# Patient Record
Sex: FEMALE | Race: WHITE | NOT HISPANIC OR LATINO | Employment: OTHER | URBAN - NONMETROPOLITAN AREA
[De-identification: names, ages, dates, MRNs, and addresses within clinical notes are randomized per-mention and may not be internally consistent; named-entity substitution may affect disease eponyms.]

---

## 2017-08-09 ENCOUNTER — OFFICE VISIT (OUTPATIENT)
Dept: FAMILY MEDICINE CLINIC | Facility: CLINIC | Age: 42
End: 2017-08-09

## 2017-08-09 VITALS — SYSTOLIC BLOOD PRESSURE: 120 MMHG | DIASTOLIC BLOOD PRESSURE: 80 MMHG

## 2017-08-09 DIAGNOSIS — F33.1 MODERATE EPISODE OF RECURRENT MAJOR DEPRESSIVE DISORDER (HCC): ICD-10-CM

## 2017-08-09 DIAGNOSIS — Q78.0 OSTEOGENESIS IMPERFECTA TYPE III: Primary | ICD-10-CM

## 2017-08-09 DIAGNOSIS — S72.91XA CLOSED FRACTURE OF RIGHT FEMUR, UNSPECIFIED FRACTURE MORPHOLOGY, UNSPECIFIED PORTION OF FEMUR, INITIAL ENCOUNTER (HCC): ICD-10-CM

## 2017-08-09 PROCEDURE — 99203 OFFICE O/P NEW LOW 30 MIN: CPT | Performed by: STUDENT IN AN ORGANIZED HEALTH CARE EDUCATION/TRAINING PROGRAM

## 2017-08-09 NOTE — PROGRESS NOTES
"Subjective:     Maria Guadalupe Gibson is a 42 y.o. female who presents to [facility].     Osteogenesis Imperfecta   She has a history of osteogenesis imperfecta type III. She fell in June and had an x-ray done with showed a non-union of upper right femur and was recommended emergency surgery. Dr. Pham looked at x-ray and said it was too far for his capability. She needs another orthopedic surgeon. The pain is 5/10. Movement makes it worse. At baseline, she does not put any weight on it.     In the past, she has broken her right femur in which she had a josé miguel placed. She outgrew the josé miguel in childhood and had the josé miguel replaced. The replaced josé miguel she broke in half, and replaced it with a 10mm josé miguel, but it never healed. There has been no bone growth.     Depression  Over the past year, she has had episodes of depression. She had times where she didn't leave her house. She lives with her parents, and have had an overwhelming year with her health. She is sleeping alright. She doesn't eat as much as normal. She is agitated more than normal. Her concentration is decreased. She has had thoughts of hurting herself. She doesn't have a plan. The last time she had one of those thoughts two days ago. She has tried Prozac 15 years ago.     Preventative:  Over the past 2 weeks, have you felt down, depressed, or hopeless?Yes   Over the past 2 weeks, have you felt little interest or pleasure in doing things?Yes  Clinical depression screening refused by patient.No     On osteoporosis therapy?No     PHQ 9: 17 and states \"I feel lost, scared and alone\".     Past Medical Hx:  Past Medical History:   Diagnosis Date   • Depression    • Osteogenesis imperfecta type III        Past Surgical Hx:  Past Surgical History:   Procedure Laterality Date   • ELBOW PROCEDURE Right    • FEMUR SURGERY Bilateral    • HERNIA REPAIR     • SPINE SURGERY Bilateral        Health Maintenance:  Health Maintenance   Topic Date Due   • TDAP/TD VACCINES (1 - Tdap) " 01/09/1995   • MEDICARE ANNUAL WELLNESS  08/09/2017   • PAP SMEAR  08/09/2017   • INFLUENZA VACCINE  09/01/2017       Current Meds:  No current outpatient prescriptions on file.    Allergies:  Codeine and Latex    Family Hx:  Family History   Problem Relation Age of Onset   • Lymphoma Brother         Social History:  Social History     Social History   • Marital status: Single     Spouse name: N/A   • Number of children: N/A   • Years of education: N/A     Occupational History   • Not on file.     Social History Main Topics   • Smoking status: Current Every Day Smoker     Packs/day: 0.25   • Smokeless tobacco: Never Used   • Alcohol use No      Comment: sober for 2.5 years    • Drug use: No   • Sexual activity: Defer     Other Topics Concern   • Not on file     Social History Narrative   • No narrative on file       Review of Systems  Review of Systems   Constitutional: Positive for diaphoresis. Negative for chills and fever.   HENT: Negative for sneezing and sore throat.    Eyes: Negative for pain and discharge.   Respiratory: Negative for cough and shortness of breath.    Gastrointestinal: Negative for constipation, diarrhea, nausea and vomiting.   Endocrine: Negative for cold intolerance and heat intolerance.   Genitourinary: Negative for difficulty urinating, dysuria, frequency and urgency.   Musculoskeletal: Positive for arthralgias. Negative for myalgias.   Skin: Negative for rash and wound.   Allergic/Immunologic: Negative for environmental allergies and food allergies.   Neurological: Negative for dizziness, syncope and weakness.   Psychiatric/Behavioral: Positive for agitation, decreased concentration and sleep disturbance. Negative for confusion.       Objective:     /80   Physical Exam   Constitutional: She is oriented to person, place, and time. She appears well-developed and well-nourished. No distress.   HENT:   Head: Normocephalic and atraumatic.   Nose: Nose normal.   Eyes: Conjunctivae and EOM  are normal. Pupils are equal, round, and reactive to light.   Neck: Normal range of motion. Neck supple.   Cardiovascular: Normal rate, regular rhythm and normal heart sounds.    No murmur heard.  Pulmonary/Chest: Effort normal and breath sounds normal. No respiratory distress. She has no wheezes. She has no rales.   Abdominal: Soft. Bowel sounds are normal. She exhibits no distension. There is no tenderness. There is no guarding.   Musculoskeletal:   Patient has osteogenesis imperfecta type 3, so ROM is limited secondary to disease. Patient does not walk at baseline.    Neurological: She is alert and oriented to person, place, and time.   Skin: Skin is warm and dry.   Psychiatric: She has a normal mood and affect. Her behavior is normal.   Vitals reviewed.    Assessment/Plan:     1. Osteogenesis imperfecta type III    2. Closed fracture of right femur, unspecified fracture morphology, unspecified portion of femur, initial encounter    3. Moderate episode of recurrent major depressive disorder         Problems Addressed this Visit        Musculoskeletal and Integument    Osteogenesis imperfecta type III - Primary    Relevant Orders    Ambulatory Referral to Orthopedic Surgery       Other    Moderate episode of recurrent major depressive disorder    Relevant Orders    -Ambulatory Referral to Psychology.   -Patient states that she wants to look up antidepressant medications on her own before she makes a decision. She is going to go home, and make an appointment in a week to start a new medication after she does her research.       Other Visit Diagnoses     Closed fracture of right femur, unspecified fracture morphology, unspecified portion of femur, initial encounter        Relevant Orders    Ambulatory Referral to Orthopedic Surgery        Follow-up:     Return in about 1 week (around 8/16/2017) for Recheck .    GOALS:  To discuss with psychologist about depression and to make a decision regarding medication.      Preventative:  Preventative Medicine: Female Preventative: Last PAP was 8/1/2015.  Vaccines:   Tetanus vaccine: up to date  Annual influenza vaccine: up to date   Pneumococcal vaccine: up to date  Hep B vaccine: up to date   Zoster vaccine:up to date    Smoking: Smoking cessation counseling was provided.  Alcohol: does not drink  Weight: eat more fruits and vegetables    RISK SCORE: 4            This document has been electronically signed by Brinda Browne MD on August 9, 2017 4:42 PM

## 2017-08-09 NOTE — PROGRESS NOTES
Subjective   Maria Guadalupe Gibson is a 42 y.o. female.     History of Present Illness The patient comes in to establish with Dr. Browne.    The following portions of the patient's history were reviewed and updated as appropriate: allergies, current medications, past family history, past medical history, past social history, past surgical history and problem list.    Review of Systems   Constitutional: Negative for fatigue and fever.   Respiratory: Negative for cough, chest tightness and stridor.    Cardiovascular: Negative for chest pain, palpitations and leg swelling.   Musculoskeletal: Positive for arthralgias and myalgias.       Objective   Physical Exam   Constitutional: She appears well-developed and well-nourished.   HENT:   Head: Normocephalic and atraumatic.   Right Ear: External ear normal.   Left Ear: External ear normal.   Nose: Nose normal.   Mouth/Throat: Oropharynx is clear and moist.   Eyes: EOM are normal. Pupils are equal, round, and reactive to light.   Neck: Normal range of motion.   Cardiovascular: Normal rate, regular rhythm and normal heart sounds.  Exam reveals no gallop and no friction rub.    No murmur heard.  Pulmonary/Chest: Effort normal and breath sounds normal. No respiratory distress. She has no wheezes. She has no rales.   Abdominal: Soft. Bowel sounds are normal. She exhibits no distension. There is no tenderness.   Musculoskeletal:   Minimally tender right hip.   Vitals reviewed.      Assessment/Plan   Maria Guadalupe was seen today for establish care.    Diagnoses and all orders for this visit:    Osteogenesis imperfecta type III    Closed fracture of right femur, unspecified fracture morphology, unspecified portion of femur, initial encounter    Other orders  -     Ambulatory Referral to Orthopedic Surgery        I have reviewed the notes, assessments, and/or procedures performed by the resident  , I concur with her/his documentation of Maria Guadalupe Gibson.

## 2017-08-22 ENCOUNTER — OFFICE VISIT (OUTPATIENT)
Dept: FAMILY MEDICINE CLINIC | Facility: CLINIC | Age: 42
End: 2017-08-22

## 2017-08-22 VITALS — SYSTOLIC BLOOD PRESSURE: 118 MMHG | HEART RATE: 89 BPM | DIASTOLIC BLOOD PRESSURE: 78 MMHG | OXYGEN SATURATION: 99 %

## 2017-08-22 DIAGNOSIS — F33.1 MODERATE EPISODE OF RECURRENT MAJOR DEPRESSIVE DISORDER (HCC): Primary | ICD-10-CM

## 2017-08-22 PROCEDURE — 99213 OFFICE O/P EST LOW 20 MIN: CPT | Performed by: FAMILY MEDICINE

## 2017-08-22 RX ORDER — ESCITALOPRAM OXALATE 10 MG/1
10 TABLET ORAL DAILY
Qty: 30 TABLET | Refills: 0 | Status: SHIPPED | OUTPATIENT
Start: 2017-08-22 | End: 2017-09-26 | Stop reason: SDUPTHER

## 2017-08-22 NOTE — PROGRESS NOTES
Subjective:     Maria Guadalupe Gibson is a 42 y.o. female patient of Dr. Browne, who presents for follow up of depression.     Depression HPI:  She has had depression off and on several times over the past year.  Episodes have been associated with agoraphobia.  She has decreased appetite, agitation, insomnia, decreased concentration.  Denies suicidal ideation at this time but does report feeling that way in the past.  She lives with her parents, and have had an overwhelming year with her health.  Previous treatments tried: Prozac 15 years ago.   She recently saw her PCP to discuss starting depression medications but wanted to go home to research side effects.  Today she is asking if starting Lexapro could be an option for her.    Preventative:  Over the past 2 weeks, have you felt down, depressed, or hopeless?Yes   Over the past 2 weeks, have you felt little interest or pleasure in doing things?Yes  Clinical depression screening refused by patient.No     On osteoporosis therapy?No     Past Medical Hx:  Past Medical History:   Diagnosis Date   • Agoraphobia with panic attacks    • Benign paroxysmal positional vertigo    • Chronic pain    • Depression    • Depressive disorder    • External hemorrhoids without complication     excised   • Internal hemorrhoids     with prolapse      • Osteogenesis imperfecta type III     with dwarfism, non union R femur,Uses a wheelchair          Past Surgical Hx:  Past Surgical History:   Procedure Laterality Date   • COLONOSCOPY  11/15/2010    Colitis found in the entire colon. Biopsy taken. Normal terminal ileum. Biopsy taken   • ELBOW PROCEDURE Right    • FEMUR SURGERY Bilateral    • HEMORROIDECTOMY  09/17/2013    With excision of a single internal and external hemorrhoid.   • HERNIA REPAIR  09/21/2011    Large ventral hernia. Open ventral hernioplasty with mesh   • LUMBAR DISC SURGERY     • SPINE SURGERY Bilateral    • UPPER GASTROINTESTINAL ENDOSCOPY  11/15/2010    Normal  esophagus.Gastritis found in the stomach. Biopsy taken. Normal duodenum. Biopsy taken       Health Maintenance:  Health Maintenance   Topic Date Due   • PNEUMOCOCCAL VACCINE (19-64 MEDIUM RISK) (1 of 1 - PPSV23) 01/09/1995   • TDAP/TD VACCINES (1 - Tdap) 01/09/1995   • MEDICARE ANNUAL WELLNESS  08/09/2017   • PAP SMEAR  08/09/2017   • INFLUENZA VACCINE  09/01/2017       Current Meds:  No current outpatient prescriptions on file.    Allergies:  Latex and Codeine    Family Hx:  Family History   Problem Relation Age of Onset   • Lymphoma Brother         Social History:  Social History     Social History   • Marital status: Single     Spouse name: N/A   • Number of children: N/A   • Years of education: N/A     Occupational History   • Not on file.     Social History Main Topics   • Smoking status: Current Every Day Smoker     Packs/day: 0.25   • Smokeless tobacco: Never Used   • Alcohol use No      Comment: sober for 2.5 years    • Drug use: No   • Sexual activity: Defer     Other Topics Concern   • Not on file     Social History Narrative       Review of Systems  Review of Systems   Constitutional: Negative for chills and fever.   HENT: Negative for congestion, sneezing and sore throat.    Eyes: Negative for photophobia and visual disturbance.   Respiratory: Negative for cough and shortness of breath.    Cardiovascular: Negative for chest pain and palpitations.   Gastrointestinal: Negative for constipation, diarrhea, nausea and vomiting.   Endocrine: Negative for cold intolerance and heat intolerance.   Genitourinary: Negative for difficulty urinating, dysuria, frequency and urgency.   Musculoskeletal: Positive for arthralgias. Negative for myalgias.   Skin: Negative for rash and wound.   Allergic/Immunologic: Negative for environmental allergies and food allergies.   Neurological: Negative for dizziness, syncope and weakness.   Psychiatric/Behavioral: Positive for agitation, decreased concentration, dysphoric mood and  sleep disturbance. Negative for confusion and suicidal ideas.       Objective:     /78  Pulse 89  SpO2 99%   Physical Exam   Constitutional: She is oriented to person, place, and time. She appears well-developed and well-nourished. No distress.   HENT:   Head: Normocephalic and atraumatic.   Eyes: EOM are normal. Pupils are equal, round, and reactive to light.   Neck: Normal range of motion.   Cardiovascular: Normal rate, regular rhythm and normal heart sounds.    No murmur heard.  Pulmonary/Chest: Effort normal and breath sounds normal. No respiratory distress. She has no wheezes. She has no rales.   Abdominal: Soft. Bowel sounds are normal. She exhibits no distension. There is no tenderness. There is no guarding.   Musculoskeletal:   Patient has osteogenesis imperfecta type 3, so ROM is limited secondary to disease. Patient does not walk at baseline.    Neurological: She is alert and oriented to person, place, and time.   Skin: Skin is warm and dry.   Multiple tattoos   Psychiatric: She has a normal mood and affect. Her behavior is normal.   Vitals reviewed.    Assessment/Plan:     Maria Guadalupe was seen today for depression and follow-up.    Diagnoses and all orders for this visit:    Moderate episode of recurrent major depressive disorder  -     escitalopram (LEXAPRO) 10 MG tablet; Take 1 tablet by mouth Daily.      Follow-up:     Return in about 1 month (around 9/22/2017) for with DR LYNCH.    GOALS:  To discuss with psychologist about depression and to make a decision regarding medication.     Preventative:  Preventative Medicine: Female Preventative: Last PAP was 8/1/2015.  Vaccines:   Tetanus vaccine: up to date  Annual influenza vaccine: up to date   Pneumococcal vaccine: up to date  Hep B vaccine: up to date   Zoster vaccine:up to date    Smoking: Smoking cessation counseling was provided.  Alcohol: does not drink  Weight: eat more fruits and vegetables    RISK SCORE: 4      Signature  Kimmie  MD Maggie PGY3  [place] [address]  Office: [phone]    This document has been electronically signed by Kimmie Serrano MD on August 25, 2017 2:15 PM

## 2017-09-25 ENCOUNTER — OFFICE VISIT (OUTPATIENT)
Dept: BEHAVIORAL HEALTH | Facility: CLINIC | Age: 42
End: 2017-09-25

## 2017-09-25 ENCOUNTER — TELEPHONE (OUTPATIENT)
Dept: FAMILY MEDICINE CLINIC | Facility: CLINIC | Age: 42
End: 2017-09-25

## 2017-09-25 DIAGNOSIS — F33.1 MODERATE EPISODE OF RECURRENT MAJOR DEPRESSIVE DISORDER (HCC): Primary | ICD-10-CM

## 2017-09-25 DIAGNOSIS — F33.1 MODERATE EPISODE OF RECURRENT MAJOR DEPRESSIVE DISORDER (HCC): ICD-10-CM

## 2017-09-25 PROCEDURE — 90791 PSYCH DIAGNOSTIC EVALUATION: CPT | Performed by: PSYCHOLOGIST
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
## 2017-09-26 RX ORDER — ESCITALOPRAM OXALATE 10 MG/1
10 TABLET ORAL DAILY
Qty: 30 TABLET | Refills: 2 | Status: SHIPPED | OUTPATIENT
Start: 2017-09-26 | End: 2018-02-06

## 2017-09-26 NOTE — TELEPHONE ENCOUNTER
Refilled. Patient needs to be seen in November so we can assess her progress.        This document has been electronically signed by Brinda Browne MD on September 26, 2017 10:33 AM

## 2018-02-06 ENCOUNTER — OFFICE VISIT (OUTPATIENT)
Dept: FAMILY MEDICINE CLINIC | Facility: CLINIC | Age: 43
End: 2018-02-06

## 2018-02-06 VITALS
DIASTOLIC BLOOD PRESSURE: 70 MMHG | HEIGHT: 55 IN | HEART RATE: 103 BPM | OXYGEN SATURATION: 95 % | WEIGHT: 98 LBS | BODY MASS INDEX: 22.68 KG/M2 | SYSTOLIC BLOOD PRESSURE: 120 MMHG

## 2018-02-06 DIAGNOSIS — R63.5 WEIGHT GAIN: Primary | ICD-10-CM

## 2018-02-06 DIAGNOSIS — R79.89 OTHER SPECIFIED ABNORMAL FINDINGS OF BLOOD CHEMISTRY: ICD-10-CM

## 2018-02-06 DIAGNOSIS — Z13.1 SCREENING FOR DIABETES MELLITUS: ICD-10-CM

## 2018-02-06 DIAGNOSIS — E66.1 DRUG-INDUCED OBESITY, UNSPECIFIED CLASSIFICATION, UNSPECIFIED WHETHER SERIOUS COMORBIDITY PRESENT: ICD-10-CM

## 2018-02-06 PROCEDURE — 99406 BEHAV CHNG SMOKING 3-10 MIN: CPT | Performed by: STUDENT IN AN ORGANIZED HEALTH CARE EDUCATION/TRAINING PROGRAM

## 2018-02-06 PROCEDURE — 99213 OFFICE O/P EST LOW 20 MIN: CPT | Performed by: STUDENT IN AN ORGANIZED HEALTH CARE EDUCATION/TRAINING PROGRAM

## 2018-02-06 RX ORDER — BUPROPION HYDROCHLORIDE 150 MG/1
150 TABLET ORAL DAILY
Qty: 60 TABLET | Refills: 1 | Status: SHIPPED | OUTPATIENT
Start: 2018-02-06 | End: 2018-03-16 | Stop reason: SDUPTHER

## 2018-02-06 NOTE — PROGRESS NOTES
Subjective   Maria Guadalupe Gibson is a 43 y.o. female.     History of Present Illness   The patient comes in for evaluation by Dr. Browne.She has had weight gain associated with her antidepressant. She has stopped it.  The following portions of the patient's history were reviewed and updated as appropriate: allergies, current medications, past family history, past medical history, past social history, past surgical history and problem list.    Review of Systems   Constitutional: Negative for fatigue and fever.   Respiratory: Negative for cough, chest tightness and stridor.    Cardiovascular: Negative for chest pain, palpitations and leg swelling.       Objective   Physical Exam   Constitutional: She appears well-developed.   HENT:   Head: Normocephalic.   Right Ear: External ear normal.   Left Ear: External ear normal.   Nose: Nose normal.   Mouth/Throat: Oropharynx is clear and moist.   Eyes: Pupils are equal, round, and reactive to light.   Neck: Normal range of motion.   Cardiovascular: Normal rate, regular rhythm and normal heart sounds.  Exam reveals no friction rub.    No murmur heard.  Pulmonary/Chest: Effort normal and breath sounds normal. No respiratory distress. She has no wheezes. She has no rales.   Abdominal: Soft. Bowel sounds are normal. She exhibits no distension. There is no tenderness.   Skin:   Multiple tattoos on the arms and trunk.       Assessment/Plan   Maria Guadalupe was seen today for depression.    Diagnoses and all orders for this visit:    Weight gain  -     TSH; Future  -     T4, free; Future  -     CBC No Differential; Future  -     Comprehensive metabolic panel; Future    Screening for diabetes mellitus  -     Hemoglobin A1c; Future  -     Lipid panel; Future    Other specified abnormal findings of blood chemistry   -     Hemoglobin A1c; Future    Drug-induced obesity, unspecified classification, unspecified whether serious comorbidity present   -     Lipid panel; Future    Other orders  -      buPROPion XL (WELLBUTRIN XL) 150 MG 24 hr tablet; Take 1 tablet by mouth Daily. Take one tablet by mouth for 4 days then increase to 2 pills a day.      Agree with plans as per Dr. Browne.

## 2018-02-06 NOTE — PROGRESS NOTES
Subjective:     Maria Guadalupe Gibson is a 43 y.o. female who presents for follow up for depression.     Depression  She established care in August of 2017 and noted severe depression. She did not want to start an anti-depressant at that time because she wanted to look up the medications herself. She saw Dr. Serrano a few weeks later and started Lexapro 10mg. She has been taking it since 3 days ago and she abruptly stopped. She has gained 30 pounds. She says she stopped taking the medication due to weight gain. She states that it has worked for her otherwise. She has a history of depression with suicidal and homocidal ideation at times, but that has significantly improved. Most of her mental illness is associated with the fact that she lives with her parents in an unhealthy situation.   She saw Dr. Willis in September 2017.  She did not show up to her appointment in October with him. She wants to be seen at [facility].     Allergies  She complains of allergies and a rash on her arm that is from the napkins at work. She works at [employer].     Health Maintenance   Last Pap Smear was 7 years ago. She has never had any abnormal pap smears. She refuses to schedule a pap smear.   She wants to quit smoking.   She quit using marijuana.       Past Medical Hx:  Past Medical History:   Diagnosis Date   • Agoraphobia with panic attacks    • Benign paroxysmal positional vertigo    • Chronic pain    • Depression    • Depressive disorder    • External hemorrhoids without complication     excised   • Internal hemorrhoids     with prolapse      • Osteogenesis imperfecta type III     with dwarfism, non union R femur,Uses a wheelchair          Past Surgical Hx:  Past Surgical History:   Procedure Laterality Date   • COLONOSCOPY  11/15/2010    Colitis found in the entire colon. Biopsy taken. Normal terminal ileum. Biopsy taken   • ELBOW PROCEDURE Right    • FEMUR SURGERY Bilateral    • HEMORROIDECTOMY  09/17/2013    With  excision of a single internal and external hemorrhoid.   • HERNIA REPAIR  09/21/2011    Large ventral hernia. Open ventral hernioplasty with mesh   • LUMBAR DISC SURGERY     • SPINE SURGERY Bilateral    • UPPER GASTROINTESTINAL ENDOSCOPY  11/15/2010    Normal esophagus.Gastritis found in the stomach. Biopsy taken. Normal duodenum. Biopsy taken       Health Maintenance:  Health Maintenance   Topic Date Due   • PNEUMOCOCCAL VACCINE (19-64 MEDIUM RISK) (1 of 1 - PPSV23) 01/09/1995   • TDAP/TD VACCINES (1 - Tdap) 01/09/1995   • MEDICARE ANNUAL WELLNESS  08/09/2017   • PAP SMEAR  02/06/2021   • INFLUENZA VACCINE  Addressed       Current Meds:    Current Outpatient Prescriptions:   •  buPROPion XL (WELLBUTRIN XL) 150 MG 24 hr tablet, Take 1 tablet by mouth Daily. Take one tablet by mouth for 4 days then increase to 2 pills a day., Disp: 60 tablet, Rfl: 1  •  Cetirizine HCl (ZYRTEC ALLERGY) 10 MG capsule, Take 10 mg by mouth Daily., Disp: 30 capsule, Rfl: 5    Allergies:  Latex and Codeine    Family Hx:  Family History   Problem Relation Age of Onset   • Lymphoma Brother         Social History:  Social History     Social History   • Marital status: Single     Spouse name: N/A   • Number of children: N/A   • Years of education: N/A     Occupational History   • Not on file.     Social History Main Topics   • Smoking status: Current Every Day Smoker     Packs/day: 0.25   • Smokeless tobacco: Never Used   • Alcohol use No      Comment: sober for 2.5 years    • Drug use: No   • Sexual activity: Defer     Other Topics Concern   • Not on file     Social History Narrative       Review of Systems  Review of Systems   Constitutional: Positive for fatigue and unexpected weight change. Negative for chills, diaphoresis and fever.   HENT: Negative for sneezing and sore throat.    Eyes: Negative for pain and discharge.   Respiratory: Negative for cough and shortness of breath.    Gastrointestinal: Negative for constipation, diarrhea,  "nausea and vomiting.   Endocrine: Negative for cold intolerance and heat intolerance.   Genitourinary: Negative for difficulty urinating, dysuria, frequency and urgency.   Musculoskeletal: Negative for arthralgias and myalgias.   Skin: Negative for color change and pallor.   Allergic/Immunologic: Negative for environmental allergies and food allergies.   Neurological: Negative for dizziness, syncope and weakness.   Psychiatric/Behavioral: Positive for sleep disturbance and suicidal ideas. Negative for confusion. The patient is nervous/anxious.        Objective:     /70 (BP Location: Left arm, Patient Position: Sitting, Cuff Size: Adult)  Pulse 103  Ht 119.4 cm (47\")  Wt 44.5 kg (98 lb)  SpO2 95%  BMI 31.19 kg/m2   Physical Exam   Constitutional: She is oriented to person, place, and time. She appears well-developed and well-nourished. No distress.   Short stature, triangular face - features consistent with osteogenesis imperfecta type 3    HENT:   Head: Normocephalic and atraumatic.   Nose: Nose normal.   Eyes: Conjunctivae and EOM are normal. Pupils are equal, round, and reactive to light.   Neck: Normal range of motion. Neck supple.   Cardiovascular: Normal rate, regular rhythm and normal heart sounds.    No murmur heard.  Pulmonary/Chest: Effort normal and breath sounds normal. No respiratory distress. She has no wheezes. She has no rales.   Abdominal: Soft. Bowel sounds are normal. She exhibits no distension. There is no tenderness. There is no guarding.   Musculoskeletal: Normal range of motion.   Neurological: She is alert and oriented to person, place, and time.   Skin: Skin is warm and dry. Rash ( rash on left arm) noted.   Psychiatric: She has a normal mood and affect. Her behavior is normal.   Vitals reviewed.    Assessment/Plan:     1. Weight gain    2. Screening for diabetes mellitus    3. Other specified abnormal findings of blood chemistry     4. Drug-induced obesity, unspecified " classification, unspecified whether serious comorbidity present        1. Weight gain: she reports that she has gained 20-30 pounds that she believes is due to lexapro.  -patient self discontinued lexapro 3 days ago  -TSH to screen for thyroid disease  -CBC, CMP, Lipid panel, hemoglobin A1C   -discussed the importance of healthier eating and exercising when she can.     2. Allergies  -will start zyrtec daily    3. Depression  -will start Wellbutrin one tablet for 4 days and then two tablets daily.  -PHQ-9 is 20.   -follow up in 4 weeks  -call [facility] for an appointment     4. Smoking Cessation  -discussed importance of quitting smoking  -Wellbutrin to help with cravings.     5. Health Maintenance  -refuses pap smear  -will discuss Hep B vaccine at next visit. She has a history of IV drug abuse.           Follow-up:     Return in about 4 weeks (around 3/6/2018) for Recheck.        Preventative:    Depression Screening:  Over the past 2 weeks, have you felt down, depressed, or hopeless?Yes   Over the past 2 weeks, have you felt little interest or pleasure in doing things? yes  Clinical depression screening refused by patient.No   On osteoporosis therapy?No     PHQ-9: 20     Health Maintanence: Female Preventative: PAP is due - patient considering     Vaccines:   Tetanus vaccine: up to date  Annual influenza vaccine: not up to date - refused   Pneumococcal vaccine: unknown  Hep B vaccine: not up to date - patient willing to get those vaccines    Zoster vaccine:unknown        Smoking: currently smokes 4 cigarettes a day. Provided 3 minutes of cessation. She took a smoking class here in town.   Alcohol: does not drink. She quit 3 years ago this month. She is a recovering alcoholic.   Weight: goals include eat more fruits and vegetables, decrease soda or juice intake and have 3 meals a day    RISK SCORE: 3            This document has been electronically signed by Brinda Browne MD on February 6, 2018  12:54 PM

## 2018-03-15 ENCOUNTER — TELEPHONE (OUTPATIENT)
Dept: FAMILY MEDICINE CLINIC | Facility: CLINIC | Age: 43
End: 2018-03-15

## 2018-03-16 RX ORDER — BUPROPION HYDROCHLORIDE 150 MG/1
150 TABLET ORAL DAILY
Qty: 60 TABLET | Refills: 1 | Status: SHIPPED | OUTPATIENT
Start: 2018-03-16 | End: 2019-01-09

## 2018-03-16 NOTE — TELEPHONE ENCOUNTER
Done.        This document has been electronically signed by Brinda Browne MD on March 16, 2018 1:19 PM

## 2018-03-20 ENCOUNTER — TELEPHONE (OUTPATIENT)
Dept: FAMILY MEDICINE CLINIC | Facility: CLINIC | Age: 43
End: 2018-03-20

## 2018-03-20 NOTE — TELEPHONE ENCOUNTER
Josefina with [facility] called, said that pt brought a script in to them. It did not have the pt  or any other info on it.    They are wanting to know if could send over script with pt name,  and include Insurance information, face sheet/demographics and office note to support that the pt needs wheelchair      Please fax this over to [phone]    Can call Josefina at [phone]

## 2018-06-26 ENCOUNTER — TELEPHONE (OUTPATIENT)
Dept: FAMILY MEDICINE CLINIC | Facility: CLINIC | Age: 43
End: 2018-06-26

## 2018-06-26 DIAGNOSIS — Q78.0 OSTEOGENESIS IMPERFECTA TYPE III: Primary | ICD-10-CM

## 2018-06-26 NOTE — TELEPHONE ENCOUNTER
PT CALLED AND THOUGHT SHE WAS SUPPOSED TO GET A REFERRAL TO PHYSICAL THERAPY.     I DIDN'T SEE ANYTHING IN [facility] AND THE PATIENT SWEARS SHE SAW YOU AND TALKED TO YOU ABOUT IT    THANK YOU

## 2018-06-27 ENCOUNTER — TELEPHONE (OUTPATIENT)
Dept: FAMILY MEDICINE CLINIC | Facility: CLINIC | Age: 43
End: 2018-06-27

## 2018-06-27 NOTE — TELEPHONE ENCOUNTER
PATIENT CALLED AGAIN TODAY CONCERNING THE REFERRAL FOR A WHEELCHAIR. SHE SAYS SHE NEEDS TO HAVE THE REFERRAL IN ORDER TO GET THE CHAIR THROUGH [facility]. THEY WILL CLOSE HER CASE AT THE END OF THE MONTH.    THANK YOU

## 2018-07-09 ENCOUNTER — HOSPITAL ENCOUNTER (OUTPATIENT)
Dept: PHYSICAL THERAPY | Facility: HOSPITAL | Age: 43
Setting detail: THERAPIES SERIES
Discharge: HOME | End: 2018-07-09
Attending: STUDENT IN AN ORGANIZED HEALTH CARE EDUCATION/TRAINING PROGRAM

## 2018-07-09 DIAGNOSIS — Q78.0 OSTEOGENESIS IMPERFECTA TYPE III: Primary | ICD-10-CM

## 2018-07-09 PROCEDURE — G8979 MOBILITY GOAL STATUS: HCPCS

## 2018-07-09 PROCEDURE — G8980 MOBILITY D/C STATUS: HCPCS

## 2018-07-09 PROCEDURE — 97161 PT EVAL LOW COMPLEX 20 MIN: CPT

## 2018-07-09 PROCEDURE — 97163 PT EVAL HIGH COMPLEX 45 MIN: CPT | Performed by: PHYSICAL THERAPIST

## 2018-07-09 PROCEDURE — G8978 MOBILITY CURRENT STATUS: HCPCS

## 2018-07-09 NOTE — THERAPY EVALUATION
Outpatient Physical Therapy Ortho Initial Evaluation  [facility]. Wheel Chair Evaluation.      Patient Name: Maria uGadalupe Gibson  : 1975  MRN: [id number]  Today's Date: 2018      Visit Date: 2018    Patient Active Problem List   Diagnosis   • Osteogenesis imperfecta type III   • Moderate episode of recurrent major depressive disorder (CMS/HCC)        Past Medical History:   Diagnosis Date   • Agoraphobia with panic attacks    • Benign paroxysmal positional vertigo    • Chronic pain    • Depression    • Depressive disorder    • External hemorrhoids without complication     excised   • Internal hemorrhoids     with prolapse      • Osteogenesis imperfecta type III     with dwarfism, non union R femur,Uses a wheelchair           Past Surgical History:   Procedure Laterality Date   • COLONOSCOPY  11/15/2010    Colitis found in the entire colon. Biopsy taken. Normal terminal ileum. Biopsy taken   • ELBOW PROCEDURE Right    • FEMUR SURGERY Bilateral    • HEMORROIDECTOMY  2013    With excision of a single internal and external hemorrhoid.   • HERNIA REPAIR  2011    Large ventral hernia. Open ventral hernioplasty with mesh   • LUMBAR DISC SURGERY     • SPINE SURGERY Bilateral    • UPPER GASTROINTESTINAL ENDOSCOPY  11/15/2010    Normal esophagus.Gastritis found in the stomach. Biopsy taken. Normal duodenum. Biopsy taken       Visit Dx:     ICD-10-CM ICD-9-CM   1. Osteogenesis imperfecta type III Q78.0 756.51         [facility]   Wheelchair/Mobility Evaluation    Patient Information:   Patient name:  Patient : 1975  Equipment supplier:  Date of eval: 18  Diagnosis: Osteogenesis imperfecta type 3  Occupation: Not employed at this time    Subjective:   Chief complaint/History of Present condition:Present today with Osteogeogenis imperfecta type 3 with history of 4 chairs in past. Reports current wheel chair is about 10 years old. Reports current cushion is wore  out that led to a recent pressure ulcer on the bottom. Reports being unable to walk at this time due to a fracture of the right femur that has been broke for 11 years. Reports current wheelchair back support is poor and led to back pain. Reports doing transfers independent. Reports getting help PRN for dressing and ADLs but tries to do things independent as able. Notes right arm weakness and muscle atrophy that started 2-3 years ago and since that time is unable to self maneuver up/down hills. Reports current wheel chair had to be adjusted to fit more comfortably. Reports having sores on back of thighs that led to having to self adjust seating system.       Date of onset: Congenital   Past Medical History:   Past Medical History:   Diagnosis Date   • Agoraphobia with panic attacks    • Benign paroxysmal positional vertigo    • Chronic pain    • Depression    • Depressive disorder    • External hemorrhoids without complication     excised   • Internal hemorrhoids     with prolapse      • Osteogenesis imperfecta type III     with dwarfism, non union R femur,Uses a wheelchair        Past Surgical History:   Past Surgical History:   Procedure Laterality Date   • COLONOSCOPY  11/15/2010    Colitis found in the entire colon. Biopsy taken. Normal terminal ileum. Biopsy taken   • ELBOW PROCEDURE Right    • FEMUR SURGERY Bilateral    • HEMORROIDECTOMY  09/17/2013    With excision of a single internal and external hemorrhoid.   • HERNIA REPAIR  09/21/2011    Large ventral hernia. Open ventral hernioplasty with mesh   • LUMBAR DISC SURGERY     • SPINE SURGERY Bilateral    • UPPER GASTROINTESTINAL ENDOSCOPY  11/15/2010    Normal esophagus.Gastritis found in the stomach. Biopsy taken. Normal duodenum. Biopsy taken     Allergies:   Allergies   Allergen Reactions   • Latex Anaphylaxis and Angioedema   • Codeine Hives     Special tests (Xray, MRI etc): No recent of this calender year    Social History/Home environment:    ///single: Single   Children: 2 children 7 years old and 20 years old   Lives with: Her parents and her youngest child  Type of home:House that is wheel chair assessable       [place] in home: [place]  Bathroom accesible(per patient report): All accesible  Bedroom accessible(per patient report): All accesible  Home equipment: (lift chair, wheelchair, BSC, etc): Denies  Driveway (gravel, blacktop): Gravel   Current mode of transportation: Car- Patient uses UE strength to manage W/C across body to passenger seat   Does patient drive: Yes   What type of vehicle will be used: Car  Will patient load/unload independently: Yes     Communication:  Verbal communication: WNL        Hearing: WNL       Current ambulation/mobility:  Does the patient Ambulate?: No   Is the patient able to push a manual wheelchair: Yes  Fall status/recent falls/frequency: No falls from chair.     Objective:   General apperance: Present in manual wheelchair with independent propel. In sports wheelchair that's seat is too large for patient.   Posture in sitting: Frequent trunk shifting in wheel chair  In standing:N/A  Transfers: Independent with UE, RLE is unable to move, LLE is able to help with transfers.     Physical exam:  ROM(UE/LE):   Right elbow: -  Left elbow:0-130 with pain (wears compression sleeve)  Right shoulder :   flexion:132 with pain   Abduction: 88 with pain   Left shoulder:    Flexion: 145   Abduction: 120  Bilateral Wrist AROM is grossly WFL   Bilateral knee and ankle AROM is WNL with pain in right with AROM. Hip not examined.   Strength(UE/LE): UE is WFL and LE is WFL on left. Right not tested due to non heeled right femur break.   Tone(UE/LE): No tone noted.   Balance(sitting, standing): Standing: N/A. Sitting WNL   Coordination(UE/LE): No coordination deficits noted   Neurovascular status: (sensation in UE, LE, bottom area): Sensation is all in tact.   Other (vitals, visual observation of skin): No  "abnormalities noted  Pain rating/location/frequency:    Pain: 6/10 right side of body  Transfers(bed to chair, etc if applicable)- Independent it slide transfers with family at home PRN   Special tests:(6 minute walk test, TUG, if applicable): N/A for patient    Current wheel chair and seating system:   Wheel chair : PillGuard sports manual chair  Seat width: 18.2\"  Seat depth: 14\" (not appropriate fit for patient)  Back height: 14\" (not appropriate fit for patient)  Overall width: 28.5  Overall length: 34\" plus foot plate  Overall height in w/c: 28\"  Seat height from floor: Rear: 16.5       Front: 18.5  Age of current w/c and or seating system: About 10 years  Are there problems with current system: Current cushion is frayed, foam is wore out and no longer assisting in weight distribution, current wheels are slick   Hours current system is used: All awakened hours    Has the patient tried any other types of wheelchairs or scooters, if so what types: All manual wheelchairs    Current cushion: Foam   How old: at least 10 year  Does patient have sores if so where: Currently healed. Had sore on bottom of left buttock  History of sores: Yes       Clients Current Measurements:  Across Hips: 17.5  Hip to knee Left 12\" Right 12\"  Knee to foot left 17\" Right 16\"  Bottom of buttocks to inferior angle of scapula 14\"  Bottom of buttocks to top of shoulder 18  Bottom of buttocks to top of head 26  Bottom of buttocks to forearm with elbow bent 8\"  Shoe size: Kids 1  Chest width 10\"  Chest depth 15.5  Shoulder width: 13  Does patient have spasms: Yes Severity: Mild- Doesn't loose control of body  Need for lateral supports: No  Positioning seat belt needed? No  Orthopedic defortmities: (scoliosis, trunk rotation R/L, kyphosis, pelvic rotation ant/post, amuputee): Scoliosis with two jose luis rods and wiring, Right femur broken that is nonhealing     Treatment: Initial evaluation with seating and mobility assessment including " measurements taken and discussion of POC for patient to follow up with medical supply to obtain new system.     Assessment:   Patient is able to communicate and interact appropriately during evaluation.   It is recommended the patient receive a manual light weight wheel chair with sports tires for ability to maneuver in gravel driveway at home and other dynamic surfaces. It is recommended patient receive a cushion that aides in pressure relief due to history of sores in past but patient is independent in ability to perform pressure relief. Patients seating system should not be standard and patients body frame is not standard and is of very small stature. Patients current seating system appears to be standard and is absolutely not appropriate for the patient and led to issues for the patient. Patient is very independent and hopes to maintain that independence so a light weight chair the patient is able to lift is recommended. Patient is progressing with her congential disease and is loosing muscle tone that is limiting long distance ability to self propel and patient is unable to maneuver hills without stress on UE which poses increased risk of fractures to the UE so it is highly recommended patient receive a removable battery pack that aides in propulsion of the wheelchair in these more strenuous situations. Removable arm rests are recommended to aide in self transfers. No LE or trunk supports needed.  Patient appears to have a scoliotic trunk posture with a right rib hump and is recommended to have appropriate seating to aide in support and decrease risk of pressure ulcers and improve alignment as patient is in a standard back support with daily pain in back with cushion sitting too high on scapula. Patient will follow up with [facility] to obtain seating system.     Functional Limitation/Rehabilitation Problem List:    1. Ability to obtaining a new wheelchair   2. Decreased mobility    3. Difficulty with ADLs  and IADLs   Rehab Potential: Good  G-Codes:   CK,  CK,  CK    Goals:  Patient Goals:   1. Obtain new seating system   2. Have independent mobility in community and home   ST. Patient to follow up with medical supply for obtaining new seating system    Plan:   Wheelchair assessment, measurements, and mobility assessment. Patient to follow up PRN for additional documentation and measurements as needed but will otherwise be discharged at this time and to follow up with MD and or medical supplier of patients choice for chair.   Frequency: PRN  Reassessment: PRN     Thank you for this referral,   Sadie Davis PT, DPT                                                                          Time Calculation:     Therapy Suggested Charges     Code   Minutes Charges    None             Start Time: 1449  Stop Time: 1603  Time Calculation (min): 74 min  Total Timed Code Minutes- PT: 0 minute(s)     Therapy Charges for Today     Code Description Service Date Service Provider Modifiers Qty    55416026523 HC PT AQUA EVAL HIGH  COMPLEXITY 4 2018 Sadie Davis, PT GP 1    [id number] HC PT THER SUPP EA 15 MIN 2018 Sadie Davis PT GP 1                    Sadie Davis, PT  2018

## 2018-08-30 ENCOUNTER — DOCUMENTATION (OUTPATIENT)
Dept: PHYSICAL THERAPY | Facility: HOSPITAL | Age: 43
End: 2018-08-30

## 2019-02-13 ENCOUNTER — HOSPITAL ENCOUNTER (EMERGENCY)
Facility: HOSPITAL | Age: 44
Discharge: HOME | End: 2019-02-13
Attending: FAMILY MEDICINE | Admitting: FAMILY MEDICINE

## 2019-02-13 VITALS
WEIGHT: 120 LBS | HEART RATE: 74 BPM | DIASTOLIC BLOOD PRESSURE: 114 MMHG | SYSTOLIC BLOOD PRESSURE: 163 MMHG | TEMPERATURE: 97.4 F | OXYGEN SATURATION: 100 % | RESPIRATION RATE: 20 BRPM | BODY MASS INDEX: 38.19 KG/M2

## 2019-02-13 DIAGNOSIS — S61.012A LACERATION OF LEFT THUMB WITHOUT FOREIGN BODY WITHOUT DAMAGE TO NAIL, INITIAL ENCOUNTER: Primary | ICD-10-CM

## 2019-02-13 PROCEDURE — 99283 EMERGENCY DEPT VISIT LOW MDM: CPT

## 2019-02-13 RX ORDER — KETOROLAC TROMETHAMINE 10 MG/1
20 TABLET, FILM COATED ORAL ONCE
Status: COMPLETED | OUTPATIENT
Start: 2019-02-13 | End: 2019-02-13

## 2019-02-13 RX ADMIN — KETOROLAC TROMETHAMINE 20 MG: 10 TABLET, FILM COATED ORAL at 18:45

## 2019-02-14 NOTE — ED PROVIDER NOTES
Subjective   Patient was trying to open super glue and sliced her finger on the packaging.  Bleeding lasted about 20 minutes.          Laceration   Location:  Hand  Hand laceration location:  L hand  Length:  2 cm   Depth:  Cutaneous  Quality: straight    Time since incident:  4 hours  Injury mechanism: plastic.  Pain details:     Quality:  Burning and throbbing    Severity:  Severe    Timing:  Constant    Progression:  Unchanged  Foreign body present:  No foreign bodies  Relieved by:  None tried  Worsened by:  Nothing  Ineffective treatments:  None tried  Tetanus status:  Up to date  Associated symptoms: no fever, no focal weakness, no numbness, no rash, no redness, no swelling and no streaking        Review of Systems   Constitutional: Negative for activity change, appetite change, fatigue and fever.   HENT: Negative for ear pain and sore throat.    Eyes: Negative for pain and visual disturbance.   Respiratory: Negative for cough and shortness of breath.    Cardiovascular: Negative for chest pain and palpitations.   Gastrointestinal: Negative for abdominal pain and nausea.   Endocrine: Negative for cold intolerance and heat intolerance.   Genitourinary: Negative for difficulty urinating and dysuria.   Musculoskeletal: Positive for gait problem. Negative for arthralgias, joint swelling and myalgias.   Skin: Negative for color change and rash.   Neurological: Negative for dizziness, focal weakness, weakness and headaches.   Hematological: Negative for adenopathy. Does not bruise/bleed easily.   Psychiatric/Behavioral: Negative for agitation, confusion and sleep disturbance.       Past Medical History:   Diagnosis Date   • Agoraphobia with panic attacks    • Benign paroxysmal positional vertigo    • Chronic pain    • Depression    • Depressive disorder    • External hemorrhoids without complication     excised   • Internal hemorrhoids     with prolapse      • Osteogenesis imperfecta type III     with dwarfism, non  union R femur,Uses a wheelchair          Allergies   Allergen Reactions   • Latex Anaphylaxis and Angioedema   • Codeine Hives       Past Surgical History:   Procedure Laterality Date   • COLONOSCOPY  11/15/2010    Colitis found in the entire colon. Biopsy taken. Normal terminal ileum. Biopsy taken   • ELBOW PROCEDURE Right    • FEMUR SURGERY Bilateral    • HEMORROIDECTOMY  09/17/2013    With excision of a single internal and external hemorrhoid.   • HERNIA REPAIR  09/21/2011    Large ventral hernia. Open ventral hernioplasty with mesh   • LUMBAR DISC SURGERY     • SPINE SURGERY Bilateral    • UPPER GASTROINTESTINAL ENDOSCOPY  11/15/2010    Normal esophagus.Gastritis found in the stomach. Biopsy taken. Normal duodenum. Biopsy taken       Family History   Problem Relation Age of Onset   • Lymphoma Brother        Social History     Socioeconomic History   • Marital status: Single     Spouse name: Not on file   • Number of children: Not on file   • Years of education: Not on file   • Highest education level: Not on file   Tobacco Use   • Smoking status: Current Every Day Smoker     Packs/day: 0.25   • Smokeless tobacco: Never Used   Substance and Sexual Activity   • Alcohol use: No     Comment: sober for 2.5 years    • Drug use: No   • Sexual activity: Defer           Objective   Physical Exam   Constitutional: She is oriented to person, place, and time. She appears well-developed and well-nourished. No distress.   HENT:   Head: Normocephalic and atraumatic.   Nose: Nose normal.   Eyes: Conjunctivae are normal. Right eye exhibits no discharge. Left eye exhibits no discharge.   Neck: Neck supple.   Cardiovascular: Normal rate, regular rhythm and normal heart sounds. Exam reveals no gallop and no friction rub.   No murmur heard.  Pulmonary/Chest: Effort normal and breath sounds normal. No accessory muscle usage. No respiratory distress. She has no wheezes. She has no rales. She exhibits no tenderness.   Abdominal: Soft.  Bowel sounds are normal. She exhibits no distension. There is no tenderness.   Musculoskeletal: She exhibits no edema or deformity.   Neurological: She is alert and oriented to person, place, and time.   Bilateral hand sensation and mobility intact    Skin: Skin is warm and dry. Capillary refill takes less than 2 seconds. No rash noted. She is not diaphoretic. No erythema. No pallor.   Psychiatric: She has a normal mood and affect. Her behavior is normal.       Laceration Repair  Date/Time: 2/13/2019 7:01 PM  Performed by: Nedra Pruett MD  Authorized by: Nedra Pruett MD     Consent:     Consent obtained:  Verbal    Consent given by:  Patient    Risks discussed:  Infection, pain and poor cosmetic result    Alternatives discussed:  No treatment and delayed treatment  Anesthesia (see MAR for exact dosages):     Anesthesia method:  None  Laceration details:     Location:  Finger    Finger location:  L thumb    Length (cm):  2  Exploration:     Wound extent: vascular damage      Wound extent: no areolar tissue violation noted, no fascia violation noted, no foreign bodies/material noted, no muscle damage noted, no nerve damage noted, no tendon damage noted and no underlying fracture noted      Contaminated: no    Treatment:     Area cleansed with:  Hibiclens    Amount of cleaning:  Standard    Irrigation solution:  Sterile saline    Irrigation method:  Pressure wash    Visualized foreign bodies/material removed: no    Skin repair:     Repair method:  Tissue adhesive and Steri-Strips    Number of Steri-Strips:  5  Approximation:     Approximation:  Close    Vermilion border: well-aligned    Post-procedure details:     Dressing:  Open (no dressing)    Patient tolerance of procedure:  Tolerated well, no immediate complications               ED Course                  MDM      Final diagnoses:   Laceration of left thumb without foreign body without damage to nail, initial encounter           This  document has been electronically signed by Nedra Pruett MD on February 13, 2019 7:07 PM           Nedra Pruett MD  Resident  02/13/19 2919

## 2019-03-14 ENCOUNTER — HOSPITAL ENCOUNTER (OUTPATIENT)
Facility: HOSPITAL | Age: 44
Setting detail: OBSERVATION
Discharge: HOME | End: 2019-03-15
Attending: FAMILY MEDICINE | Admitting: FAMILY MEDICINE

## 2019-03-14 DIAGNOSIS — L03.116 CELLULITIS OF LEFT LOWER EXTREMITY: Primary | ICD-10-CM

## 2019-03-14 PROCEDURE — 85379 FIBRIN DEGRADATION QUANT: CPT | Performed by: STUDENT IN AN ORGANIZED HEALTH CARE EDUCATION/TRAINING PROGRAM

## 2019-03-14 PROCEDURE — 99284 EMERGENCY DEPT VISIT MOD MDM: CPT

## 2019-03-14 PROCEDURE — 83605 ASSAY OF LACTIC ACID: CPT | Performed by: FAMILY MEDICINE

## 2019-03-14 PROCEDURE — 87040 BLOOD CULTURE FOR BACTERIA: CPT | Performed by: FAMILY MEDICINE

## 2019-03-14 PROCEDURE — 80053 COMPREHEN METABOLIC PANEL: CPT | Performed by: FAMILY MEDICINE

## 2019-03-14 PROCEDURE — 85025 COMPLETE CBC W/AUTO DIFF WBC: CPT | Performed by: FAMILY MEDICINE

## 2019-03-14 RX ORDER — SODIUM CHLORIDE 0.9 % (FLUSH) 0.9 %
10 SYRINGE (ML) INJECTION AS NEEDED
Status: DISCONTINUED | OUTPATIENT
Start: 2019-03-14 | End: 2019-03-15 | Stop reason: HOSPADM

## 2019-03-15 ENCOUNTER — APPOINTMENT (OUTPATIENT)
Dept: GENERAL RADIOLOGY | Facility: HOSPITAL | Age: 44
End: 2019-03-15

## 2019-03-15 VITALS
DIASTOLIC BLOOD PRESSURE: 94 MMHG | OXYGEN SATURATION: 100 % | BODY MASS INDEX: 28.93 KG/M2 | RESPIRATION RATE: 18 BRPM | TEMPERATURE: 97.9 F | HEART RATE: 90 BPM | HEIGHT: 55 IN | WEIGHT: 125 LBS | SYSTOLIC BLOOD PRESSURE: 146 MMHG

## 2019-03-15 PROBLEM — L03.116 CELLULITIS OF LEFT LOWER EXTREMITY: Status: ACTIVE | Noted: 2019-03-15

## 2019-03-15 LAB
ALBUMIN SERPL-MCNC: 4.1 G/DL (ref 3.4–4.8)
ALBUMIN/GLOB SERPL: 1.3 G/DL (ref 1.1–1.8)
ALP SERPL-CCNC: 72 U/L (ref 38–126)
ALT SERPL W P-5'-P-CCNC: 13 U/L (ref 9–52)
ANION GAP SERPL CALCULATED.3IONS-SCNC: 11 MMOL/L (ref 5–15)
ANION GAP SERPL CALCULATED.3IONS-SCNC: 9 MMOL/L (ref 5–15)
AST SERPL-CCNC: 17 U/L (ref 14–36)
BASOPHILS # BLD AUTO: 0.05 10*3/MM3 (ref 0–0.2)
BASOPHILS # BLD AUTO: 0.05 10*3/MM3 (ref 0–0.2)
BASOPHILS NFR BLD AUTO: 0.5 % (ref 0–1.5)
BASOPHILS NFR BLD AUTO: 0.7 % (ref 0–1.5)
BILIRUB SERPL-MCNC: 0.4 MG/DL (ref 0.2–1.3)
BUN BLD-MCNC: 10 MG/DL (ref 7–21)
BUN BLD-MCNC: 12 MG/DL (ref 7–21)
BUN/CREAT SERPL: 28.6 (ref 7–25)
BUN/CREAT SERPL: 36.4 (ref 7–25)
CALCIUM SPEC-SCNC: 8.1 MG/DL (ref 8.4–10.2)
CALCIUM SPEC-SCNC: 8.4 MG/DL (ref 8.4–10.2)
CHLORIDE SERPL-SCNC: 102 MMOL/L (ref 95–110)
CHLORIDE SERPL-SCNC: 107 MMOL/L (ref 95–110)
CO2 SERPL-SCNC: 19 MMOL/L (ref 22–31)
CO2 SERPL-SCNC: 19 MMOL/L (ref 22–31)
CREAT BLD-MCNC: 0.33 MG/DL (ref 0.5–1)
CREAT BLD-MCNC: 0.35 MG/DL (ref 0.5–1)
D-DIMER, QUANTITATIVE (MAD,POW, STR): 440 NG/ML (FEU) (ref 0–470)
D-LACTATE SERPL-SCNC: 0.8 MMOL/L (ref 0.5–2)
D-LACTATE SERPL-SCNC: 2.1 MMOL/L (ref 0.5–2)
DEPRECATED RDW RBC AUTO: 47 FL (ref 37–54)
DEPRECATED RDW RBC AUTO: 47.8 FL (ref 37–54)
EOSINOPHIL # BLD AUTO: 0.17 10*3/MM3 (ref 0–0.4)
EOSINOPHIL # BLD AUTO: 0.19 10*3/MM3 (ref 0–0.4)
EOSINOPHIL NFR BLD AUTO: 1.7 % (ref 0.3–6.2)
EOSINOPHIL NFR BLD AUTO: 2.8 % (ref 0.3–6.2)
ERYTHROCYTE [DISTWIDTH] IN BLOOD BY AUTOMATED COUNT: 14.4 % (ref 12.3–15.4)
ERYTHROCYTE [DISTWIDTH] IN BLOOD BY AUTOMATED COUNT: 14.5 % (ref 12.3–15.4)
GFR SERPL CREATININE-BSD FRML MDRD: 203 ML/MIN/1.73 (ref 58–135)
GFR SERPL CREATININE-BSD FRML MDRD: 218 ML/MIN/1.73 (ref 58–135)
GLOBULIN UR ELPH-MCNC: 3.2 GM/DL (ref 2.3–3.5)
GLUCOSE BLD-MCNC: 105 MG/DL (ref 60–100)
GLUCOSE BLD-MCNC: 91 MG/DL (ref 60–100)
HCT VFR BLD AUTO: 33.7 % (ref 34–46.6)
HCT VFR BLD AUTO: 35.6 % (ref 34–46.6)
HGB BLD-MCNC: 11.1 G/DL (ref 12–15.9)
HGB BLD-MCNC: 11.8 G/DL (ref 12–15.9)
HOLD SPECIMEN: NORMAL
IMM GRANULOCYTES # BLD AUTO: 0.03 10*3/MM3 (ref 0–0.05)
IMM GRANULOCYTES # BLD AUTO: 0.04 10*3/MM3 (ref 0–0.05)
IMM GRANULOCYTES NFR BLD AUTO: 0.4 % (ref 0–0.5)
IMM GRANULOCYTES NFR BLD AUTO: 0.4 % (ref 0–0.5)
LYMPHOCYTES # BLD AUTO: 1.42 10*3/MM3 (ref 0.7–3.1)
LYMPHOCYTES # BLD AUTO: 1.65 10*3/MM3 (ref 0.7–3.1)
LYMPHOCYTES NFR BLD AUTO: 16.5 % (ref 19.6–45.3)
LYMPHOCYTES NFR BLD AUTO: 20.6 % (ref 19.6–45.3)
MCH RBC QN AUTO: 29.7 PG (ref 26.6–33)
MCH RBC QN AUTO: 29.8 PG (ref 26.6–33)
MCHC RBC AUTO-ENTMCNC: 32.9 G/DL (ref 31.5–35.7)
MCHC RBC AUTO-ENTMCNC: 33.1 G/DL (ref 31.5–35.7)
MCV RBC AUTO: 89.7 FL (ref 79–97)
MCV RBC AUTO: 90.3 FL (ref 79–97)
MONOCYTES # BLD AUTO: 0.7 10*3/MM3 (ref 0.1–0.9)
MONOCYTES # BLD AUTO: 0.81 10*3/MM3 (ref 0.1–0.9)
MONOCYTES NFR BLD AUTO: 10.2 % (ref 5–12)
MONOCYTES NFR BLD AUTO: 8.1 % (ref 5–12)
NEUTROPHILS # BLD AUTO: 4.5 10*3/MM3 (ref 1.4–7)
NEUTROPHILS # BLD AUTO: 7.25 10*3/MM3 (ref 1.4–7)
NEUTROPHILS NFR BLD AUTO: 65.3 % (ref 42.7–76)
NEUTROPHILS NFR BLD AUTO: 72.8 % (ref 42.7–76)
NRBC BLD AUTO-RTO: 0 /100 WBC (ref 0–0)
NRBC BLD AUTO-RTO: 0 /100 WBC (ref 0–0)
PLATELET # BLD AUTO: 372 10*3/MM3 (ref 140–450)
PLATELET # BLD AUTO: 435 10*3/MM3 (ref 140–450)
PMV BLD AUTO: 9.5 FL (ref 6–12)
PMV BLD AUTO: 9.5 FL (ref 6–12)
POTASSIUM BLD-SCNC: 3.6 MMOL/L (ref 3.5–5.1)
POTASSIUM BLD-SCNC: 3.7 MMOL/L (ref 3.5–5.1)
PROT SERPL-MCNC: 7.3 G/DL (ref 6.3–8.6)
RBC # BLD AUTO: 3.73 10*6/MM3 (ref 3.77–5.28)
RBC # BLD AUTO: 3.97 10*6/MM3 (ref 3.77–5.28)
SODIUM BLD-SCNC: 132 MMOL/L (ref 137–145)
SODIUM BLD-SCNC: 135 MMOL/L (ref 137–145)
WBC NRBC COR # BLD: 6.89 10*3/MM3 (ref 3.4–10.8)
WBC NRBC COR # BLD: 9.97 10*3/MM3 (ref 3.4–10.8)
WHOLE BLOOD HOLD SPECIMEN: NORMAL
WHOLE BLOOD HOLD SPECIMEN: NORMAL

## 2019-03-15 PROCEDURE — 96375 TX/PRO/DX INJ NEW DRUG ADDON: CPT

## 2019-03-15 PROCEDURE — 25010000002 MORPHINE PER 10 MG: Performed by: FAMILY MEDICINE

## 2019-03-15 PROCEDURE — 83605 ASSAY OF LACTIC ACID: CPT | Performed by: FAMILY MEDICINE

## 2019-03-15 PROCEDURE — 97162 PT EVAL MOD COMPLEX 30 MIN: CPT | Performed by: PHYSICAL THERAPIST

## 2019-03-15 PROCEDURE — 99217 PR OBSERVATION CARE DISCHARGE MANAGEMENT: CPT | Performed by: STUDENT IN AN ORGANIZED HEALTH CARE EDUCATION/TRAINING PROGRAM

## 2019-03-15 PROCEDURE — 96372 THER/PROPH/DIAG INJ SC/IM: CPT

## 2019-03-15 PROCEDURE — G0378 HOSPITAL OBSERVATION PER HR: HCPCS

## 2019-03-15 PROCEDURE — 87040 BLOOD CULTURE FOR BACTERIA: CPT | Performed by: FAMILY MEDICINE

## 2019-03-15 PROCEDURE — 73610 X-RAY EXAM OF ANKLE: CPT

## 2019-03-15 PROCEDURE — 25010000002 ENOXAPARIN PER 10 MG: Performed by: STUDENT IN AN ORGANIZED HEALTH CARE EDUCATION/TRAINING PROGRAM

## 2019-03-15 PROCEDURE — 25010000002 VANCOMYCIN 5 G RECONSTITUTED SOLUTION: Performed by: STUDENT IN AN ORGANIZED HEALTH CARE EDUCATION/TRAINING PROGRAM

## 2019-03-15 PROCEDURE — 85025 COMPLETE CBC W/AUTO DIFF WBC: CPT | Performed by: FAMILY MEDICINE

## 2019-03-15 PROCEDURE — 80048 BASIC METABOLIC PNL TOTAL CA: CPT | Performed by: FAMILY MEDICINE

## 2019-03-15 PROCEDURE — 96367 TX/PROPH/DG ADDL SEQ IV INF: CPT

## 2019-03-15 PROCEDURE — 96365 THER/PROPH/DIAG IV INF INIT: CPT

## 2019-03-15 PROCEDURE — 25010000002 KETOROLAC TROMETHAMINE PER 15 MG: Performed by: STUDENT IN AN ORGANIZED HEALTH CARE EDUCATION/TRAINING PROGRAM

## 2019-03-15 PROCEDURE — 25010000002 VANCOMYCIN 5 G RECONSTITUTED SOLUTION: Performed by: FAMILY MEDICINE

## 2019-03-15 PROCEDURE — 25010000002 PIPERACILLIN SOD-TAZOBACTAM PER 1 G: Performed by: FAMILY MEDICINE

## 2019-03-15 PROCEDURE — 73590 X-RAY EXAM OF LOWER LEG: CPT

## 2019-03-15 RX ORDER — ONDANSETRON 4 MG/1
4 TABLET, FILM COATED ORAL EVERY 6 HOURS PRN
Status: DISCONTINUED | OUTPATIENT
Start: 2019-03-15 | End: 2019-03-15 | Stop reason: HOSPADM

## 2019-03-15 RX ORDER — ACETAMINOPHEN 325 MG/1
650 TABLET ORAL EVERY 4 HOURS PRN
Status: DISCONTINUED | OUTPATIENT
Start: 2019-03-15 | End: 2019-03-15 | Stop reason: HOSPADM

## 2019-03-15 RX ORDER — SENNA AND DOCUSATE SODIUM 50; 8.6 MG/1; MG/1
2 TABLET, FILM COATED ORAL NIGHTLY
Status: DISCONTINUED | OUTPATIENT
Start: 2019-03-15 | End: 2019-03-15 | Stop reason: HOSPADM

## 2019-03-15 RX ORDER — KETOROLAC TROMETHAMINE 30 MG/ML
30 INJECTION, SOLUTION INTRAMUSCULAR; INTRAVENOUS ONCE
Status: COMPLETED | OUTPATIENT
Start: 2019-03-15 | End: 2019-03-15

## 2019-03-15 RX ORDER — KETOROLAC TROMETHAMINE 30 MG/ML
30 INJECTION, SOLUTION INTRAMUSCULAR; INTRAVENOUS ONCE
Status: DISCONTINUED | OUTPATIENT
Start: 2019-03-15 | End: 2019-03-15

## 2019-03-15 RX ORDER — CLINDAMYCIN HYDROCHLORIDE 150 MG/1
450 CAPSULE ORAL 4 TIMES DAILY
Qty: 84 CAPSULE | Refills: 0 | Status: SHIPPED | OUTPATIENT
Start: 2019-03-15 | End: 2019-03-22

## 2019-03-15 RX ORDER — L. ACIDOPHILUS/L.BULGARICUS 1MM CELL
1 TABLET ORAL 2 TIMES DAILY
Status: DISCONTINUED | OUTPATIENT
Start: 2019-03-15 | End: 2019-03-15 | Stop reason: HOSPADM

## 2019-03-15 RX ORDER — FAMOTIDINE 40 MG/1
40 TABLET, FILM COATED ORAL DAILY
Status: DISCONTINUED | OUTPATIENT
Start: 2019-03-15 | End: 2019-03-15 | Stop reason: HOSPADM

## 2019-03-15 RX ORDER — SODIUM CHLORIDE 0.9 % (FLUSH) 0.9 %
3-10 SYRINGE (ML) INJECTION AS NEEDED
Status: DISCONTINUED | OUTPATIENT
Start: 2019-03-15 | End: 2019-03-15 | Stop reason: HOSPADM

## 2019-03-15 RX ORDER — NICOTINE 21 MG/24HR
1 PATCH, TRANSDERMAL 24 HOURS TRANSDERMAL EVERY 24 HOURS
Status: DISCONTINUED | OUTPATIENT
Start: 2019-03-15 | End: 2019-03-15 | Stop reason: HOSPADM

## 2019-03-15 RX ORDER — SODIUM CHLORIDE 0.9 % (FLUSH) 0.9 %
3 SYRINGE (ML) INJECTION EVERY 12 HOURS SCHEDULED
Status: DISCONTINUED | OUTPATIENT
Start: 2019-03-15 | End: 2019-03-15 | Stop reason: HOSPADM

## 2019-03-15 RX ORDER — SODIUM CHLORIDE 9 MG/ML
75 INJECTION, SOLUTION INTRAVENOUS CONTINUOUS
Status: DISCONTINUED | OUTPATIENT
Start: 2019-03-15 | End: 2019-03-15 | Stop reason: HOSPADM

## 2019-03-15 RX ADMIN — SODIUM CHLORIDE, PRESERVATIVE FREE 3 ML: 5 INJECTION INTRAVENOUS at 11:14

## 2019-03-15 RX ADMIN — KETOROLAC TROMETHAMINE 30 MG: 30 INJECTION, SOLUTION INTRAMUSCULAR; INTRAVENOUS at 06:04

## 2019-03-15 RX ADMIN — SODIUM CHLORIDE 1000 ML: 900 INJECTION, SOLUTION INTRAVENOUS at 00:27

## 2019-03-15 RX ADMIN — FAMOTIDINE 40 MG: 40 TABLET ORAL at 10:03

## 2019-03-15 RX ADMIN — PIPERACILLIN SODIUM,TAZOBACTAM SODIUM 3.38 G: 3; .375 INJECTION, POWDER, FOR SOLUTION INTRAVENOUS at 00:56

## 2019-03-15 RX ADMIN — ENOXAPARIN SODIUM 40 MG: 40 INJECTION SUBCUTANEOUS at 04:23

## 2019-03-15 RX ADMIN — MORPHINE SULFATE 4 MG: 4 INJECTION INTRAVENOUS at 00:54

## 2019-03-15 RX ADMIN — SODIUM CHLORIDE 75 ML/HR: 9 INJECTION, SOLUTION INTRAVENOUS at 04:23

## 2019-03-15 RX ADMIN — VANCOMYCIN HYDROCHLORIDE 1250 MG: 5 INJECTION, POWDER, LYOPHILIZED, FOR SOLUTION INTRAVENOUS at 02:33

## 2019-03-15 RX ADMIN — ACETAMINOPHEN 650 MG: 325 TABLET, FILM COATED ORAL at 04:23

## 2019-03-15 RX ADMIN — LACTOBACILLUS TAB 1 TABLET: TAB at 10:03

## 2019-03-15 RX ADMIN — VANCOMYCIN HYDROCHLORIDE 750 MG: 5 INJECTION, POWDER, LYOPHILIZED, FOR SOLUTION INTRAVENOUS at 11:13

## 2019-03-15 NOTE — PLAN OF CARE
Problem: Patient Care Overview  Goal: Plan of Care Review  Outcome: Outcome(s) achieved Date Met: 03/15/19   03/15/19 1018   Coping/Psychosocial   Plan of Care Reviewed With patient   Plan of Care Review   Progress no change   OTHER   Outcome Summary PT evaluation only. Pt discharged from skilled PT as pt demonstrated independence with all functional transfers, bed mobility tasks and Nisha w/ w/c propulsion x 400 ft w/ B UE only. Discharged to care of self. Discharge home with parents to assist as needed. Would benefit from home health PT consult.

## 2019-03-15 NOTE — H&P
HISTORY AND PHYSICAL  NAME: Maria Guadalupe Gibson  : 1975  MRN: [id number]    DATE OF ADMISSION: 03/15/19    DATE & TIME SEEN: 03/15/19 2:17 AM    PCP: Provider, No Known    CODE STATUS: Full code    CHIEF COMPLAINT swelling of left lower leg    HPI:  Maria Guadalupe Gibson is a 44 y.o. female with a CMH of osteogenesis imperfecta type III who presents complaining of 1 day history of left lower extremity swelling.  She denies any trauma or injury to the lower leg and states that her left ankle began to hurt which she thought was just her shoes being too tight.  She then began to notice that her left lower extremity was becoming erythematous and more edematous as the day went on.  She denies any history of blood clot in herself or in her family.  She has not recently been sedentary more than her usual however she is in a wheelchair for most of her mobility.  She denies any recent surgeries.  She denies shortness of breath, chest pain, nausea, vomiting, constipation, diarrhea, fevers, chills.  She reports a remote history of MRSA infection.    During my exam in the ED, she had a heart rate that stayed in the 90s during exam but otherwise did not appear to be in distress.  She received a dose of both vancomycin and Zosyn as well as a 1 L bolus of normal saline.  She is being admitted for further workup of left lower extremity edema as well as IV antibiotics for likely cellulitis.    CONCURRENT MEDICAL HISTORY:  Past Medical History:   Diagnosis Date   • Agoraphobia with panic attacks    • Benign paroxysmal positional vertigo    • Chronic pain    • Depression    • Depressive disorder    • External hemorrhoids without complication     excised   • Internal hemorrhoids     with prolapse      • Osteogenesis imperfecta type III     with dwarfism, non union R femur,Uses a wheelchair          PAST SURGICAL HISTORY:  Past Surgical History:   Procedure Laterality Date   • COLONOSCOPY  11/15/2010    Colitis found in the  entire colon. Biopsy taken. Normal terminal ileum. Biopsy taken   • ELBOW PROCEDURE Right    • FEMUR SURGERY Bilateral    • HEMORROIDECTOMY  09/17/2013    With excision of a single internal and external hemorrhoid.   • HERNIA REPAIR  09/21/2011    Large ventral hernia. Open ventral hernioplasty with mesh   • LUMBAR DISC SURGERY     • SPINE SURGERY Bilateral    • UPPER GASTROINTESTINAL ENDOSCOPY  11/15/2010    Normal esophagus.Gastritis found in the stomach. Biopsy taken. Normal duodenum. Biopsy taken       FAMILY HISTORY:  Family History   Problem Relation Age of Onset   • Lymphoma Brother         SOCIAL HISTORY:  Social History     Socioeconomic History   • Marital status: Single     Spouse name: Not on file   • Number of children: Not on file   • Years of education: Not on file   • Highest education level: Not on file   Social Needs   • Financial resource strain: Not on file   • Food insecurity - worry: Not on file   • Food insecurity - inability: Not on file   • Transportation needs - medical: Not on file   • Transportation needs - non-medical: Not on file   Occupational History   • Not on file   Tobacco Use   • Smoking status: Current Every Day Smoker     Packs/day: 0.25   • Smokeless tobacco: Never Used   Substance and Sexual Activity   • Alcohol use: No     Comment: sober for 2.5 years    • Drug use: Yes     Frequency: 2.0 times per week     Types: Marijuana   • Sexual activity: Defer   Other Topics Concern   • Not on file   Social History Narrative   • Not on file       HOME MEDICATIONS:  Prior to Admission medications    Medication Sig Start Date End Date Taking? Authorizing Provider   cephalexin (KEFLEX) 500 MG capsule Take one capsule by mouth 3 times a day until gone. 2/28/19   Barbara Molina APRN   promethazine (PHENERGAN) 25 MG tablet Take 1 tablet by mouth Every 6 (Six) Hours As Needed for Nausea or Vomiting. 2/28/19   Barbara Molina APRN   saccharomyces boulardii (FLORASTOR) 250 MG capsule  Take 1 capsule by mouth 2 (Two) Times a Day. 2/28/19   Barbara Molina, BRYON       ALLERGIES:  Latex and Codeine    REVIEW OF SYSTEMS  Review of Systems   Constitutional: Negative for activity change, appetite change, chills, fatigue and fever.   HENT: Negative for hearing loss, sneezing, sore throat and trouble swallowing.    Respiratory: Negative for cough, chest tightness and shortness of breath.    Cardiovascular: Positive for leg swelling. Negative for chest pain and palpitations.   Gastrointestinal: Negative for abdominal pain, blood in stool, constipation, diarrhea, nausea and vomiting.   Musculoskeletal: Positive for myalgias. Negative for back pain.   Skin: Positive for color change. Negative for rash.   Allergic/Immunologic: Negative for environmental allergies and food allergies.   Neurological: Negative for dizziness, light-headedness, numbness and headaches.   Psychiatric/Behavioral: Negative for agitation, confusion and hallucinations.       PHYSICAL EXAM:  Temp:  [96.4 °F (35.8 °C)-97.4 °F (36.3 °C)] 96.4 °F (35.8 °C)  Heart Rate:  [] 91  Resp:  [18-24] 18  BP: (129-153)/() 129/91  Body mass index is 39.78 kg/m².  Physical Exam   Constitutional: She is oriented to person, place, and time. She appears well-developed and well-nourished. No distress.   HENT:   Head: Normocephalic and atraumatic.   Right Ear: External ear normal.   Left Ear: External ear normal.   Neck: Normal range of motion. Neck supple.   Cardiovascular: Normal rate, regular rhythm, normal heart sounds and intact distal pulses.   Pulmonary/Chest: Effort normal and breath sounds normal. No respiratory distress.   Abdominal: Soft. Bowel sounds are normal. She exhibits no distension. There is no tenderness.   Musculoskeletal: Normal range of motion. She exhibits edema and tenderness.   Left leg with nonpitting edema from midfoot up to the knee, larger appearing than right calf; tenderness to palpation all along left calf  including the popliteal area.  Patient is short of stature.   Neurological: She is alert and oriented to person, place, and time. She has normal reflexes.   Skin: Skin is warm and dry. She is not diaphoretic. There is erythema.   Erythema noted from left midfoot to left knee.  No erythema noted of right lower extremity.  No streaking of redness or obvious break of the skin of the left lower extremity.   Psychiatric: She has a normal mood and affect. Her behavior is normal.       DIAGNOSTIC DATA:   Lab Results (last 24 hours)     Procedure Component Value Units Date/Time    Lactic Acid, Reflex [[id number]]  (Normal) Collected:  03/15/19 0352    Specimen:  Blood Updated:  03/15/19 0427     Lactate 0.8 mmol/L     Lactic Acid, Reflex Timer (This will reflex a repeat order 3-3:15 hours after ordered.) [[id number]] Collected:  03/14/19 2351    Specimen:  Blood Updated:  03/15/19 0401     Extra Tube Hold for add-ons.     Comment: Auto resulted.       D-dimer, Quantitative [[id number]]  (Normal) Collected:  03/14/19 2351    Specimen:  Blood Updated:  03/15/19 0348     D-Dimer, Quantitative 440 ng/mL (FEU)     Narrative:       Dimer values <500 ng/ml FEU are FDA approved as aid in diagnosis of deep venous thrombosis and pulmonary embolism.  This test should not be used in an exclusion strategy with pretest probability alone.    A recent guideline regarding diagnosis for pulmonary thromboembolism recommends an adjusted exclusion criterion of age x 10 ng/ml FEU for patients >50 years of age ([employer] Intern Med 2015; 163: 701-711).    [place] Draw [[id number]] Collected:  03/14/19 2351    Specimen:  Blood Updated:  03/15/19 0104    Narrative:       The following orders were created for panel order [place] Draw.  Procedure                               Abnormality         Status                     ---------                               -----------         ------                     Light Blue Top[[id number]]                                    Final result               Green Top (Gel)[[id number]]                                  Final result               Lavender Top[[id number]]                                     Final result               Gold Top - SST[[id number]]                                   Final result                 Please view results for these tests on the individual orders.    Light Blue Top [[id number]] Collected:  03/14/19 2351    Specimen:  Blood Updated:  03/15/19 0104     Extra Tube hold for add-on     Comment: Auto resulted       Green Top (Gel) [[id number]] Collected:  03/14/19 2351    Specimen:  Blood Updated:  03/15/19 0104     Extra Tube Hold for add-ons.     Comment: Auto resulted.       Lavender Top [[id number]] Collected:  03/14/19 2351    Specimen:  Blood Updated:  03/15/19 0104     Extra Tube hold for add-on     Comment: Auto resulted       Gold Top - SST [[id number]] Collected:  03/14/19 2351    Specimen:  Blood Updated:  03/15/19 0104     Extra Tube Hold for add-ons.     Comment: Auto resulted.       Blood Culture - Blood, Arm, Left [[id number]] Collected:  03/15/19 0055    Specimen:  Blood from Arm, Left Updated:  03/15/19 0058    Lactic Acid, Plasma [[id number]]  (Abnormal) Collected:  03/14/19 2351    Specimen:  Blood Updated:  03/15/19 0055     Lactate 2.1 mmol/L     Comprehensive Metabolic Panel [[id number]]  (Abnormal) Collected:  03/14/19 2351    Specimen:  Blood Updated:  03/15/19 0026     Glucose 91 mg/dL      BUN 12 mg/dL      Creatinine 0.33 mg/dL      Sodium 132 mmol/L      Potassium 3.6 mmol/L      Chloride 102 mmol/L      CO2 19.0 mmol/L      Calcium 8.4 mg/dL      Total Protein 7.3 g/dL      Albumin 4.10 g/dL      ALT (SGPT) 13 U/L      AST (SGOT) 17 U/L      Alkaline Phosphatase 72 U/L      Total Bilirubin 0.4 mg/dL      eGFR Non African Amer 218 mL/min/1.73      Globulin 3.2 gm/dL      A/G Ratio 1.3 g/dL      BUN/Creatinine Ratio 36.4     Anion Gap 11.0 mmol/L     CBC & Differential [[id number]] Collected:   03/14/19 2351    Specimen:  Blood Updated:  03/15/19 0005    Narrative:       The following orders were created for panel order CBC & Differential.  Procedure                               Abnormality         Status                     ---------                               -----------         ------                     CBC Auto Differential[[id number]]        Abnormal            Final result                 Please view results for these tests on the individual orders.    CBC Auto Differential [[id number]]  (Abnormal) Collected:  03/14/19 2351    Specimen:  Blood Updated:  03/15/19 0005     WBC 9.97 10*3/mm3      RBC 3.97 10*6/mm3      Hemoglobin 11.8 g/dL      Hematocrit 35.6 %      MCV 89.7 fL      MCH 29.7 pg      MCHC 33.1 g/dL      RDW 14.4 %      RDW-SD 47.0 fl      MPV 9.5 fL      Platelets 435 10*3/mm3      Neutrophil % 72.8 %      Lymphocyte % 16.5 %      Monocyte % 8.1 %      Eosinophil % 1.7 %      Basophil % 0.5 %      Immature Grans % 0.4 %      Neutrophils, Absolute 7.25 10*3/mm3      Lymphocytes, Absolute 1.65 10*3/mm3      Monocytes, Absolute 0.81 10*3/mm3      Eosinophils, Absolute 0.17 10*3/mm3      Basophils, Absolute 0.05 10*3/mm3      Immature Grans, Absolute 0.04 10*3/mm3      nRBC 0.0 /100 WBC     Blood Culture - Blood, Arm, Right [[id number]] Collected:  03/14/19 2351    Specimen:  Blood from Arm, Right Updated:  03/15/19 0000           Imaging Results (last 24 hours)     ** No results found for the last 24 hours. **            I reviewed the patient's new clinical results.    ASSESSMENT AND PLAN: This is a 44 y.o. female with:    Active Hospital Problems    Diagnosis Date Noted   • **Cellulitis of left lower extremity [L03.116] 03/15/2019     -history of MRSA infection in the past  -lactate 2.1, WBC normal  -d-dimer negative  -continue IVFs  -s/p vanc and zosyn in ER  -will continue vanc with pharm to dose     • Osteogenesis imperfecta type III [Q78.0] 08/09/2017     -stable         DVT  prophylaxis: Lovenox     JAVIER #[id number], reviewed and consistent with patient reported medications.    Expected Length of Stay: Anticipate discharge to home in 1-2 days.    I discussed the patients findings and my recommendations with patient.     Dr. Willson is the attending on record at time of admission, He is aware of the patient's status and agrees with the above history and physical.        Juany Berry MD PGY2   [facility]  This document has been electronically signed by Juany Berry MD on March 15, 2019 5:06 AM

## 2019-03-15 NOTE — THERAPY DISCHARGE NOTE
Acute Care - Physical Therapy Initial Eval/Discharge  [facility]     Patient Name: Maria Guadalupe Gibson  : 1975  MRN: [id number]  Today's Date: 3/15/2019   Onset of Illness/Injury or Date of Surgery: 03/15/19  Date of Referral to PT: 03/15/19  Referring Physician: Dr. Nedra Pruett      Admit Date: 3/14/2019    Visit Dx:    ICD-10-CM ICD-9-CM   1. Cellulitis of left lower extremity L03.116 682.6     Patient Active Problem List   Diagnosis   • Osteogenesis imperfecta type III   • Moderate episode of recurrent major depressive disorder (CMS/HCC)   • Cellulitis of left lower extremity     Past Medical History:   Diagnosis Date   • Agoraphobia with panic attacks    • Benign paroxysmal positional vertigo    • Chronic pain    • Depression    • Depressive disorder    • External hemorrhoids without complication     excised   • Internal hemorrhoids     with prolapse      • Osteogenesis imperfecta type III     with dwarfism, non union R femur,Uses a wheelchair        Past Surgical History:   Procedure Laterality Date   • COLONOSCOPY  11/15/2010    Colitis found in the entire colon. Biopsy taken. Normal terminal ileum. Biopsy taken   • ELBOW PROCEDURE Right    • FEMUR SURGERY Bilateral    • HEMORROIDECTOMY  2013    With excision of a single internal and external hemorrhoid.   • HERNIA REPAIR  2011    Large ventral hernia. Open ventral hernioplasty with mesh   • LUMBAR DISC SURGERY     • SPINE SURGERY Bilateral    • UPPER GASTROINTESTINAL ENDOSCOPY  11/15/2010    Normal esophagus.Gastritis found in the stomach. Biopsy taken. Normal duodenum. Biopsy taken          PT ASSESSMENT (last 12 hours)      Physical Therapy Evaluation     Row Name 03/15/19 1018          PT Evaluation Time/Intention    Subjective Information  complains of;pain L ankle  -BS     Document Type  evaluation;discharge evaluation/summary  -BS     Mode of Treatment  individual therapy;physical therapy  -BS     Patient Effort  excellent   -BS     Comment  excellent transfer ability: stand pivot method bed <> w/c  -BS     Row Name 03/15/19 1018          General Information    Patient Profile Reviewed?  yes  -BS     Onset of Illness/Injury or Date of Surgery  03/15/19  -BS     Referring Physician  Dr. Nedra Pruett  -BS     Patient Observations  alert;cooperative;agree to therapy  -BS     Patient/Family Observations  pt laying in bed  -BS     Prior Level of Function  independent:;community mobility;w/c or scooter;ADL's;driving drives with leg extenders put on foot pedals  -BS     Equipment Currently Used at Home  wheelchair  -BS     Pertinent History of Current Functional Problem  45 yo female admitted on 3/14/19 with 1 day h/o L LE edema, diagnosed with cellulitis.  -BS     Existing Precautions/Restrictions  other (see comments) osteogenesis imperfecta, bones fracture easily  -BS     Row Name 03/15/19 1018          Relationship/Environment    Lives With  parent(s);child(demetrice), dependent  -BS     Row Name 03/15/19 1018          Resource/Environmental Concerns    Current Living Arrangements  home/apartment/condo single level dwelling  -BS     Row Name 03/15/19 1018          Cognitive Assessment/Intervention- PT/OT    Orientation Status (Cognition)  oriented x 4  -BS     Row Name 03/15/19 1018          Bed Mobility Assessment/Treatment    Bed Mobility Assessment/Treatment  bed mobility (all) activities  -BS     [place] Level (Bed Mobility)  independent  -BS     Comment (Bed Mobility)  good core stability noted with supine <> sit   -BS     Row Name 03/15/19 1018          Transfer Assessment/Treatment    Transfer Assessment/Treatment  stand pivot/stand step transfer  -BS     Row Name 03/15/19 1018          Stand Pivot/Stand Step Transfer    Stand Pivot/Stand Step [place]  conditional independence  -BS     Assistive Device (Stand Pivot Stand Step Transfer)  wheelchair  -BS     Row Name 03/15/19 1018          Gait/Stairs Assessment/Training     Comment (Gait/Stairs)  w/c propulsion x 400+ ft Ryan w/ use of B UE's only on level surfaces  -BS     Row Name 03/15/19 1018          General ROM    GENERAL ROM COMMENTS  B LE's and B UE's grossly WFL, R hip flex ~90° AROM  -BS     Row Name 03/15/19 1018          MMT (Manual Muscle Testing)    General MMT Comments  B LE's grossly at least 3 to 3+/5 (not tested formally with resistance due to h/o osteogenesis imperfecta)  -BS     Row Name 03/15/19 1018          Vision Assessment/Intervention    Visual Impairment/Limitations  WFL  -BS     Row Name 03/15/19 1018          Pain Assessment    Additional Documentation  Pain Scale: Numbers Pre/Post-Treatment (Group)  -     Row Name 03/15/19 1018          Pain Scale: Numbers Pre/Post-Treatment    Pain Scale: Numbers, Pretreatment  4/10  -BS     Pain Scale: Numbers, Post-Treatment  4/10  -BS     Pain Location - Side  Left  -BS     Pain Location  ankle  -BS     Row Name 03/15/19 1018          Plan of Care Review    Plan of Care Reviewed With  patient  -BS     Row Name 03/15/19 1018          Physical Therapy Clinical Impression    Date of Referral to PT  03/15/19  -BS     PT Diagnosis (PT Clinical Impression)  cellulitis left lower extremity  -BS     Criteria for Skilled Interventions Met (PT Clinical Impression)  no  -BS     Row Name 03/15/19 1018          Vital Signs    Pre Systolic BP Rehab  134  -BS     Pre Treatment Diastolic BP  99  -BS     Post Systolic BP Rehab  149  -BS     Post Treatment Diastolic BP  99  -BS     Pretreatment Heart Rate (beats/min)  81  -BS     Posttreatment Heart Rate (beats/min)  86  -BS     Pre SpO2 (%)  100  -BS     O2 Delivery Pre Treatment  room air  -BS     Pre Patient Position  Supine  -BS     Intra Patient Position  Sitting  -BS     Post Patient Position  Sitting  -BS     Row Name 03/15/19 1018          Positioning and Restraints    Pre-Treatment Position  in bed  -BS     Post Treatment Position  wheelchair  -BS     In Wheelchair  call  light within reach;sitting;encouraged to call for assist  -BS     Row Name 03/15/19 1018          Living Environment    Home Accessibility  wheelchair accessible  -BS       User Key  (r) = Recorded By, (t) = Taken By, (c) = Cosigned By    Initials Name Provider Type    Salvatore Chester, PT Physical Therapist              PT Recommendation and Plan  Anticipated Discharge Disposition (PT): home, home with home health  Therapy Frequency (PT Clinical Impression): evaluation only  Outcome Summary/Treatment Plan (PT)  Anticipated Discharge Disposition (PT): home, home with home health  Plan of Care Reviewed With: patient  Progress: no change  Outcome Summary: PT evaluation only. Pt discharged from skilled PT as pt demonstrated independence with all functional transfers, bed mobility tasks and Nisha w/ w/c propulsion x  400 ft w/ B UE only. Discharged to care of self. Discharge home with parents to assist as needed. Would benefit from home health PT consult.          Time Calculation:   PT Charges     Row Name 03/15/19 1247             Time Calculation    Start Time  1018  -BS      Stop Time  1041  -BS      Time Calculation (min)  23 min  -BS      PT Received On  03/15/19  -BS      PT Goal Re-Cert Due Date  03/15/19  -BS        User Key  (r) = Recorded By, (t) = Taken By, (c) = Cosigned By    Initials Name Provider Type    Salvatore Chester PT Physical Therapist        Therapy Suggested Charges     Code   Minutes Charges    None           Therapy Charges for Today     Code Description Service Date Service Provider Modifiers Qty    79662019976 HC PT EVAL MOD COMPLEXITY 2 3/15/2019 Salvatore Bain PT GP 1               PT Discharge Summary  Anticipated Discharge Disposition (PT): home, home with home health  Reason for Discharge: Independent  Outcomes Achieved: Other(PT evaluation only, no goals set.)  Discharge Destination: Home    Salvatore Bain PT  3/15/2019

## 2019-03-15 NOTE — PROGRESS NOTES
"Bridge Note    Maria Guadalupe Gibson, a 44 y.o. female presented 5 hour(s) ago for a chief complaint of Cellulitis of left lower extremity [L03.116]. I have reviewed the patient's H&P and discussed the care with the admitting resident. Nursing notes have also been reviewed. There are no new developments or changes to the patient's acuity of condition, no acute events.     We will get a x-ray of her left leg and ankle this morning. The cellulitis has improved since admisssion. Tenderness has improved. If continued improvement, will consider transitioning to oral antibiotics this afternoon.     Vitals:    03/14/19 2307 03/15/19 0100 03/15/19 0246 03/15/19 0337   BP: 153/99 138/100  129/91   BP Location: Left arm Right arm  Right arm   Patient Position: Sitting Lying  Lying   Pulse: 120 95 90 91   Resp: 24 22  18   Temp: 97.4 °F (36.3 °C)   96.4 °F (35.8 °C)   TempSrc: Oral   Axillary   SpO2: 97% 98% 100% 96%   Weight: 56.7 kg (125 lb)      Height: 119.4 cm (47\")              This document has been electronically signed by Brinda Browne MD on March 15, 2019 8:24 AM        "

## 2019-03-15 NOTE — ED PROVIDER NOTES
Subjective   Left lower extremity edema and erythema x 2 days. SS in past.         Leg Pain   Location:  Leg  Time since incident:  2 days  Injury: no    Leg location:  L leg  Pain details:     Quality:  Aching    Radiates to:  Does not radiate    Severity:  Moderate    Onset quality:  Gradual    Duration:  2 days    Timing:  Constant  Chronicity:  Recurrent  Dislocation: no    Foreign body present:  No foreign bodies  Relieved by:  Nothing  Worsened by:  Nothing  Ineffective treatments:  None tried  Associated symptoms: swelling    Associated symptoms: no decreased ROM, no fatigue, no fever and no neck pain    Risk factors: no obesity        Review of Systems   Constitutional: Negative for appetite change, chills, diaphoresis, fatigue and fever.   HENT: Negative for congestion, ear discharge, ear pain, nosebleeds, rhinorrhea, sinus pressure, sore throat and trouble swallowing.    Eyes: Negative for discharge and redness.   Respiratory: Negative for apnea, cough, chest tightness, shortness of breath and wheezing.    Cardiovascular: Negative for chest pain.   Gastrointestinal: Negative for abdominal pain, diarrhea, nausea and vomiting.   Endocrine: Negative for polyuria.   Genitourinary: Negative for dysuria, frequency and urgency.   Musculoskeletal: Negative for myalgias and neck pain.   Skin: Negative for color change and rash.   Allergic/Immunologic: Negative for immunocompromised state.   Neurological: Negative for dizziness, seizures, syncope, weakness, light-headedness and headaches.   Hematological: Negative for adenopathy. Does not bruise/bleed easily.   Psychiatric/Behavioral: Negative for behavioral problems and confusion.   All other systems reviewed and are negative.      Past Medical History:   Diagnosis Date   • Agoraphobia with panic attacks    • Benign paroxysmal positional vertigo    • Chronic pain    • Depression    • Depressive disorder    • External hemorrhoids without complication     excised   •  Internal hemorrhoids     with prolapse      • Osteogenesis imperfecta type III     with dwarfism, non union R femur,Uses a wheelchair          Allergies   Allergen Reactions   • Latex Anaphylaxis and Angioedema   • Codeine Hives       Past Surgical History:   Procedure Laterality Date   • COLONOSCOPY  11/15/2010    Colitis found in the entire colon. Biopsy taken. Normal terminal ileum. Biopsy taken   • ELBOW PROCEDURE Right    • FEMUR SURGERY Bilateral    • HEMORROIDECTOMY  09/17/2013    With excision of a single internal and external hemorrhoid.   • HERNIA REPAIR  09/21/2011    Large ventral hernia. Open ventral hernioplasty with mesh   • LUMBAR DISC SURGERY     • SPINE SURGERY Bilateral    • UPPER GASTROINTESTINAL ENDOSCOPY  11/15/2010    Normal esophagus.Gastritis found in the stomach. Biopsy taken. Normal duodenum. Biopsy taken       Family History   Problem Relation Age of Onset   • Lymphoma Brother        Social History     Socioeconomic History   • Marital status: Single     Spouse name: Not on file   • Number of children: Not on file   • Years of education: Not on file   • Highest education level: Not on file   Tobacco Use   • Smoking status: Current Every Day Smoker     Packs/day: 0.25   • Smokeless tobacco: Never Used   Substance and Sexual Activity   • Alcohol use: No     Comment: sober for 2.5 years    • Drug use: No   • Sexual activity: Defer           Objective   Physical Exam   Constitutional: She is oriented to person, place, and time. She appears well-developed and well-nourished.   HENT:   Head: Normocephalic and atraumatic.   Nose: Nose normal.   Mouth/Throat: Oropharynx is clear and moist.   Eyes: Conjunctivae and EOM are normal. Pupils are equal, round, and reactive to light. Right eye exhibits no discharge. Left eye exhibits no discharge. No scleral icterus.   Neck: Normal range of motion. Neck supple. No tracheal deviation present.   Cardiovascular: Normal rate, regular rhythm and normal heart  sounds.   No murmur heard.  Pulmonary/Chest: Effort normal and breath sounds normal. No stridor. No respiratory distress. She has no wheezes. She has no rales.   Abdominal: Soft. Bowel sounds are normal. She exhibits no distension and no mass. There is no tenderness. There is no rebound and no guarding.   Musculoskeletal:        Left lower leg: She exhibits tenderness and swelling. She exhibits no laceration.        Legs:  Erythema, warmth, and tenderness left lower leg and foot.    Neurological: She is alert and oriented to person, place, and time. Coordination normal.   Skin: Skin is warm and dry. No rash noted. No erythema.   Psychiatric: She has a normal mood and affect. Her behavior is normal. Thought content normal.   Nursing note and vitals reviewed.      Procedures           ED Course          Labs Reviewed   COMPREHENSIVE METABOLIC PANEL - Abnormal; Notable for the following components:       Result Value    Creatinine 0.33 (*)     Sodium 132 (*)     CO2 19.0 (*)     eGFR Non  Amer 218 (*)     BUN/Creatinine Ratio 36.4 (*)     All other components within normal limits   LACTIC ACID, PLASMA - Abnormal; Notable for the following components:    Lactate 2.1 (*)     All other components within normal limits   CBC WITH AUTO DIFFERENTIAL - Abnormal; Notable for the following components:    Hemoglobin 11.8 (*)     Lymphocyte % 16.5 (*)     Neutrophils, Absolute 7.25 (*)     All other components within normal limits   BLOOD CULTURE   BLOOD CULTURE   RAINBOW DRAW    Narrative:     The following orders were created for panel order [place] Draw.  Procedure                               Abnormality         Status                     ---------                               -----------         ------                     Light Blue Top[[id number]]                                   Final result               Green Top (Gel)[[id number]]                                  Final result               Lavender Top[[id number]]                                      Final result               Gold Top - SST[[id number]]                                   Final result                 Please view results for these tests on the individual orders.   LACTIC ACID REFLEX TIMER   CBC AND DIFFERENTIAL    Narrative:     The following orders were created for panel order CBC & Differential.  Procedure                               Abnormality         Status                     ---------                               -----------         ------                     CBC Auto Differential[[id number]]        Abnormal            Final result                 Please view results for these tests on the individual orders.   LIGHT BLUE TOP   GREEN TOP   LAVENDER TOP   GOLD TOP - SST       No orders to display                 MDM      Final diagnoses:   Cellulitis of left lower extremity            Lawrence Perez MD  03/15/19 0159

## 2019-03-15 NOTE — PLAN OF CARE
Problem: Patient Care Overview  Goal: Plan of Care Review  Outcome: Ongoing (interventions implemented as appropriate)   03/15/19 0353   Coping/Psychosocial   Plan of Care Reviewed With patient   Plan of Care Review   Progress no change   OTHER   Outcome Summary New admission, VSS, patient reports pain, wheelchair in room       Problem: Skin and Soft Tissue Infection (Adult)  Goal: Signs and Symptoms of Listed Potential Problems Will be Absent, Minimized or Managed (Skin and Soft Tissue Infection)  Outcome: Ongoing (interventions implemented as appropriate)   03/15/19 0353   Goal/Outcome Evaluation   Problems Assessed (Skin and Soft Tissue Infection) all   Problems Present (Skin/Soft Tissue Inf) pain       Problem: Fall Risk (Adult)  Goal: Identify Related Risk Factors and Signs and Symptoms  Outcome: Outcome(s) achieved Date Met: 03/15/19   03/15/19 0353   Fall Risk (Adult)   Related Risk Factors (Fall Risk) culprit medication(s);environment unfamiliar;gait/mobility problems   Signs and Symptoms (Fall Risk) presence of risk factors     Goal: Absence of Fall  Outcome: Ongoing (interventions implemented as appropriate)   03/15/19 0353   Fall Risk (Adult)   Absence of Fall making progress toward outcome

## 2019-03-15 NOTE — DISCHARGE SUMMARY
"    DISCHARGE SUMMARY    NAME: Maria Guadalupe Ellison   PHYSICIAN: Brinda Browne MD  : 1975  MRN: [id number]    ADMITTED: 3/14/2019   DISCHARGED: 03/15/19    ADMISSION DIAGNOSES:  Active Hospital Problems    Diagnosis  POA   • **Cellulitis of left lower extremity [L03.116]  Yes   • Osteogenesis imperfecta type III [Q78.0]  Not Applicable      Resolved Hospital Problems   No resolved problems to display.     DISCHARGE DIAGNOSES:   Active Hospital Problems    Diagnosis  POA   • **Cellulitis of left lower extremity [L03.116]  Yes   • Osteogenesis imperfecta type III [Q78.0]  Not Applicable      Resolved Hospital Problems   No resolved problems to display.       SERVICE: Family Medicine. Attending Dr. Hennessy, Resident Brinda Browne MD    CONSULTS:   Consult Orders (all) (From admission, onward)    Start     Ordered    03/15/19 015  Family Practice - Resident (on-call MD unless specified)  Once     Specialty:  Family Medicine  Provider:  Juany Berry MD    03/15/19 0158          PROCEDURES:   Xr Ankle 3+ View Left    Result Date: 3/15/2019  [facility] [place] Patient Name: MARIA GUADALUPE ELLISON ATTENDING: BRINDA BROWNE REFERRING: BRINDA BROWNE ORDERING: BRINDA BROWNE ----------------------- PROCEDURE: Left ankle Date of exam: 3/15/2019 HISTORY: Cellulitis with pain, possible trauma Three views of the ankle were obtained. No fractures or acute abnormalities are seen. The distal tibia and fibula appear intact. The ankle mortise and other joint spaces are satisfactorily preserved. Generalized soft tissue swelling is seen around the ankle.     Generalized soft tissue swelling. No fractures or acute bony abnormalities are seen. Electronically signed by:  Christ Marshall MD  3/15/2019 9:40 AM CDT Workstation: [id number]          HISTORY OF PRESENT ILLNESS:   Per Dr. Mckeon's H&P  \"Maria Guadalupe Ellison is a 44 y.o. female with a CMH of osteogenesis imperfecta type III who presents complaining " "of 1 day history of left lower extremity swelling.  She denies any trauma or injury to the lower leg and states that her left ankle began to hurt which she thought was just her shoes being too tight.  She then began to notice that her left lower extremity was becoming erythematous and more edematous as the day went on.  She denies any history of blood clot in herself or in her family.  She has not recently been sedentary more than her usual however she is in a wheelchair for most of her mobility.  She denies any recent surgeries.  She denies shortness of breath, chest pain, nausea, vomiting, constipation, diarrhea, fevers, chills.  She reports a remote history of MRSA infection.     During my exam in the ED, she had a heart rate that stayed in the 90s during exam but otherwise did not appear to be in distress.  She received a dose of both vancomycin and Zosyn as well as a 1 L bolus of normal saline.  She is being admitted for further workup of left lower extremity edema as well as IV antibiotics for likely cellulitis.\"          DIAGNOSTIC DATA:   Lab Results (last 24 hours)     Procedure Component Value Units Date/Time    CBC & Differential [[id number]] Collected:  03/14/19 2351    Specimen:  Blood Updated:  03/15/19 0005    Narrative:       The following orders were created for panel order CBC & Differential.  Procedure                               Abnormality         Status                     ---------                               -----------         ------                     CBC Auto Differential[[id number]]        Abnormal            Final result                 Please view results for these tests on the individual orders.    Comprehensive Metabolic Panel [[id number]]  (Abnormal) Collected:  03/14/19 2351    Specimen:  Blood Updated:  03/15/19 0026     Glucose 91 mg/dL      BUN 12 mg/dL      Creatinine 0.33 mg/dL      Sodium 132 mmol/L      Potassium 3.6 mmol/L      Chloride 102 mmol/L      CO2 19.0 mmol/L      " Calcium 8.4 mg/dL      Total Protein 7.3 g/dL      Albumin 4.10 g/dL      ALT (SGPT) 13 U/L      AST (SGOT) 17 U/L      Alkaline Phosphatase 72 U/L      Total Bilirubin 0.4 mg/dL      eGFR Non African Amer 218 mL/min/1.73      Globulin 3.2 gm/dL      A/G Ratio 1.3 g/dL      BUN/Creatinine Ratio 36.4     Anion Gap 11.0 mmol/L     Lactic Acid, Plasma [[id number]]  (Abnormal) Collected:  03/14/19 2351    Specimen:  Blood Updated:  03/15/19 0055     Lactate 2.1 mmol/L     Blood Culture - Blood, Arm, Right [[id number]] Collected:  03/14/19 2351    Specimen:  Blood from Arm, Right Updated:  03/15/19 0000    CBC Auto Differential [[id number]]  (Abnormal) Collected:  03/14/19 2351    Specimen:  Blood Updated:  03/15/19 0005     WBC 9.97 10*3/mm3      RBC 3.97 10*6/mm3      Hemoglobin 11.8 g/dL      Hematocrit 35.6 %      MCV 89.7 fL      MCH 29.7 pg      MCHC 33.1 g/dL      RDW 14.4 %      RDW-SD 47.0 fl      MPV 9.5 fL      Platelets 435 10*3/mm3      Neutrophil % 72.8 %      Lymphocyte % 16.5 %      Monocyte % 8.1 %      Eosinophil % 1.7 %      Basophil % 0.5 %      Immature Grans % 0.4 %      Neutrophils, Absolute 7.25 10*3/mm3      Lymphocytes, Absolute 1.65 10*3/mm3      Monocytes, Absolute 0.81 10*3/mm3      Eosinophils, Absolute 0.17 10*3/mm3      Basophils, Absolute 0.05 10*3/mm3      Immature Grans, Absolute 0.04 10*3/mm3      nRBC 0.0 /100 WBC     Lactic Acid, Reflex Timer (This will reflex a repeat order 3-3:15 hours after ordered.) [[id number]] Collected:  03/14/19 2351    Specimen:  Blood Updated:  03/15/19 0401     Extra Tube Hold for add-ons.     Comment: Auto resulted.       D-dimer, Quantitative [[id number]]  (Normal) Collected:  03/14/19 2351    Specimen:  Blood Updated:  03/15/19 0348     D-Dimer, Quantitative 440 ng/mL (FEU)     Narrative:       Dimer values <500 ng/ml FEU are FDA approved as aid in diagnosis of deep venous thrombosis and pulmonary embolism.  This test should not be used in an exclusion  strategy with pretest probability alone.    A recent guideline regarding diagnosis for pulmonary thromboembolism recommends an adjusted exclusion criterion of age x 10 ng/ml FEU for patients >50 years of age ([employer] Intern Med 2015; 163: 701-711).    Blood Culture - Blood, Arm, Left [[id number]] Collected:  03/15/19 0055    Specimen:  Blood from Arm, Left Updated:  03/15/19 0058    Lactic Acid, Reflex [[id number]]  (Normal) Collected:  03/15/19 0352    Specimen:  Blood Updated:  03/15/19 0427     Lactate 0.8 mmol/L     Basic Metabolic Panel [[id number]]  (Abnormal) Collected:  03/15/19 1100    Specimen:  Blood Updated:  03/15/19 1124     Glucose 105 mg/dL      BUN 10 mg/dL      Creatinine 0.35 mg/dL      Sodium 135 mmol/L      Potassium 3.7 mmol/L      Chloride 107 mmol/L      CO2 19.0 mmol/L      Calcium 8.1 mg/dL      eGFR Non African Amer 203 mL/min/1.73      BUN/Creatinine Ratio 28.6     Anion Gap 9.0 mmol/L     CBC & Differential [[id number]] Collected:  03/15/19 1100    Specimen:  Blood Updated:  03/15/19 1111    Narrative:       The following orders were created for panel order CBC & Differential.  Procedure                               Abnormality         Status                     ---------                               -----------         ------                     CBC Auto Differential[[id number]]        Abnormal            Final result                 Please view results for these tests on the individual orders.    CBC Auto Differential [[id number]]  (Abnormal) Collected:  03/15/19 1100    Specimen:  Blood Updated:  03/15/19 1111     WBC 6.89 10*3/mm3      RBC 3.73 10*6/mm3      Hemoglobin 11.1 g/dL      Hematocrit 33.7 %      MCV 90.3 fL      MCH 29.8 pg      MCHC 32.9 g/dL      RDW 14.5 %      RDW-SD 47.8 fl      MPV 9.5 fL      Platelets 372 10*3/mm3      Neutrophil % 65.3 %      Lymphocyte % 20.6 %      Monocyte % 10.2 %      Eosinophil % 2.8 %      Basophil % 0.7 %      Immature Grans % 0.4 %       Neutrophils, Absolute 4.50 10*3/mm3      Lymphocytes, Absolute 1.42 10*3/mm3      Monocytes, Absolute 0.70 10*3/mm3      Eosinophils, Absolute 0.19 10*3/mm3      Basophils, Absolute 0.05 10*3/mm3      Immature Grans, Absolute 0.03 10*3/mm3      nRBC 0.0 /100 WBC         HOSPITAL COURSE:  She was admitted and started on IV vancomycin given her history of MRSA infection.  She was examined and the following morning, her tenderness had greatly improved and the erythema of her left lower extremity had improved.  The cellulitis was marked on admission, and there was improvement noted.  Given her history of osteogenesis imperfecta, an x-ray of her left ankle and left leg were obtained.  Her vitals have been stable.  She can go home on oral antibiotics.  We will do a 7-day course of clindamycin 450 mg 3 times a day.  She can follow-up with her PCP next week.  She will need to call for worsening symptoms.  She is stable for discharge on 3/15/2019.     DISCHARGE CONDITION:   Stable    DISPOSITION:  Home or Self Care    DISCHARGE MEDICATIONS     Discharge Medications      New Medications      Instructions Start Date   clindamycin 150 MG capsule  Commonly known as:  CLEOCIN   450 mg, Oral, 4 Times Daily             INSTRUCTIONS:  Activity:   Activity Instructions     Activity as Tolerated          Diet:   Diet Instructions     Advance Diet As Tolerated          Special instructions: Patient instructed to call MD or return to ED with worsening shortness of breath, chest pain, fever greater than 100.4 degrees F or any other medical concerns..    FOLLOW UP:   Additional Instructions for the Follow-ups that You Need to Schedule     Discharge Follow-up with PCP   As directed       Currently Documented PCP:    Brinda Browne MD    PCP Phone Number:    [phone]     Follow Up Details:  next week           Follow-up Information     Brinda Browne MD .    Specialty:  Family Medicine  Why:  next week  Contact  information:  [address] [place] [address]  [phone]                   PENDING TEST RESULTS AT DISCHARGE   Order Current Status    Blood Culture - Blood, Arm, Left In process    Blood Culture - Blood, Arm, Right In process        The x-ray tibia-fibula left has not yesterday resulted. We will follow-up with results outpatient.    Time: 32 min    Dr. Hennessy is the attending at time of discharge, she is aware of the patient's status and agrees with the above discharge summary.          This document has been electronically signed by Brinda Browne MD on March 15, 2019 11:28 AM

## 2019-03-15 NOTE — PROGRESS NOTES
"Pharmacokinetics by Pharmacy - Vancomycin Initial Consult    Maria Guadalupedomi Gibson is a 44 y.o. female being initiated on vancomycin for skin and soft tissue infection.       Objective:     [Ht: 119.4 cm (47\"); Wt: 56.7 kg (125 lb)]     Lab Results   Component Value Date    WBC 9.97 03/14/2019      Lab Results   Component Value Date    LACTATE 0.8 03/15/2019    LACTATE 2.1 (C) 03/14/2019      Temp Readings from Last 1 Encounters:   03/15/19 96.4 °F (35.8 °C) (Axillary)     Estimated Creatinine Clearance: 143.3 mL/min (A) (by C-G formula based on SCr of 0.33 mg/dL (L)).   Lab Results   Component Value Date    CREATININE 0.33 (L) 03/14/2019       Baseline culture results:  Microbiology Results (last 10 days)       ** No results found for the last 240 hours. **               Assessment  CBC and BMP have not been collected today  Afebrile    3/14 blood - in process  3/15 blood - in process    Patient reports history of MRSA, this is not documented in our records to review sensitivities    Received 1250 mg x1 3/15 at 0230    Per MD notes, cellulitis is already improving since admission yesterday - considering transition to oral antibiotics this afternoon    Utilizing traditional dosing for SSTI  Goal vancomycin trough: 10-15    Plan  1. Vancomycin 750 mg Q8h - start 3/15 at 1030  2. Trough 3/16 at 0200  3. Pharmacy will monitor renal function and adjust dose accordingly.    Maria Guadalupe Cota, [facility]  03/15/19 9:38 AM     "

## 2019-03-20 LAB
BACTERIA SPEC AEROBE CULT: NORMAL
BACTERIA SPEC AEROBE CULT: NORMAL

## 2019-03-22 ENCOUNTER — HOSPITAL ENCOUNTER (OUTPATIENT)
Dept: ULTRASOUND IMAGING | Facility: HOSPITAL | Age: 44
Discharge: HOME | End: 2019-03-22
Admitting: FAMILY MEDICINE

## 2019-03-22 ENCOUNTER — OFFICE VISIT (OUTPATIENT)
Dept: FAMILY MEDICINE CLINIC | Facility: CLINIC | Age: 44
End: 2019-03-22

## 2019-03-22 VITALS
BODY MASS INDEX: 28.93 KG/M2 | HEIGHT: 55 IN | WEIGHT: 125 LBS | HEART RATE: 67 BPM | DIASTOLIC BLOOD PRESSURE: 88 MMHG | OXYGEN SATURATION: 98 % | SYSTOLIC BLOOD PRESSURE: 142 MMHG

## 2019-03-22 DIAGNOSIS — M79.89 OTHER SPECIFIED SOFT TISSUE DISORDERS: ICD-10-CM

## 2019-03-22 DIAGNOSIS — R60.0 LOCALIZED EDEMA: Primary | ICD-10-CM

## 2019-03-22 DIAGNOSIS — O22.30 DVT (DEEP VEIN THROMBOSIS) IN PREGNANCY: ICD-10-CM

## 2019-03-22 PROCEDURE — 99213 OFFICE O/P EST LOW 20 MIN: CPT | Performed by: FAMILY MEDICINE

## 2019-03-22 PROCEDURE — 93971 EXTREMITY STUDY: CPT

## 2019-03-22 NOTE — PROGRESS NOTES
ID: Maria Guadalupe Gibson    CC:   Chief Complaint   Patient presents with   • Cellulitis     hospital follow up       Subjective:     HPI     Maria Guadalupe Gibson is a 44 y.o. female who presents for hospital follow up of cellulitis.  Patient was admitted to [facility] from 3/14/19 to 3/15/19.  She has finished a course of clindamycin.  Since discharge from the hospital the erythema of the left lower extremity has improved markedly but patient continues to complain of swelling of the left lower extremity with 2+ pitting edema up to knee.  She denies any other complaints.       Preventative:  Over the past 2 weeks, have you felt down, depressed, or hopeless?No   Over the past 2 weeks, have you felt little interest or pleasure in doing things?No  Clinical depression screening refused by patient.Not Indicated     On osteoporosis therapy?No     Past Medical Hx:  Past Medical History:   Diagnosis Date   • Agoraphobia with panic attacks    • Benign paroxysmal positional vertigo    • Chronic pain    • Depression    • Depressive disorder    • External hemorrhoids without complication     excised   • Internal hemorrhoids     with prolapse      • Osteogenesis imperfecta type III     with dwarfism, non union R femur,Uses a wheelchair          Past Surgical Hx:  Past Surgical History:   Procedure Laterality Date   • COLONOSCOPY  11/15/2010    Colitis found in the entire colon. Biopsy taken. Normal terminal ileum. Biopsy taken   • ELBOW PROCEDURE Right    • FEMUR SURGERY Bilateral    • HEMORROIDECTOMY  09/17/2013    With excision of a single internal and external hemorrhoid.   • HERNIA REPAIR  09/21/2011    Large ventral hernia. Open ventral hernioplasty with mesh   • LUMBAR DISC SURGERY     • SPINE SURGERY Bilateral    • UPPER GASTROINTESTINAL ENDOSCOPY  11/15/2010    Normal esophagus.Gastritis found in the stomach. Biopsy taken. Normal duodenum. Biopsy taken       Health Maintenance:  Health Maintenance   Topic  "Date Due   • PNEUMOCOCCAL VACCINE (19-64 MEDIUM RISK) (1 of 1 - PPSV23) 01/09/1995   • MEDICARE ANNUAL WELLNESS  08/09/2017   • INFLUENZA VACCINE  08/01/2019   • PAP SMEAR  02/06/2021   • TDAP/TD VACCINES (2 - Td) 06/06/2026       Current Meds:  No current outpatient medications on file.    Allergies:  Latex and Codeine    Family Hx:  Family History   Problem Relation Age of Onset   • Lymphoma Brother         Social History:  Social History     Socioeconomic History   • Marital status: Single     Spouse name: Not on file   • Number of children: Not on file   • Years of education: Not on file   • Highest education level: Not on file   Tobacco Use   • Smoking status: Current Every Day Smoker     Packs/day: 0.25   • Smokeless tobacco: Never Used   Substance and Sexual Activity   • Alcohol use: No     Comment: sober for 2.5 years    • Drug use: Yes     Frequency: 2.0 times per week     Types: Marijuana   • Sexual activity: Defer       Review of Systems   Constitutional: Negative for activity change, appetite change, fatigue and fever.   HENT: Negative for ear pain and sore throat.    Eyes: Negative for pain and visual disturbance.   Respiratory: Negative for cough and shortness of breath.    Cardiovascular: Positive for leg swelling. Negative for chest pain and palpitations.   Gastrointestinal: Negative for abdominal pain and nausea.   Endocrine: Negative for cold intolerance and heat intolerance.   Genitourinary: Negative for difficulty urinating and dysuria.   Musculoskeletal: Negative for arthralgias and gait problem.   Skin: Negative for color change and rash.   Neurological: Negative for dizziness, weakness and headaches.   Hematological: Negative for adenopathy. Does not bruise/bleed easily.   Psychiatric/Behavioral: Negative for agitation, confusion and sleep disturbance.           Objective:     /88   Pulse 67   Ht 119.4 cm (47\")   Wt 56.7 kg (125 lb) Comment: wheelchair, reported  LMP 02/20/2019 " (Within Days)   SpO2 98%   BMI 39.78 kg/m²     Physical Exam   Constitutional: She is oriented to person, place, and time. She appears well-developed and well-nourished. No distress.   HENT:   Head: Normocephalic and atraumatic.   Nose: Nose normal.   Eyes: Conjunctivae are normal. Right eye exhibits no discharge. Left eye exhibits no discharge.   Neck: Neck supple.   Cardiovascular: Normal rate, regular rhythm and normal heart sounds. Exam reveals no gallop and no friction rub.   No murmur heard.  Pulmonary/Chest: Effort normal and breath sounds normal. No accessory muscle usage. No respiratory distress. She has no wheezes. She has no rales. She exhibits no tenderness.   Abdominal: Soft. Bowel sounds are normal. She exhibits no distension. There is no tenderness.   Musculoskeletal: She exhibits no edema or deformity.   Neurological: She is alert and oriented to person, place, and time.   Skin: Skin is warm and dry. No rash noted. She is not diaphoretic. No erythema. No pallor.        Psychiatric: She has a normal mood and affect. Her behavior is normal.       Assessment/Plan:     Maria Guadalupe was seen today for cellulitis.    Diagnoses and all orders for this visit:    Acute deep vein thrombosis (DVT) of lower extremity, unspecified laterality, unspecified vein (CMS/HCC)  -     US venous doppler lower extremity left (duplex); Future  Will call patient with results.      Follow-up:     Return in about 4 weeks (around 4/19/2019) for Annual.      Goals        Patient Stated    • stay healthy (pt-stated)      Barriers to goals: none           Patient's Body mass index is 39.78 kg/m². BMI is above normal parameters. Recommendations include: exercise counseling and nutrition counseling.      Health Maintenance   Topic Date Due   • PNEUMOCOCCAL VACCINE (19-64 MEDIUM RISK) (1 of 1 - PPSV23) 01/09/1995   • MEDICARE ANNUAL WELLNESS  08/09/2017   • INFLUENZA VACCINE  08/01/2019   • PAP SMEAR  02/06/2021   • TDAP/TD VACCINES (2 -  Td) 06/06/2026       eat more fruits and vegetables, decrease soda or juice intake, increase physical activity, reduce portion size and cut out extra servings    RISK SCORE: 4          This document has been electronically signed by Nedra Pruett MD on April 30, 2019 3:06 PM

## 2019-03-22 NOTE — PROGRESS NOTES
I have seen the patient.  I have reviewed the notes, assessments, and/or procedures performed by Dr. Pruett, I concur with her/his documentation and assessment and plan for Maria Guadalupe Gibson.           This document has been electronically signed by Alesia Jane MD on March 22, 2019 2:00 PM

## 2019-05-12 PROCEDURE — 99283 EMERGENCY DEPT VISIT LOW MDM: CPT

## 2019-05-13 ENCOUNTER — HOSPITAL ENCOUNTER (EMERGENCY)
Facility: HOSPITAL | Age: 44
Discharge: HOME | End: 2019-05-13
Attending: EMERGENCY MEDICINE | Admitting: EMERGENCY MEDICINE

## 2019-05-13 VITALS
HEIGHT: 55 IN | RESPIRATION RATE: 18 BRPM | HEART RATE: 93 BPM | BODY MASS INDEX: 28.93 KG/M2 | WEIGHT: 125 LBS | DIASTOLIC BLOOD PRESSURE: 80 MMHG | TEMPERATURE: 97.9 F | SYSTOLIC BLOOD PRESSURE: 137 MMHG | OXYGEN SATURATION: 98 %

## 2019-05-13 DIAGNOSIS — B86 SCABIES: Primary | ICD-10-CM

## 2019-05-13 RX ORDER — PREDNISONE 20 MG/1
20 TABLET ORAL 2 TIMES DAILY
Qty: 10 TABLET | Refills: 0 | Status: SHIPPED | OUTPATIENT
Start: 2019-05-13 | End: 2019-05-18

## 2019-05-13 RX ORDER — PERMETHRIN 50 MG/G
CREAM TOPICAL ONCE
Qty: 60 G | Refills: 0 | Status: SHIPPED | OUTPATIENT
Start: 2019-05-13 | End: 2019-05-13

## 2019-05-13 NOTE — ED NOTES
Patient presents to ED with complaint of rash. It is present on hands feet legs bilateral, and back and gluteal, she states it is present in her groin area. She states she went to urgent care and received creams with no relief.      Mary Gore RN  05/13/19 0042

## 2019-05-13 NOTE — ED PROVIDER NOTES
Subjective   44-year-old white female presents to the emergency department chief complaint of rash.  She complains of an itchy rash that started on both her hands a week ago and now has spread.  She denies having a fever sore throat trouble swallowing or trouble breathing.  She relates she feels well otherwise.            Review of Systems   Constitutional: Negative for chills, diaphoresis and fever.   HENT: Negative for sore throat and trouble swallowing.    Respiratory: Negative for shortness of breath.    Cardiovascular: Negative for chest pain.   Gastrointestinal: Negative for abdominal pain, nausea and vomiting.   Skin: Positive for rash.   Neurological: Negative for syncope, weakness and headaches.   All other systems reviewed and are negative.      Past Medical History:   Diagnosis Date   • Agoraphobia with panic attacks    • Benign paroxysmal positional vertigo    • Chronic pain    • Depression    • Depressive disorder    • External hemorrhoids without complication     excised   • Internal hemorrhoids     with prolapse      • Osteogenesis imperfecta type III     with dwarfism, non union R femur,Uses a wheelchair          Allergies   Allergen Reactions   • Latex Anaphylaxis and Angioedema   • Codeine Hives       Past Surgical History:   Procedure Laterality Date   • COLONOSCOPY  11/15/2010    Colitis found in the entire colon. Biopsy taken. Normal terminal ileum. Biopsy taken   • ELBOW PROCEDURE Right    • FEMUR SURGERY Bilateral    • HEMORROIDECTOMY  09/17/2013    With excision of a single internal and external hemorrhoid.   • HERNIA REPAIR  09/21/2011    Large ventral hernia. Open ventral hernioplasty with mesh   • LUMBAR DISC SURGERY     • SPINE SURGERY Bilateral    • UPPER GASTROINTESTINAL ENDOSCOPY  11/15/2010    Normal esophagus.Gastritis found in the stomach. Biopsy taken. Normal duodenum. Biopsy taken       Family History   Problem Relation Age of Onset   • Lymphoma Brother        Social History      Socioeconomic History   • Marital status: Single     Spouse name: Not on file   • Number of children: Not on file   • Years of education: Not on file   • Highest education level: Not on file   Tobacco Use   • Smoking status: Current Every Day Smoker     Packs/day: 0.25   • Smokeless tobacco: Never Used   Substance and Sexual Activity   • Alcohol use: No     Comment: sober for 2.5 years    • Drug use: Yes     Frequency: 2.0 times per week     Types: Marijuana   • Sexual activity: Defer           Objective   Physical Exam   Constitutional: She is oriented to person, place, and time. No distress.   HENT:   Head: Normocephalic and atraumatic.   Right Ear: External ear normal.   Left Ear: External ear normal.   Nose: Nose normal.   Mouth/Throat: Oropharynx is clear and moist.   Eyes: Conjunctivae and EOM are normal. Pupils are equal, round, and reactive to light.   Neck: Normal range of motion. Neck supple.   Cardiovascular: Normal rate, regular rhythm, normal heart sounds and intact distal pulses.   Pulmonary/Chest: Effort normal and breath sounds normal.   Abdominal: Soft. She exhibits no distension. There is no tenderness.   Musculoskeletal: Normal range of motion. She exhibits no edema or tenderness.   Neurological: She is alert and oriented to person, place, and time. No cranial nerve deficit or sensory deficit. She exhibits normal muscle tone.   Skin: Skin is warm and dry. She is not diaphoretic.   Generalized rash consistent with scabies.   Psychiatric: She has a normal mood and affect. Her behavior is normal.   Nursing note and vitals reviewed.      Procedures           ED Course                  MDM      Final diagnoses:   Scabies            Alex Mendez MD  05/13/19 0049

## 2020-02-12 ENCOUNTER — OFFICE VISIT (OUTPATIENT)
Dept: FAMILY MEDICINE CLINIC | Facility: CLINIC | Age: 45
End: 2020-02-12

## 2020-02-12 VITALS
SYSTOLIC BLOOD PRESSURE: 130 MMHG | WEIGHT: 100.7 LBS | BODY MASS INDEX: 23.31 KG/M2 | OXYGEN SATURATION: 98 % | HEART RATE: 87 BPM | HEIGHT: 55 IN | TEMPERATURE: 97.1 F | DIASTOLIC BLOOD PRESSURE: 74 MMHG

## 2020-02-12 DIAGNOSIS — M25.531 BILATERAL WRIST PAIN: Primary | ICD-10-CM

## 2020-02-12 DIAGNOSIS — Z13.0 SCREENING, ANEMIA, DEFICIENCY, IRON: ICD-10-CM

## 2020-02-12 DIAGNOSIS — R53.83 FATIGUE, UNSPECIFIED TYPE: ICD-10-CM

## 2020-02-12 DIAGNOSIS — Q78.0 OSTEOGENESIS IMPERFECTA TYPE III: ICD-10-CM

## 2020-02-12 DIAGNOSIS — M25.551 RIGHT HIP PAIN: ICD-10-CM

## 2020-02-12 DIAGNOSIS — M25.532 BILATERAL WRIST PAIN: Primary | ICD-10-CM

## 2020-02-12 DIAGNOSIS — Z13.220 SCREENING FOR HYPERLIPIDEMIA: ICD-10-CM

## 2020-02-12 DIAGNOSIS — Z13.1 SCREENING FOR DIABETES MELLITUS: ICD-10-CM

## 2020-02-12 PROCEDURE — 99213 OFFICE O/P EST LOW 20 MIN: CPT | Performed by: STUDENT IN AN ORGANIZED HEALTH CARE EDUCATION/TRAINING PROGRAM

## 2020-02-12 NOTE — PROGRESS NOTES
I have reviewed the notes, assessments, and/or procedures performed by Brinda Browne MD during office visit. I concur with her/his documentation and assessment and plan for Maria Guadalupe Gibson.      This document has been electronically signed by Ender Quinones MD on February 12, 2020 4:45 PM

## 2020-02-12 NOTE — PROGRESS NOTES
Family Medicine Residency   Brinda Browne MD    Maria Guadalupe Gibson is a 45 y.o. female who presents to family medicine residency clinic for the following:    Subjective:     She is here today to re-establish care    She has osteogenesis type III. She is wheelchair bound. She has associated hip pain and wrist pain. The hip pain has been getting worse over the past 2 months. 7/10 in severity. Last January it popped out of place and she went to an ER in [place]. She feels that the wrist pain is due to the wheelchair use. She has associated numbness and tingling in her pinky finger bilaterally.     Her depression and anxiety have improved since last visit. Wellbutrin made it worse, and she is no longer on medication. She has an emotional support animal that helps. She is requesting a letter today so that it can live with her. It helps support her emotions and also does other tasks for her. No SI/HI.       Past Medical Hx:   Past Medical History:   Diagnosis Date   • Agoraphobia with panic attacks    • Benign paroxysmal positional vertigo    • Chronic pain    • Depression    • Depressive disorder    • External hemorrhoids without complication     excised   • Internal hemorrhoids     with prolapse      • Osteogenesis imperfecta type III     with dwarfism, non union R femur,Uses a wheelchair          Past Surgical Hx:  Past Surgical History:   Procedure Laterality Date   • COLONOSCOPY  11/15/2010    Colitis found in the entire colon. Biopsy taken. Normal terminal ileum. Biopsy taken   • ELBOW PROCEDURE Right    • FEMUR SURGERY Bilateral    • HEMORROIDECTOMY  09/17/2013    With excision of a single internal and external hemorrhoid.   • HERNIA REPAIR  09/21/2011    Large ventral hernia. Open ventral hernioplasty with mesh   • LUMBAR DISC SURGERY     • SPINE SURGERY Bilateral    • UPPER GASTROINTESTINAL ENDOSCOPY  11/15/2010    Normal esophagus.Gastritis found in the stomach. Biopsy taken. Normal duodenum.  Biopsy taken       Current Meds:    Current Outpatient Medications:   •  diclofenac (VOLTAREN) 1 % gel gel, Apply 4 g topically to the appropriate area as directed 4 (Four) Times a Day As Needed (pain)., Disp: 100 g, Rfl: 2  •  Elastic Bandages & Supports (WRIST SPLINT/LEFT SMALL) misc, 1 each Daily., Disp: 1 each, Rfl: 0  •  Elastic Bandages & Supports (WRIST SPLINT/RIGHT SMALL) misc, 1 each Daily., Disp: 1 each, Rfl: 0    Allergies:  Latex and Codeine    Family Hx:  Family History   Problem Relation Age of Onset   • Lymphoma Brother         Social History:  Social History     Socioeconomic History   • Marital status: Single     Spouse name: Not on file   • Number of children: Not on file   • Years of education: Not on file   • Highest education level: Not on file   Tobacco Use   • Smoking status: Current Every Day Smoker     Packs/day: 0.25   • Smokeless tobacco: Never Used   Substance and Sexual Activity   • Alcohol use: No     Comment: sober for 2.5 years    • Drug use: Yes     Frequency: 2.0 times per week     Types: Marijuana   • Sexual activity: Defer       Review of Systems  Review of Systems   Constitutional: Negative for chills, diaphoresis and fever.   HENT: Negative for sneezing and sore throat.    Eyes: Negative for pain and discharge.   Respiratory: Negative for cough and shortness of breath.    Gastrointestinal: Negative for constipation, diarrhea, nausea and vomiting.   Endocrine: Negative for cold intolerance and heat intolerance.   Genitourinary: Negative for difficulty urinating, dysuria, frequency and urgency.   Musculoskeletal: Positive for arthralgias and myalgias.   Skin: Negative for color change and pallor.   Allergic/Immunologic: Negative for environmental allergies and food allergies.   Neurological: Negative for dizziness, syncope and weakness.   Psychiatric/Behavioral: Negative for confusion and sleep disturbance.       Physical Exam:  Vitals:    02/12/20 1547   BP: 130/74   Pulse: 87    Temp: 97.1 °F (36.2 °C)   SpO2: 98%       Body mass index is 32.05 kg/m².   Physical Exam   Constitutional: She is oriented to person, place, and time. She appears well-developed and well-nourished. No distress.   HENT:   Head: Normocephalic and atraumatic.   Nose: Nose normal.   Eyes: Conjunctivae and EOM are normal.   Neck: Normal range of motion. Neck supple.   Cardiovascular: Normal rate, regular rhythm and normal heart sounds.   No murmur heard.  Pulmonary/Chest: Effort normal and breath sounds normal. No respiratory distress. She has no wheezes. She has no rales.   Abdominal: Soft. Bowel sounds are normal. She exhibits no distension. There is no tenderness. There is no guarding.   Musculoskeletal: Normal range of motion. She exhibits tenderness and deformity.   In wheelchair  Deformities associated with osteogenesis imperfecta III   Neurological: She is alert and oriented to person, place, and time.   Skin: Skin is warm and dry.   Psychiatric: She has a normal mood and affect. Her behavior is normal.   Vitals reviewed.      Objective:     Data Reviewed:     LABS:   Lab Results   Component Value Date    GLUCOSE 105 (H) 03/15/2019    BUN 10 03/15/2019    CREATININE 0.35 (L) 03/15/2019    EGFRIFNONA 203 (H) 03/15/2019    BCR 28.6 (H) 03/15/2019    K 3.7 03/15/2019    CO2 19.0 (L) 03/15/2019    CALCIUM 8.1 (L) 03/15/2019    ALBUMIN 4.10 03/14/2019    AST 17 03/14/2019    ALT 13 03/14/2019     Lab Results   Component Value Date    WBC 6.89 03/15/2019    HGB 11.1 (L) 03/15/2019    HCT 33.7 (L) 03/15/2019    MCV 90.3 03/15/2019     03/15/2019     No results found for: CHOL, CHLPL, TRIG, HDL, LDL, LDLDIRECT  No results found for: TSH, Y3XRPVW, S9QOYVE, THYROIDAB  No results found for: HGBA1C  Lab Results   Component Value Date    CREATININE 0.35 (L) 03/15/2019     No results found for: IRON, TIBC, FERRITIN      Health Maintenance:   Weight  -Class: Obese Class I: 30-34.9kg/m2  -Patient's Body mass index is  32.05 kg/m². BMI is above normal parameters. Recommendations include: exercise counseling and nutrition counseling.      Alcohol use:  reports that she does not drink alcohol.    Nicotine status  reports that she has been smoking. She has been smoking about 0.25 packs per day. She has never used smokeless tobacco.   Maria Guadalupe Gibson  reports that she has been smoking. She has been smoking about 0.25 packs per day. She has never used smokeless tobacco.         Health Maintenance  Health Maintenance   Topic Date Due   • MEDICARE ANNUAL WELLNESS  08/09/2017   • PNEUMOCOCCAL VACCINE (19-64 MEDIUM RISK) (1 of 1 - PPSV23) 02/12/2021 (Originally 1/9/1995)   • PAP SMEAR  02/06/2021   • TDAP/TD VACCINES (2 - Td) 06/06/2026   • INFLUENZA VACCINE  Addressed        Goals:   Goals        Patient Stated    • stay healthy (pt-stated)      Barriers to goals: none               Assessment/Plan:       Assessment/Plan   Maria Guadalupe was seen today for establish care.    Diagnoses and all orders for this visit:    Bilateral wrist pain  -     Ambulatory Referral to Physical Therapy Evaluate and treat  -     diclofenac (VOLTAREN) 1 % gel gel; Apply 4 g topically to the appropriate area as directed 4 (Four) Times a Day As Needed (pain).  -     Wheelchair Accessories  -     Elastic Bandages & Supports (WRIST SPLINT/LEFT SMALL) misc; 1 each Daily.  -     Elastic Bandages & Supports (WRIST SPLINT/RIGHT SMALL) misc; 1 each Daily.  -     Ambulatory Referral to Pain Management  -     XR Wrist 3+ View Bilateral (In Office)    Right hip pain  -     Ambulatory Referral to Physical Therapy Evaluate and treat  -     diclofenac (VOLTAREN) 1 % gel gel; Apply 4 g topically to the appropriate area as directed 4 (Four) Times a Day As Needed (pain).  -     Wheelchair Accessories  -     Ambulatory Referral to Pain Management  -     XR Hip With or Without Pelvis 2 - 3 View Right; Future    Osteogenesis imperfecta type III  -     Ambulatory Referral to Physical  Therapy Evaluate and treat  -     Wheelchair Accessories  -     Ambulatory Referral to Pain Management    Fatigue, unspecified type  -     TSH Rfx On Abnormal To Free T4; Future  -     Comprehensive Metabolic Panel; Future  -     CBC & Differential; Future    Screening for diabetes mellitus  -     Comprehensive Metabolic Panel; Future    Screening for hyperlipidemia  -     Lipid Panel; Future    Screening, anemia, deficiency, iron  -     CBC & Differential; Future      Wrist pain: PT, wrist splints, diclofenac, XR    Osteogenesis Imperfecta: new seat cushion and tires    Hip pain: XR today, possible ortho referral if abnormal     Referral to pain management for chronic pain     Will provide letter for emotional support animal.     Obtain labs today.    Follow up in one month.       FOLLOW-UP  Return in about 4 weeks (around 3/11/2020) for Recheck.      RISK SCORE: 4        This document has been electronically signed by Brinda Browne MD on February 12, 2020 4:40 PM

## 2020-06-08 ENCOUNTER — OFFICE VISIT (OUTPATIENT)
Dept: FAMILY MEDICINE CLINIC | Facility: CLINIC | Age: 45
End: 2020-06-08

## 2020-06-08 VITALS
SYSTOLIC BLOOD PRESSURE: 126 MMHG | DIASTOLIC BLOOD PRESSURE: 72 MMHG | BODY MASS INDEX: 23.84 KG/M2 | HEIGHT: 55 IN | OXYGEN SATURATION: 97 % | HEART RATE: 92 BPM | TEMPERATURE: 98.2 F | WEIGHT: 103 LBS

## 2020-06-08 DIAGNOSIS — M25.532 BILATERAL WRIST PAIN: ICD-10-CM

## 2020-06-08 DIAGNOSIS — R29.898 RIGHT HAND WEAKNESS: ICD-10-CM

## 2020-06-08 DIAGNOSIS — M25.551 RIGHT HIP PAIN: ICD-10-CM

## 2020-06-08 DIAGNOSIS — G43.709 CHRONIC MIGRAINE WITHOUT AURA WITHOUT STATUS MIGRAINOSUS, NOT INTRACTABLE: ICD-10-CM

## 2020-06-08 DIAGNOSIS — M25.531 BILATERAL WRIST PAIN: ICD-10-CM

## 2020-06-08 DIAGNOSIS — M25.511 CHRONIC RIGHT SHOULDER PAIN: Primary | ICD-10-CM

## 2020-06-08 DIAGNOSIS — G89.29 CHRONIC RIGHT SHOULDER PAIN: Primary | ICD-10-CM

## 2020-06-08 PROCEDURE — 99213 OFFICE O/P EST LOW 20 MIN: CPT | Performed by: STUDENT IN AN ORGANIZED HEALTH CARE EDUCATION/TRAINING PROGRAM

## 2020-06-08 RX ORDER — METOCLOPRAMIDE 10 MG/1
10 TABLET ORAL
Qty: 30 TABLET | Refills: 2 | OUTPATIENT
Start: 2020-06-08 | End: 2020-12-13

## 2020-06-08 RX ORDER — IBUPROFEN 400 MG/1
400 TABLET ORAL EVERY 6 HOURS PRN
Qty: 90 TABLET | Refills: 2 | OUTPATIENT
Start: 2020-06-08 | End: 2020-12-15

## 2020-06-08 RX ORDER — DIPHENHYDRAMINE HCL 25 MG
25 TABLET ORAL EVERY 6 HOURS PRN
Qty: 60 TABLET | Refills: 2 | OUTPATIENT
Start: 2020-06-08 | End: 2020-10-06

## 2020-06-08 NOTE — PROGRESS NOTES
"  Family Medicine Residency  Naila Su MD    Subjective:     Maria Guadalupe Gibson is a 45 y.o. female who presents for right shoulder pain, right hand weakness, migraine headaches with nausea and no aura, and need for emotional support animal note for living arrangements.    Right shoulder pain: Patient has had right shoulder pain for the past couple of months.  She was sent to physical therapy back in February, however with COVID, she was scared to go.  She has more range of motion per patient.  Her boyfriend had drawn little red \"Xs\" on her shoulder where she is having pain.     Right hand weakness: Patient has said that a couple weeks ago her right hand felt very itchy, and nothing she could do would help her.  Soon after that, she noted that her hand strength was decreasing and her muscles in her hand on the right side looked a lot smaller than the left side.  She endorses numbness to her fifth digit as well as her palmar and dorsal surface to her hand.    Migraine headaches with nausea no aura: Patient states she gets quite a few migraine headaches without an aura.  She says she will wake up with some stiffness and pain in her neck which will progress to a headache/migraine with nausea.  She states sometimes she has has headaches for up to 2 days.  She will take ibuprofen with minimal relief, and generally her migraines are fully resolved after sleeping.    Need for emotional support animal note: Patient has an emotional support animal, and orange cat named Ed to gives her emotional support.  She needs a note in order to remain in her current living establishment.    The following portions of the patient's history were reviewed and updated as appropriate: allergies, current medications, past family history, past medical history, past social history, past surgical history and problem list.    Past Medical Hx:  Past Medical History:   Diagnosis Date   • Agoraphobia with panic attacks    • Benign paroxysmal " positional vertigo    • Chronic pain    • Depression    • Depressive disorder    • External hemorrhoids without complication     excised   • Internal hemorrhoids     with prolapse      • Osteogenesis imperfecta type III     with dwarfism, non union R femur,Uses a wheelchair          Past Surgical Hx:  Past Surgical History:   Procedure Laterality Date   • COLONOSCOPY  11/15/2010    Colitis found in the entire colon. Biopsy taken. Normal terminal ileum. Biopsy taken   • ELBOW PROCEDURE Right    • FEMUR SURGERY Bilateral    • HEMORROIDECTOMY  09/17/2013    With excision of a single internal and external hemorrhoid.   • HERNIA REPAIR  09/21/2011    Large ventral hernia. Open ventral hernioplasty with mesh   • LUMBAR DISC SURGERY     • SPINE SURGERY Bilateral    • UPPER GASTROINTESTINAL ENDOSCOPY  11/15/2010    Normal esophagus.Gastritis found in the stomach. Biopsy taken. Normal duodenum. Biopsy taken       Current Meds:    Current Outpatient Medications:   •  diclofenac (VOLTAREN) 1 % gel gel, Apply 4 g topically to the appropriate area as directed 4 (Four) Times a Day As Needed (pain)., Disp: 100 g, Rfl: 2  •  diphenhydrAMINE (Benadryl Allergy) 25 MG tablet, Take 1 tablet by mouth Every 6 (Six) Hours As Needed for Itching., Disp: 60 tablet, Rfl: 2  •  Elastic Bandages & Supports (WRIST SPLINT/LEFT SMALL) misc, 1 each Daily., Disp: 1 each, Rfl: 0  •  Elastic Bandages & Supports (WRIST SPLINT/RIGHT SMALL) misc, 1 each Daily., Disp: 1 each, Rfl: 0  •  ibuprofen (ADVIL,MOTRIN) 400 MG tablet, Take 1 tablet by mouth Every 6 (Six) Hours As Needed for Mild Pain ., Disp: 90 tablet, Rfl: 2  •  metoclopramide (Reglan) 10 MG tablet, Take 1 tablet by mouth 4 (Four) Times a Day Before Meals & at Bedtime., Disp: 30 tablet, Rfl: 2    Allergies:  Allergies   Allergen Reactions   • Latex Anaphylaxis and Angioedema   • Codeine Hives       Family Hx:  Family History   Problem Relation Age of Onset   • Lymphoma Brother         Social  "History:  Social History     Socioeconomic History   • Marital status: Single     Spouse name: Not on file   • Number of children: Not on file   • Years of education: Not on file   • Highest education level: Not on file   Tobacco Use   • Smoking status: Current Every Day Smoker     Packs/day: 0.25   • Smokeless tobacco: Never Used   Substance and Sexual Activity   • Alcohol use: No     Comment: sober for 2.5 years    • Drug use: Yes     Frequency: 2.0 times per week     Types: Marijuana   • Sexual activity: Defer       Review of Systems  Review of Systems   Constitutional: Negative for activity change and appetite change.   HENT: Negative for congestion and ear pain.    Eyes: Negative for pain and discharge.   Respiratory: Negative for chest tightness and shortness of breath.    Cardiovascular: Negative for chest pain and palpitations.   Gastrointestinal: Negative for abdominal distention and abdominal pain.   Endocrine: Negative for cold intolerance and heat intolerance.   Genitourinary: Negative for difficulty urinating and dysuria.   Musculoskeletal: Positive for arthralgias. Negative for back pain.        Right shoulder pain   Skin: Negative for color change and rash.   Allergic/Immunologic: Negative for environmental allergies and food allergies.   Neurological: Positive for headaches. Negative for dizziness.   Hematological: Negative for adenopathy. Does not bruise/bleed easily.   Psychiatric/Behavioral: Negative for agitation and confusion.       Objective:     /72   Pulse 92   Temp 98.2 °F (36.8 °C) (Tympanic)   Ht 119.4 cm (47\")   Wt 46.7 kg (103 lb)   SpO2 97%   BMI 32.78 kg/m²   Physical Exam   Constitutional: She is oriented to person, place, and time. She appears well-developed and well-nourished.   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  She is wheelchair-bound.   HENT:   Head: Normocephalic and atraumatic.   Eyes: Pupils are equal, round, and reactive to light. EOM " are normal.   Neck: Normal range of motion. No JVD present. No tracheal deviation present. No thyromegaly present.   Cardiovascular: Normal rate, S1 normal, S2 normal, normal heart sounds and intact distal pulses.   Pulses:       Radial pulses are 2+ on the right side, and 2+ on the left side.        Dorsalis pedis pulses are 2+ on the right side, and 2+ on the left side.   Pulmonary/Chest: Effort normal and breath sounds normal.   Abdominal: Bowel sounds are normal.   Musculoskeletal:   Right lateral neck muscular tension  Right thenar eminence wasting  Unable to fully extend right elbow  Congenitally shortened lower extremities   Neurological: She is alert and oriented to person, place, and time. GCS eye subscore is 4. GCS verbal subscore is 5. GCS motor subscore is 6.   Skin: Skin is warm and dry. Capillary refill takes 2 to 3 seconds.   Psychiatric: She has a normal mood and affect. Her speech is normal and behavior is normal. Thought content normal.        Assessment/Plan:     Maria Guadalupe was seen today for shoulder pain.  A letter was provided to her for her emotional support animal.    Diagnoses and all orders for this visit:    Chronic right shoulder pain  -     diclofenac (VOLTAREN) 1 % gel gel; Apply 4 g topically to the appropriate area as directed 4 (Four) Times a Day As Needed (pain).  -     Ambulatory Referral to Physical Therapy Evaluate and treat  -     Ambulatory Referral to Occupational Therapy  -Instructed patient to practice exercises that they give her at PT/OT to help regain function and strength    Bilateral wrist pain  -     diclofenac (VOLTAREN) 1 % gel gel; Apply 4 g topically to the appropriate area as directed 4 (Four) Times a Day As Needed (pain).    Right hip pain  -     diclofenac (VOLTAREN) 1 % gel gel; Apply 4 g topically to the appropriate area as directed 4 (Four) Times a Day As Needed (pain).    Chronic migraine without aura without status migrainosus, not intractable  -      metoclopramide (Reglan) 10 MG tablet; Take 1 tablet by mouth 4 (Four) Times a Day Before Meals & at Bedtime.  -     diphenhydrAMINE (Benadryl Allergy) 25 MG tablet; Take 1 tablet by mouth Every 6 (Six) Hours As Needed for Itching.  -     ibuprofen (ADVIL,MOTRIN) 400 MG tablet; Take 1 tablet by mouth Every 6 (Six) Hours As Needed for Mild Pain .  -Instructed patient to repeat this regimen every 6 hours x 3, and if no relief go straight to the ED or call the clinic    Right hand weakness  -     Nerve Conduction Test 1 - 2; Future        · Rx changes: Add Voltaren gel.  Add ibuprofen, add Reglan, add Benadryl  · Patient Education: Actively engaged with PT/OT.  Describe nerve conduction studies  · Compliance at present is estimated to be good.   · Efforts to improve compliance (if necessary) will be directed at Participating in therapies.     Follow-up:     Return in about 2 weeks (around 6/22/2020).    Preventative:  Health Maintenance   Topic Date Due   • HEPATITIS C SCREENING  08/09/2017   • MEDICARE ANNUAL WELLNESS  08/09/2017   • PNEUMOCOCCAL VACCINE (19-64 MEDIUM RISK) (1 of 1 - PPSV23) 02/12/2021 (Originally 1/9/1995)   • INFLUENZA VACCINE  08/01/2020   • PAP SMEAR  02/06/2021   • TDAP/TD VACCINES (2 - Td) 06/06/2026     Female Preventative: Does monthly breast self examination  Recommended: none  Vaccine Counseling: N/A    Weight  -Class: Overweight: 25.0-29.9kg/m2   -Patient's Body mass index is 32.78 kg/m². BMI is above normal parameters. Recommendations include: nutrition counseling.   eat more fruits and vegetables    Alcohol use:  reports that she does not drink alcohol.  Nicotine status  reports that she has been smoking. She has been smoking about 0.25 packs per day. She has never used smokeless tobacco.    Goals     • stay healthy (pt-stated), resolve right hand weakness      Barriers to goals: none             RISK SCORE: 3    Naila Su MD PGY-1      This document has been electronically signed by  Naila Su MD on June 8, 2020 16:08    Part of this note may be an electronic transcription/translation of spoken language to printed text using the Dragon Dictation System.

## 2020-06-09 NOTE — PROGRESS NOTES
6/8/20    Subjective:     Maria Guadalupe Gibson is a 45 y.o. female who presents for   Chief Complaint   Patient presents with   • Shoulder Pain                  45-year-old female with history of osteogenesis imperfecta type III with dwarfism currently wheelchair-bound migraine headaches previously referred for PT with referral placed on hold due to coronavirus pandemic seen for scheduled follow-up visit for right shoulder pain with weakness in the right hand and migraine headaches without aura please see Dr. Su note for more detailed information regarding today's encounter.  As alluded to above patient is currently wheelchair-bound and propels herself using her arms and she is noticed weakness in the right upper extremity as well as numbness and tingling also complains of posterior cervical and occipital headaches patient is also requesting a note regarding her cat who is her emotional support animal she needs a note in order to keep the cat and not get kicked out of her apartment        Past Medical Hx:  Past Medical History:   Diagnosis Date   • Agoraphobia with panic attacks    • Benign paroxysmal positional vertigo    • Chronic pain    • Depression    • Depressive disorder    • External hemorrhoids without complication     excised   • Internal hemorrhoids     with prolapse      • Osteogenesis imperfecta type III     with dwarfism, non union R femur,Uses a wheelchair          Past Surgical Hx:  Past Surgical History:   Procedure Laterality Date   • COLONOSCOPY  11/15/2010    Colitis found in the entire colon. Biopsy taken. Normal terminal ileum. Biopsy taken   • ELBOW PROCEDURE Right    • FEMUR SURGERY Bilateral    • HEMORROIDECTOMY  09/17/2013    With excision of a single internal and external hemorrhoid.   • HERNIA REPAIR  09/21/2011    Large ventral hernia. Open ventral hernioplasty with mesh   • LUMBAR DISC SURGERY     • SPINE SURGERY Bilateral    • UPPER GASTROINTESTINAL ENDOSCOPY  11/15/2010    Normal  esophagus.Gastritis found in the stomach. Biopsy taken. Normal duodenum. Biopsy taken       Health Maintenance:  Health Maintenance   Topic Date Due   • HEPATITIS C SCREENING  08/09/2017   • MEDICARE ANNUAL WELLNESS  08/09/2017   • PNEUMOCOCCAL VACCINE (19-64 MEDIUM RISK) (1 of 1 - PPSV23) 02/12/2021 (Originally 1/9/1995)   • INFLUENZA VACCINE  08/01/2020   • PAP SMEAR  02/06/2021   • TDAP/TD VACCINES (2 - Td) 06/06/2026       Current Meds:    Current Outpatient Medications:   •  diclofenac (VOLTAREN) 1 % gel gel, Apply 4 g topically to the appropriate area as directed 4 (Four) Times a Day As Needed (pain)., Disp: 100 g, Rfl: 2  •  diphenhydrAMINE (Benadryl Allergy) 25 MG tablet, Take 1 tablet by mouth Every 6 (Six) Hours As Needed for Itching., Disp: 60 tablet, Rfl: 2  •  Elastic Bandages & Supports (WRIST SPLINT/LEFT SMALL) misc, 1 each Daily., Disp: 1 each, Rfl: 0  •  Elastic Bandages & Supports (WRIST SPLINT/RIGHT SMALL) misc, 1 each Daily., Disp: 1 each, Rfl: 0  •  ibuprofen (ADVIL,MOTRIN) 400 MG tablet, Take 1 tablet by mouth Every 6 (Six) Hours As Needed for Mild Pain ., Disp: 90 tablet, Rfl: 2  •  metoclopramide (Reglan) 10 MG tablet, Take 1 tablet by mouth 4 (Four) Times a Day Before Meals & at Bedtime., Disp: 30 tablet, Rfl: 2    Allergies:  Latex and Codeine    Family Hx:  Family History   Problem Relation Age of Onset   • Lymphoma Brother         Social History:  Social History     Socioeconomic History   • Marital status: Single     Spouse name: Not on file   • Number of children: Not on file   • Years of education: Not on file   • Highest education level: Not on file   Tobacco Use   • Smoking status: Current Every Day Smoker     Packs/day: 0.25   • Smokeless tobacco: Never Used   Substance and Sexual Activity   • Alcohol use: No     Comment: sober for 2.5 years    • Drug use: Yes     Frequency: 2.0 times per week     Types: Marijuana   • Sexual activity: Defer       Review of Systems  Review of Systems  "as per HPI patient also has thenar wasting in her right hand otherwise -12 systems review please also see Dr. Su note    Objective:     /72   Pulse 92   Temp 98.2 °F (36.8 °C) (Tympanic)   Ht 119.4 cm (47\")   Wt 46.7 kg (103 lb)   SpO2 97%   BMI 32.78 kg/m²   Physical Exam   General appearance is that of a small person with disproportionately short limbs she has numerous tattoos with several axes printed out and read on the back of her shoulder patient is awake and alert no distress ENT grossly normal otherwise without asymmetry neck supple with palpable muscle spasms posteriorly no adenopathy please see Dr. Su note for more detailed information regarding physical exam findings    Lab Review  Results for orders placed or performed during the hospital encounter of 03/14/19   Blood Culture - Blood, Arm, Left   Result Value Ref Range    Blood Culture No growth at 5 days    Blood Culture - Blood, Arm, Right   Result Value Ref Range    Blood Culture No growth at 5 days    Comprehensive Metabolic Panel   Result Value Ref Range    Glucose 91 60 - 100 mg/dL    BUN 12 7 - 21 mg/dL    Creatinine 0.33 (L) 0.50 - 1.00 mg/dL    Sodium 132 (L) 137 - 145 mmol/L    Potassium 3.6 3.5 - 5.1 mmol/L    Chloride 102 95 - 110 mmol/L    CO2 19.0 (L) 22.0 - 31.0 mmol/L    Calcium 8.4 8.4 - 10.2 mg/dL    Total Protein 7.3 6.3 - 8.6 g/dL    Albumin 4.10 3.40 - 4.80 g/dL    ALT (SGPT) 13 9 - 52 U/L    AST (SGOT) 17 14 - 36 U/L    Alkaline Phosphatase 72 38 - 126 U/L    Total Bilirubin 0.4 0.2 - 1.3 mg/dL    eGFR Non  Amer 218 (H) 58 - 135 mL/min/1.73    Globulin 3.2 2.3 - 3.5 gm/dL    A/G Ratio 1.3 1.1 - 1.8 g/dL    BUN/Creatinine Ratio 36.4 (H) 7.0 - 25.0    Anion Gap 11.0 5.0 - 15.0 mmol/L   Lactic Acid, Plasma   Result Value Ref Range    Lactate 2.1 (C) 0.5 - 2.0 mmol/L   CBC Auto Differential   Result Value Ref Range    WBC 9.97 3.40 - 10.80 10*3/mm3    RBC 3.97 3.77 - 5.28 10*6/mm3    Hemoglobin 11.8 (L) 12.0 - " 15.9 g/dL    Hematocrit 35.6 34.0 - 46.6 %    MCV 89.7 79.0 - 97.0 fL    MCH 29.7 26.6 - 33.0 pg    MCHC 33.1 31.5 - 35.7 g/dL    RDW 14.4 12.3 - 15.4 %    RDW-SD 47.0 37.0 - 54.0 fl    MPV 9.5 6.0 - 12.0 fL    Platelets 435 140 - 450 10*3/mm3    Neutrophil % 72.8 42.7 - 76.0 %    Lymphocyte % 16.5 (L) 19.6 - 45.3 %    Monocyte % 8.1 5.0 - 12.0 %    Eosinophil % 1.7 0.3 - 6.2 %    Basophil % 0.5 0.0 - 1.5 %    Immature Grans % 0.4 0.0 - 0.5 %    Neutrophils, Absolute 7.25 (H) 1.40 - 7.00 10*3/mm3    Lymphocytes, Absolute 1.65 0.70 - 3.10 10*3/mm3    Monocytes, Absolute 0.81 0.10 - 0.90 10*3/mm3    Eosinophils, Absolute 0.17 0.00 - 0.40 10*3/mm3    Basophils, Absolute 0.05 0.00 - 0.20 10*3/mm3    Immature Grans, Absolute 0.04 0.00 - 0.05 10*3/mm3    nRBC 0.0 0.0 - 0.0 /100 WBC   Lactic Acid, Reflex Timer (This will reflex a repeat order 3-3:15 hours after ordered.)   Result Value Ref Range    Extra Tube Hold for add-ons.    D-dimer, Quantitative   Result Value Ref Range    D-Dimer, Quantitative 440 0 - 470 ng/mL (FEU)   Lactic Acid, Reflex   Result Value Ref Range    Lactate 0.8 0.5 - 2.0 mmol/L   Basic Metabolic Panel   Result Value Ref Range    Glucose 105 (H) 60 - 100 mg/dL    BUN 10 7 - 21 mg/dL    Creatinine 0.35 (L) 0.50 - 1.00 mg/dL    Sodium 135 (L) 137 - 145 mmol/L    Potassium 3.7 3.5 - 5.1 mmol/L    Chloride 107 95 - 110 mmol/L    CO2 19.0 (L) 22.0 - 31.0 mmol/L    Calcium 8.1 (L) 8.4 - 10.2 mg/dL    eGFR Non  Amer 203 (H) 58 - 135 mL/min/1.73    BUN/Creatinine Ratio 28.6 (H) 7.0 - 25.0    Anion Gap 9.0 5.0 - 15.0 mmol/L   CBC Auto Differential   Result Value Ref Range    WBC 6.89 3.40 - 10.80 10*3/mm3    RBC 3.73 (L) 3.77 - 5.28 10*6/mm3    Hemoglobin 11.1 (L) 12.0 - 15.9 g/dL    Hematocrit 33.7 (L) 34.0 - 46.6 %    MCV 90.3 79.0 - 97.0 fL    MCH 29.8 26.6 - 33.0 pg    MCHC 32.9 31.5 - 35.7 g/dL    RDW 14.5 12.3 - 15.4 %    RDW-SD 47.8 37.0 - 54.0 fl    MPV 9.5 6.0 - 12.0 fL    Platelets 372 140 -  450 10*3/mm3    Neutrophil % 65.3 42.7 - 76.0 %    Lymphocyte % 20.6 19.6 - 45.3 %    Monocyte % 10.2 5.0 - 12.0 %    Eosinophil % 2.8 0.3 - 6.2 %    Basophil % 0.7 0.0 - 1.5 %    Immature Grans % 0.4 0.0 - 0.5 %    Neutrophils, Absolute 4.50 1.40 - 7.00 10*3/mm3    Lymphocytes, Absolute 1.42 0.70 - 3.10 10*3/mm3    Monocytes, Absolute 0.70 0.10 - 0.90 10*3/mm3    Eosinophils, Absolute 0.19 0.00 - 0.40 10*3/mm3    Basophils, Absolute 0.05 0.00 - 0.20 10*3/mm3    Immature Grans, Absolute 0.03 0.00 - 0.05 10*3/mm3    nRBC 0.0 0.0 - 0.0 /100 WBC   Light Blue Top   Result Value Ref Range    Extra Tube hold for add-on    Green Top (Gel)   Result Value Ref Range    Extra Tube Hold for add-ons.    Lavender Top   Result Value Ref Range    Extra Tube hold for add-on    Gold Top - SST   Result Value Ref Range    Extra Tube Hold for add-ons.             Assessment:     Maria Guadalupe was seen today for shoulder pain.    Diagnoses and all orders for this visit:    Chronic right shoulder pain  -     diclofenac (VOLTAREN) 1 % gel gel; Apply 4 g topically to the appropriate area as directed 4 (Four) Times a Day As Needed (pain).  -     Ambulatory Referral to Physical Therapy Evaluate and treat  -     Ambulatory Referral to Occupational Therapy    Bilateral wrist pain  -     diclofenac (VOLTAREN) 1 % gel gel; Apply 4 g topically to the appropriate area as directed 4 (Four) Times a Day As Needed (pain).    Right hip pain  -     diclofenac (VOLTAREN) 1 % gel gel; Apply 4 g topically to the appropriate area as directed 4 (Four) Times a Day As Needed (pain).    Chronic migraine without aura without status migrainosus, not intractable  -     metoclopramide (Reglan) 10 MG tablet; Take 1 tablet by mouth 4 (Four) Times a Day Before Meals & at Bedtime.  -     diphenhydrAMINE (Benadryl Allergy) 25 MG tablet; Take 1 tablet by mouth Every 6 (Six) Hours As Needed for Itching.  -     ibuprofen (ADVIL,MOTRIN) 400 MG tablet; Take 1 tablet by mouth Every  6 (Six) Hours As Needed for Mild Pain .    Right hand weakness  -     Nerve Conduction Test 1 - 2; Future        Plan:     I have seen and examined the patient.  I have reviewed the notes, assessments, and/or procedures performed by Dr. Su, I concur with her/his documentation and assessment and plan for Maria Guadalupe Gibson.            This document has been electronically signed by Kevon Reed MD on June 9, 2020 13:46

## 2020-07-13 ENCOUNTER — HOSPITAL ENCOUNTER (OUTPATIENT)
Dept: OCCUPATIONAL THERAPY | Facility: HOSPITAL | Age: 45
Setting detail: THERAPIES SERIES
Discharge: HOME | End: 2020-07-13

## 2020-07-13 ENCOUNTER — APPOINTMENT (OUTPATIENT)
Dept: PHYSICAL THERAPY | Facility: HOSPITAL | Age: 45
End: 2020-07-13

## 2020-07-13 ENCOUNTER — APPOINTMENT (OUTPATIENT)
Dept: OCCUPATIONAL THERAPY | Facility: HOSPITAL | Age: 45
End: 2020-07-13

## 2020-07-13 DIAGNOSIS — R29.898 POOR FINE MOTOR SKILLS: ICD-10-CM

## 2020-07-13 DIAGNOSIS — M25.611 DECREASED ROM OF RIGHT SHOULDER: ICD-10-CM

## 2020-07-13 DIAGNOSIS — R29.898 DECREASED GRIP STRENGTH OF RIGHT HAND: ICD-10-CM

## 2020-07-13 DIAGNOSIS — Q78.0 OSTEOGENESIS IMPERFECTA TYPE III: Primary | ICD-10-CM

## 2020-07-13 DIAGNOSIS — Z78.9 IMPAIRED INSTRUMENTAL ACTIVITIES OF DAILY LIVING (IADL): ICD-10-CM

## 2020-07-13 DIAGNOSIS — R20.1 IMPAIRED SENSATION: ICD-10-CM

## 2020-07-13 DIAGNOSIS — Z78.9 IMPAIRED MOBILITY AND ADLS: ICD-10-CM

## 2020-07-13 DIAGNOSIS — Z74.09 IMPAIRED MOBILITY AND ADLS: ICD-10-CM

## 2020-07-13 PROCEDURE — 97166 OT EVAL MOD COMPLEX 45 MIN: CPT

## 2020-07-13 NOTE — THERAPY EVALUATION
Outpatient Occupational Therapy Ortho Initial Evaluation  [facility]     Patient Name: Maria Guadalupe Gibson  : 1975  MRN: [id number]  Today's Date: 2020      Visit Date: 2020   Visits:  -OT initial evaluation   % Improvement: TBD  Re-cert Date: 8/3/2020  MD Appointment: TBD    Therapy Diagnosis: Impaired ADLs/IADLs, impaired RUE ROM/strength, decreased RUE FMC, decreased sensation.       Patient Active Problem List   Diagnosis   • Osteogenesis imperfecta type III   • Moderate episode of recurrent major depressive disorder (CMS/HCC)   • Cellulitis of left lower extremity   • Osteogenesis imperfecta        Past Medical History:   Diagnosis Date   • Agoraphobia with panic attacks    • Benign paroxysmal positional vertigo    • Chronic pain    • Depression    • Depressive disorder    • External hemorrhoids without complication     excised   • Internal hemorrhoids     with prolapse      • Osteogenesis imperfecta type III     with dwarfism, non union R femur,Uses a wheelchair           Past Surgical History:   Procedure Laterality Date   • COLONOSCOPY  11/15/2010    Colitis found in the entire colon. Biopsy taken. Normal terminal ileum. Biopsy taken   • ELBOW PROCEDURE Right    • FEMUR SURGERY Bilateral    • HEMORROIDECTOMY  2013    With excision of a single internal and external hemorrhoid.   • HERNIA REPAIR  2011    Large ventral hernia. Open ventral hernioplasty with mesh   • LUMBAR DISC SURGERY     • SPINE SURGERY Bilateral    • UPPER GASTROINTESTINAL ENDOSCOPY  11/15/2010    Normal esophagus.Gastritis found in the stomach. Biopsy taken. Normal duodenum. Biopsy taken         Visit Dx:    ICD-10-CM ICD-9-CM   1. Osteogenesis imperfecta type III Q78.0 756.51   2. Impaired mobility and ADLs Z74.09 V49.89    Z78.9    3. Impaired instrumental activities of daily living (IADL) Z78.9 V49.89   4. Decreased ROM of right shoulder M25.611 719.51   5. Decreased  strength of right hand  "R29.898 729.89   6. Impaired sensation R20.1 782.0   7. Poor fine motor skills R29.898 781.99       Patient History     Row Name 07/13/20 0935             History    Chief Complaint  Pain;Numbness;Muscle weakness;Impaired sensation;Fatigue/poor endurance;Tinglings  -KO      Type of Pain  Upper Extremity / Arm  -KO      Date Current Problem(s) Began  -- ~4 months ago  -KO      Brief Description of Current Complaint  Pt presents with R shoulder pain, decreased R  strength, and decreased ROM in RUE. Pt is diagnosed with osteogenesis imperfecta III and has been in a w/c for \"many years.\" Pt reports she can complete functional transfers. Decline in ADLs and functional mob in w/c d/t RUE pain/weakness. Feels after she had surgery on her spine ~6 yrs ago for scoliosis, nerve pain and weakness in RUE got worse.   -KO      Patient/Caregiver Goals  Improve strength;Improve mobility;Relieve pain;Return to prior level of function  -KO      Current Tobacco Use  yes  -KO      Smoking Status  1/4 a pack a day  -KO      Patient's Rating of General Health  Good  -KO      Hand Dominance  right-handed  -KO      Occupation/sports/leisure activities  outdoors, playing video games  -KO      What clinical tests have you had for this problem?  -- reporting no recent tests  -KO         Pain     Pain Location  Arm Right  -KO      Pain at Present  5  -KO      Pain at Best  0  -KO      Pain at Worst  10  -KO      Pain Description  Numbness;Shooting;Tingling  -KO      What Performance Factors Make the Current Problem(s) WORSE?  functional use  -KO      What Performance Factors Make the Current Problem(s) BETTER?  massage/rest  -KO      Is your sleep disturbed?  No  -KO      Is medication used to assist with sleep?  Yes  -KO      Total hours of sleep per night  ~8 hours  -KO      What position do you sleep in?  Right sidelying  -KO      Difficulties with ADL's?  brushing hair, makeup, shirt, eating  -KO         Fall Risk Assessment    Any " falls in the past year:  No  -KO      Does patient have a fear of falling  No  -KO         Services    Prior Rehab/Home Health Experiences  No  -KO      Are you currently receiving Home Health services  No  -KO      Do you plan to receive Home Health services in the near future  No  -KO         Daily Activities    Primary Language  English  -KO         Safety    Are you being hurt, hit, or frightened by anyone at home or in your life?  No  -KO      Are you being neglected by a caregiver  No  -KO        User Key  (r) = Recorded By, (t) = Taken By, (c) = Cosigned By    Initials Name Provider Type    Lucy Goncalves OT Occupational Therapist          OT Ortho     Row Name 07/13/20 0935             Subjective Comments    Subjective Comments  Pt reports pain varies throughout the day but cannot pinpoint what triggers it specifically.   -KO         Subjective Pain    Able to rate subjective pain?  yes  -KO      Pre-Treatment Pain Level  5  -KO      Post-Treatment Pain Level  5  -KO         Sensation    Light Touch  Partial deficits in the RUE  -KO         General ROM    RT Upper Ext  Rt Shoulder ABduction;Rt Shoulder Flexion  -KO      GENERAL ROM COMMENTS  LUE AROM WFLs, RUE shoulder flex and abd limited. Able to achieve full PROM.   -KO         Right Upper Ext    Rt Shoulder Abduction AROM  90  -KO      Rt Shoulder Flexion AROM  120  -KO         MMT (Manual Muscle Testing)    General MMT Comments  LUE grossly 4+/5, RUE grossly 3/5.   -KO        User Key  (r) = Recorded By, (t) = Taken By, (c) = Cosigned By    Initials Name Provider Type    Lucy Goncalves OT Occupational Therapist             Hand Therapy (last 24 hours)      Hand Eval     Row Name 07/13/20 0935             Hand  Strength     Strength Affected Side  Bilateral  -KO          Strength Right    Right  Test 1  40  -KO      Right  Test 2  35  -KO      Right  Test 3  35  -KO       Strength Average Right  36.67  -KO           Strength Left    Left  Test 1  55  -KO      Left  Test 2  55  -KO      Left  Test 3  55  -KO       Strength Average Left  55  -KO         Pinch Strength    Affected Side  Bilateral  -KO         Right Hand Strength - Pinch (lbs)    Lateral  10 lbs  -KO      Tip (2 point)  2 lbs  -KO      Three Jaw Gerald  4 lbs  -KO         Left Hand Strength - Pinch (lbs)    Lateral  14 lbs  -KO      Tip (2 point)  10 lbs  -KO      Three Jaw Gerald  18 lbs  -KO        User Key  (r) = Recorded By, (t) = Taken By, (c) = Cosigned By    Initials Name Provider Type    Lucy Goncalves, OT Occupational Therapist                   OT Goals     Row Name 07/13/20 1536 07/13/20 0935       OT Short Term Goals    STG Date to Achieve  --  08/03/20  -KO    STG 1  --  Pt will improve R shoulder flex (AROM) to 135 deg or greater to improve functional IND with ADLs/IADLs.  -KO    STG 1 Progress  --  New  -KO    STG 2  --  Pt will demonstrate the ability to virgil/doff and tie B shoes IND with no signs of difficulty or fatigue to improve IND with ADLs.  -KO    STG 2 Progress  --  New  -KO    STG 3  --  Pt will improve R  strength to 50lbs or greater to improve IND with functional tasks at home such as opening a jar.   -KO    STG 3 Progress  --  New  -KO    STG 4  --  Pt will simulate the ability to cut food IND, with or without AE, 3/3 attempts to improve IND with ADLs/IADLs.  -KO    STG 4 Progress  --  New  -KO    STG 5  --  Pt will demo the ability to IND virgil/doff overhead shirt with adaptive technique 3/3 attempts without signs or c/o pain in RUE to improve IND with ADLs.  -KO    STG 5 Progress  --  New  -KO       Long Term Goals    LTG 1  --  Pt will actively participate in HEP with emphasis on improving RUE strength, ROM, sensation deficits, and/or ADLs/IADLs.  -KO    LTG 1 Progress  --  New  -KO    LTG 2  --  Pt will improve 9 hole peg score to 18 secs or less with R hand to improve FMC required for ADLs/IADLs.  -KO    LTG 2  Progress  --  New  -KO    LTG 3  --  Pt will improve #5 on Quick Dash assessment to demo self improvements in basic ADL task.  -KO    LTG 3 Progress  --  New  -KO       Time Calculation    OT Goal Re-Cert Due Date  08/03/20  -KO  08/03/20  -KO      User Key  (r) = Recorded By, (t) = Taken By, (c) = Cosigned By    Initials Name Provider Type    Lucy Goncalves OT Occupational Therapist          OT Assessment/Plan     Row Name 07/13/20 0935          OT Assessment    Functional Limitations  Limitation in home management;Limitations in community activities;Performance in leisure activities;Performance in self-care ADL  -KO     Impairments  Endurance;Muscle strength;Pain;Range of motion;Sensation;Coordination  -KO     OT Diagnosis  Impaired ADLs/IADLs, impaired RUE ROM/strength, decreased RUE FMC, decreased sensation.   -KO     OT Rehab Potential  Good  -KO     Patient/caregiver participated in establishment of treatment plan and goals  Yes  -KO     Patient would benefit from skilled therapy intervention  Yes  -KO        OT Plan    OT Frequency  1x/week;2x/week  -KO     Predicted Duration of Therapy Intervention (Therapy Eval)  4-6wks with further TBD  -KO     Planned CPT's?  OT EVAL MOD COMPLEXITY: 68884;OT RE-EVAL: 82680;OT THER ACT EA 15 MIN: 99375YT;OT THER PROC EA 15 MIN: [id number];OT SELF CARE/MGMT/TRAIN 15 MIN: 20111;OT HOT/COLD PACK;OT SENS INTEGRATIVE TECH EACH 15 MIN: 44407;OT THER SUPP EA 15 MIN:  -KO     Planned Therapy Interventions (Optional Details)  home exercise program;motor coordination training;patient/family education;ROM (Range of Motion);strengthening;stretching;transfer training;joint mobilization  -KO     OT Plan Comments  Pt would benefit from skilled OT services 1-2x/wk for 4-6wks with further TBD.  -KO       User Key  (r) = Recorded By, (t) = Taken By, (c) = Cosigned By    Initials Name Provider Type    Lucy Goncalves OT Occupational Therapist             Assessment: Pt is a 45 yr old RHD  "female who presents with osteogenesis imperfecta III and pain in R shoulder. Pt reporting pain has gotten much worse in the past 4 months. Reporting ~6 yrs ago she has spinal surgery and feels her RUE has \"been going downhill\" ever since. Pt reports prior to ~4 months ago she was IND with all ADLs and functional mob of w/c. Now, pt reports difficulty brushing hair, feeding herself, don/doffing shirts, and tying B shoes. Pt lives with her  in a 1 level home with a ramped entrance. Pt has a walk-in shower with chair. Reporting she does not ambulate but can complete functional transfers herself. Reports transfers and self-propulsion of w/c are becoming more difficult d/t pain in RUE. Pt also reports having intermittent numbness/tingling in R 5th digit.     Noted significant weakness in R  strength and gross MMT than LUE. Pt demonstrates pain with Sosa Test and Billy-Salomon testing suggesting possible shoulder impingement and weakness of the supraspinatus muscle. Also reports pain during Belly Press test indicating weakness/pain in subscapularis. Pt would benefit from continued OT services to improve IND with ADLs/IADLs, improve R functional  strength, improve functional ROM of RUE, and further address sensory deficits in R hand. Without skilled OT intervention, pt is at risk for continued functional decline and overall decrease in quality of life.         Outcome Measure Options: 9 Hole Peg, Quick DASH  9 Hole Peg  9-Hole Peg Left: 17 secs  9-Hole Peg Right: 28 secs  Quick DASH  Open a tight or new jar.: Moderate Difficulty  Do heavy household chores (e.g., wash walls, wash floors): No Difficulty  Carry a shopping bag or briefcase: No Difficulty  Wash your back: No Difficulty  Use a knife to cut food: Severe Difficulty  Recreational activities in which you take some force or impact through your arm, should or hand (e.g. golf, hammering, tennis, etc.): No Difficulty  During the past week, to what " extent has your arm, shoulder, or hand problem interfered with your normal social activites with family, friends, neighbors or groups?: Not at all  During the past week, were you limited in your work or other regular daily activities as a result of your arm, shoulder or hand problem?: Slightly Limited  Arm, Shoulder, or hand pain: Extreme  Tingling (pins and needles) in your arm, shoulder, or hand: Extreme  During the past week, how much difficulty have you had sleeping because of the pain in your arm, shoulder or hand?: No difficulty  Number of Questions Answered: 11  Quick DASH Score: 31.82         Time Calculation:    OT Start Time: 0935  OT Stop Time: 1015  OT Time Calculation (min): 40 min     Therapy Charges for Today     Code Description Service Date Service Provider Modifiers Qty    [id number]  OT EVAL MOD COMPLEXITY 3 7/13/2020 Lucy Mullen OT GO 1                  Lucy Mullen OT  7/13/2020

## 2020-07-15 ENCOUNTER — APPOINTMENT (OUTPATIENT)
Dept: PHYSICAL THERAPY | Facility: HOSPITAL | Age: 45
End: 2020-07-15

## 2020-07-16 ENCOUNTER — HOSPITAL ENCOUNTER (OUTPATIENT)
Dept: PHYSICAL THERAPY | Facility: HOSPITAL | Age: 45
Setting detail: THERAPIES SERIES
Discharge: HOME | End: 2020-07-16

## 2020-07-16 DIAGNOSIS — M25.511 CHRONIC RIGHT SHOULDER PAIN: Primary | ICD-10-CM

## 2020-07-16 DIAGNOSIS — G89.29 CHRONIC RIGHT SHOULDER PAIN: Primary | ICD-10-CM

## 2020-07-16 PROCEDURE — 97161 PT EVAL LOW COMPLEX 20 MIN: CPT | Performed by: PHYSICAL THERAPIST

## 2020-07-16 PROCEDURE — 97110 THERAPEUTIC EXERCISES: CPT | Performed by: PHYSICAL THERAPIST

## 2020-07-16 NOTE — THERAPY EVALUATION
Outpatient Physical Therapy Ortho Initial Evaluation  [facility]     Patient Name: Maria Guadalupe Gibson  : 1975  MRN: [id number]  Today's Date: 2020      Visit Date: 2020  Attendance:  med nec  Subjective Improvement: NA  Next MD Appt: none scheduled  Recert Date: 20    Therapy Diagnosis: right shoulder pain  Allergies       LatexAnaphylaxis, Angioedema   CodeineHives   Damaso as Reviewed Reviewed by You at 8:51 AM CDT     Medications (Admitted on 2020)     diclofenac (VOLTAREN) 1 % gel gel     diphenhydrAMINE (Benadryl Allergy) 25 MG tablet     Elastic Bandages & Supports (WRIST SPLINT/LEFT SMALL) misc     Elastic Bandages & Supports (WRIST SPLINT/RIGHT SMALL) misc     ibuprofen (ADVIL,MOTRIN) 400 MG tablet     metoclopramide (Reglan) 10 MG tablet        Patient Active Problem List   Diagnosis   • Osteogenesis imperfecta type III   • Moderate episode of recurrent major depressive disorder (CMS/HCC)   • Cellulitis of left lower extremity   • Osteogenesis imperfecta        Past Medical History:   Diagnosis Date   • Agoraphobia with panic attacks    • Benign paroxysmal positional vertigo    • Chronic pain    • Depression    • Depressive disorder    • External hemorrhoids without complication     excised   • Internal hemorrhoids     with prolapse      • Osteogenesis imperfecta type III     with dwarfism, non union R femur,Uses a wheelchair           Past Surgical History:   Procedure Laterality Date   • COLONOSCOPY  11/15/2010    Colitis found in the entire colon. Biopsy taken. Normal terminal ileum. Biopsy taken   • ELBOW PROCEDURE Right    • FEMUR SURGERY Bilateral    • HEMORROIDECTOMY  2013    With excision of a single internal and external hemorrhoid.   • HERNIA REPAIR  2011    Large ventral hernia. Open ventral hernioplasty with mesh   • LUMBAR DISC SURGERY     • SPINE SURGERY Bilateral    • UPPER GASTROINTESTINAL ENDOSCOPY  11/15/2010    Normal esophagus.Gastritis  found in the stomach. Biopsy taken. Normal duodenum. Biopsy taken       Visit Dx:     ICD-10-CM ICD-9-CM   1. Chronic right shoulder pain M25.511 719.41    G89.29 338.29         Patient History     Row Name 07/16/20 0800             History    Chief Complaint  Pain;Muscle weakness;Numbness;Tinglings  -SW      Type of Pain  Shoulder pain;Upper Extremity / Arm  -SW      Brief Description of Current Complaint  Patient reports she has had a josé miguel from her neck to her tailbone since she was 14 years old and feels this caused some nerve damage in right UE. She has osteogenesis imperfects III.  Pain is intermittent. She feels prolonged sitting in w/c may precipitate it. Patient has been in wheelchair for past 13 years. Patient takes care of home. Patient has two children, 1 of which is still at home.   -SW      Patient/Caregiver Goals  Improve strength  -SW      Hand Dominance  right-handed  -SW         Pain     Pain at Present  3  -SW      Pain at Worst  10  -SW      Pain Description  Numbness;Shooting;Tingling  -SW      Is your sleep disturbed?  No  -SW      Is medication used to assist with sleep?  Yes  -SW      Difficulties with ADL's?  putting ponytail in hair, cutting food, writing  -SW         Fall Risk Assessment    Any falls in the past year:  No  -SW      Does patient have a fear of falling  No  -SW         Services    Prior Rehab/Home Health Experiences  No  -SW      Are you currently receiving Home Health services  No  -SW      Do you plan to receive Home Health services in the near future  No  -SW         Daily Activities    Primary Language  English  -SW      Barriers to learning  None  -SW      Pt Participated in POC and Goals  Yes  -SW         Safety    Are you being hurt, hit, or frightened by anyone at home or in your life?  No  -SW      Are you being neglected by a caregiver  No  -SW        User Key  (r) = Recorded By, (t) = Taken By, (c) = Cosigned By    Initials Name Provider Type    Jessica Azar  Physical Therapist          PT Ortho     Row Name 07/16/20 0900       Subjective Comments    Subjective Comments  Patient reports she has had a josé miguel from her neck to her tailbone since she was 14 years old and feels this caused some nerve damage in right UE. She has osteogenesis imperfects III.  Pain is intermittent. She feels prolonged sitting in w/c may precipitate it. Patient has been in wheelchair for past 13 years. Patient takes care of home. Patient has two children, 1 of which is still at home.   -SW       Subjective Pain    Able to rate subjective pain?  yes  -SW    Pre-Treatment Pain Level  3  -SW       Posture/Observations    Posture/Observations Comments  josé miguel in spine secodnary to scoliosis, in w/c secndary to non union at right femur for several years  -SW       Special Tests/Palpation    Special Tests/Palpation  -- TTP/red flex over pectoralis major and upper trap on right  -SW       Shoulder Impingement/Rotator Cuff Special Tests    Billy-Salomon Test (RC Lesion vs. Bursitis)  Right:;Negative  -SW    Neer Impingement Test (RC Lesion vs. Bursitis)  Right:;Negative  -SW    Full Can Test (RC Lesion)  Right:;Negative  -SW    Empty Can Test (RC Lesion)  Right:;Negative  -SW    Drop Arm Test (Full Thickness RC Lesion)  Right:;Negative  -SW    Lift-Off Test (Subscapularis Lesion)  Right:;Negative  -SW    Belly Press (Subscapularis Lesion)  Right:;Negative  -SW    Speed's Test (LH of Biceps Lesion)  Right:;Negative  -SW    Yergason Test (LH of Biceps Lesion)  Right:;Negative  -SW       Biceps/Labral Special Tests    [place]'s Test (Labral Test)  Right:;Negative  -SW    Speed's Test (Biceps Tendinopathy vs. Labral Tear)  Right:;Negative  -SW    Yergason's Test (Biceps Tendinopathy vs. Labral Tear)  Right:;Negative  -SW    Biceps II (Biceps Tendinopathy vs. Labral Tear)  Right:;Negative  -SW       Right Upper Ext    Rt Shoulder Abduction AROM  90  -SW    Rt Shoulder Abduction PROM  90  -SW    Rt Shoulder  Flexion AROM  120  -SW    Rt Shoulder Flexion PROM  120  -SW    Rt Shoulder External Rotation AROM  occiput  -SW    Rt Shoulder External Rotation PROM  90@90  -SW    Rt Shoulder Internal Rotation AROM  T7  -SW    Rt Shoulder Internal Rotation PROM  70@90  -SW       MMT Right Upper Ext    Rt Shoulder Flexion MMT, Gross Movement  (4/5) good  -SW    Rt Shoulder Extension MMT, Gross Movement  (5/5) normal  -SW    Rt Shoulder ABduction MMT, Gross Movement  (4/5) good  -SW    Rt Shoulder Internal Rotation MMT, Gross Movement  (4/5) good  -SW    Rt Shoulder External Rotation MMT, Gross Movement  (4/5) good  -SW      User Key  (r) = Recorded By, (t) = Taken By, (c) = Cosigned By    Initials Name Provider Type    Jessica Azar Physical Therapist                      Therapy Education  Education Details: HEP per flowsheet today  Given: HEP, Symptoms/condition management  Program: New  How Provided: Verbal, Demonstration, Written  Provided to: Patient  Level of Understanding: Teach back education performed, Verbalized, Demonstrated     PT OP Goals     Row Name 07/16/20 0800          PT Short Term Goals    STG Date to Achieve  08/06/20  -     STG 1  Patient will be independent with HEP.   -        Long Term Goals    LTG Date to Achieve  08/27/20  -     LTG 1  Patient will score 35 on quickdash.   -     LTG 2  Patient will report worst pain at 5 or below.   -     LTG 3  Patient will exhibit 5/5 right shoulder strength all directions.   -     LTG 4  Patient will exhibit only mild tightness in right pectoralis and right upper trapezius musculature.   -     LTG 5  Patient will exhibit active right shoulder flexion to 135 degrees and abduction to 105 degrees.   -        Time Calculation    PT Goal Re-Cert Due Date  08/06/20  -       User Key  (r) = Recorded By, (t) = Taken By, (c) = Cosigned By    Initials Name Provider Type    Jessica Azar Physical Therapist          PT Assessment/Plan     Row Name 07/16/20  0900 07/16/20 0800       PT Assessment    Functional Limitations  Decreased safety during functional activities;Limitation in home management;Limitations in community activities;Limitations in functional capacity and performance;Performance in leisure activities;Performance in self-care ADL  -SW  --    Impairments  Impaired muscle length;Joint mobility;Muscle strength;Pain;Poor body mechanics;Range of motion  -SW  --    Assessment Comments  45yof with history of osteogenesis imperfecta III and scoliosis presents with right shoulder and right UE pain with mild weakness in right shoulder and tightness in upper trapezius and pectoralis musculature as well as reduced flexion and abduction ROM. Treatment will focus on these impairments.    -SW  --    Rehab Potential  Good  -SW  --    Patient/caregiver participated in establishment of treatment plan and goals  Yes  -SW  --    Patient would benefit from skilled therapy intervention  Yes  -SW  --       PT Plan    PT Frequency  -- every 3 weeks due to high copay  -SW  2x/week  -SW    Predicted Duration of Therapy Intervention (Therapy Eval)  12 weeks  -SW  --    Planned CPT's?  PT EVAL LOW COMPLEXITY: 65757;PT RE-EVAL: 92489;PT THER PROC EA 15 MIN: 79306;PT THER ACT EA 15 MIN: 33310;PT MANUAL THERAPY EA 15 MIN: 40049;PT NEUROMUSC RE-EDUCATION EA 15 MIN: 47022;PT SELF CARE/HOME MGMT/TRAIN EA 15: 45691;PT HOT OR COLD PACK TREAT MCARE  -SW  --    Physical Therapy Interventions (Optional Details)  home exercise program;manual therapy techniques;neuromuscular re-education;patient/family education;ROM (Range of Motion);strengthening;stretching  -SW  --    PT Plan Comments  FU in 3 weeks for update of HEP.   -SW  --      User Key  (r) = Recorded By, (t) = Taken By, (c) = Cosigned By    Initials Name Provider Type    Jessica Azar Physical Therapist            OP Exercises     Row Name 07/16/20 0900             Subjective Comments    Subjective Comments  Patient reports she has  "had a josé miguel from her neck to her tailbone since she was 14 years old and feels this caused some nerve damage in right UE. She has osteogenesis imperfects III.  Pain is intermittent. She feels prolonged sitting in w/c may precipitate it. Patient has been in wheelchair for past 13 years. Patient takes care of home. Patient has two children, 1 of which is still at home.   -SW         Subjective Pain    Able to rate subjective pain?  yes  -SW      Pre-Treatment Pain Level  3  -SW         Exercise 1    Exercise Name 1  supine pect stretch hands behind head  -SW      Reps 1  1x1'  -SW         Exercise 2    Exercise Name 2  supine y pect stretch  -SW      Reps 2  1x1'  -SW         Exercise 3    Exercise Name 3  supine t pect stretch  -SW      Reps 3  1x1'  -SW         Exercise 4    Exercise Name 4  backward shoulder rolls  -SW      Reps 4  20  -SW         Exercise 5    Exercise Name 5  scapular retraction  -SW      Reps 5  20  -SW         Exercise 6    Exercise Name 6  upper trap stretch   -SW      Reps 6  30\"x2 ea side  -SW        User Key  (r) = Recorded By, (t) = Taken By, (c) = Cosigned By    Initials Name Provider Type    Jessica Azar Physical Therapist                        Outcome Measure Options: Quick DASH  Quick DASH  Open a tight or new jar.: Severe Difficulty  Do heavy household chores (e.g., wash walls, wash floors): No Difficulty  Carry a shopping bag or briefcase: No Difficulty  Wash your back: Severe Difficulty  Use a knife to cut food: Severe Difficulty  Recreational activities in which you take some force or impact through your arm, should or hand (e.g. golf, hammering, tennis, etc.): Mild Difficulty  During the past week, to what extent has your arm, shoulder, or hand problem interfered with your normal social activites with family, friends, neighbors or groups?: Moderately  During the past week, were you limited in your work or other regular daily activities as a result of your arm, shoulder or hand " problem?: Very limited  Arm, Shoulder, or hand pain: Extreme  Tingling (pins and needles) in your arm, shoulder, or hand: Extreme  During the past week, how much difficulty have you had sleeping because of the pain in your arm, shoulder or hand?: Mild Difficulty  Number of Questions Answered: 11  Quick DASH Score: 54.55         Time Calculation:     Start Time: 0847  Stop Time: 0925  Time Calculation (min): 38 min     Therapy Charges for Today     Code Description Service Date Service Provider Modifiers Qty    07931853112 HC PT EVAL LOW COMPLEXITY 1 7/16/2020 Jessica Shafer GP 1    68622272230 HC PT THER PROC EA 15 MIN 7/16/2020 Jessica Shafer GP 2          PT G-Codes  Outcome Measure Options: Quick DASH  Quick DASH Score: 54.55         Jessica Shafer  7/16/2020

## 2020-08-26 ENCOUNTER — APPOINTMENT (OUTPATIENT)
Dept: PHYSICAL THERAPY | Facility: HOSPITAL | Age: 45
End: 2020-08-26

## 2020-11-19 ENCOUNTER — TELEPHONE (OUTPATIENT)
Dept: FAMILY MEDICINE CLINIC | Facility: CLINIC | Age: 45
End: 2020-11-19

## 2020-11-19 ENCOUNTER — OFFICE VISIT (OUTPATIENT)
Dept: FAMILY MEDICINE CLINIC | Facility: CLINIC | Age: 45
End: 2020-11-19

## 2020-11-19 VITALS
SYSTOLIC BLOOD PRESSURE: 108 MMHG | DIASTOLIC BLOOD PRESSURE: 78 MMHG | HEART RATE: 72 BPM | HEIGHT: 55 IN | OXYGEN SATURATION: 100 % | BODY MASS INDEX: 39.78 KG/M2

## 2020-11-19 DIAGNOSIS — S73.001D CHRONIC SUBLUXATION OF RIGHT HIP, SUBSEQUENT ENCOUNTER: ICD-10-CM

## 2020-11-19 DIAGNOSIS — Q78.0 OSTEOGENESIS IMPERFECTA TYPE III: Primary | ICD-10-CM

## 2020-11-19 PROCEDURE — 99213 OFFICE O/P EST LOW 20 MIN: CPT | Performed by: STUDENT IN AN ORGANIZED HEALTH CARE EDUCATION/TRAINING PROGRAM

## 2020-11-19 RX ORDER — MELOXICAM 7.5 MG/1
7.5 TABLET ORAL DAILY
Qty: 30 TABLET | Refills: 1 | OUTPATIENT
Start: 2020-11-19 | End: 2020-12-13

## 2020-11-19 NOTE — TELEPHONE ENCOUNTER
PATIENT CAME IN TO THE OFFICE ASKING ABOUT THE WHEELCHAIR PARTS THAT WERE TO BE CALLED IN TO THE PHARMACY.  PATIENT SAID THEY NEVER GOT THEM.    CALL BACK NUMBER FOR PATIENT -[phone].    THANKS,  NGOZI

## 2020-11-20 NOTE — PROGRESS NOTES
Family Medicine Residency  Naila Su MD    Subjective:     Maria Guadalupe Gibson is a 45 y.o. female who presents for right hip pain related to chronic subluxation from her osteogenesis imperfecta.    Patient has had increasing right hip pain for the past couple weeks.  She had surgery on this hip 14 years ago, and was doing well until about 2 years ago her pain started intermittently.  She had x-rays done in [place] yesterday and is requesting urgent referral to [facility] to her and know why specialist to repair her hip.  She complains of discomfort in virtually every position, sitting, lying, and has a grinding sensation with pain at her right hip when she is trying to sleep and move about.    The following portions of the patient's history were reviewed and updated as appropriate: allergies, current medications, past family history, past medical history, past social history, past surgical history and problem list.    Past Medical Hx:  Past Medical History:   Diagnosis Date   • Agoraphobia with panic attacks    • Benign paroxysmal positional vertigo    • Chronic pain    • Depression    • Depressive disorder    • External hemorrhoids without complication     excised   • Internal hemorrhoids     with prolapse      • Osteogenesis imperfecta type III     with dwarfism, non union R femur,Uses a wheelchair          Past Surgical Hx:  Past Surgical History:   Procedure Laterality Date   • COLONOSCOPY  11/15/2010    Colitis found in the entire colon. Biopsy taken. Normal terminal ileum. Biopsy taken   • ELBOW PROCEDURE Right    • FEMUR SURGERY Bilateral    • HEMORROIDECTOMY  09/17/2013    With excision of a single internal and external hemorrhoid.   • HERNIA REPAIR  09/21/2011    Large ventral hernia. Open ventral hernioplasty with mesh   • LUMBAR DISC SURGERY     • SPINE SURGERY Bilateral    • UPPER GASTROINTESTINAL ENDOSCOPY  11/15/2010    Normal esophagus.Gastritis found in the stomach.  Biopsy taken. Normal duodenum. Biopsy taken       Current Meds:    Current Outpatient Medications:   •  diclofenac (VOLTAREN) 1 % gel gel, Apply 4 g topically to the appropriate area as directed 4 (Four) Times a Day As Needed (pain)., Disp: 100 g, Rfl: 2  •  Elastic Bandages & Supports (WRIST SPLINT/LEFT SMALL) misc, 1 each Daily., Disp: 1 each, Rfl: 0  •  Elastic Bandages & Supports (WRIST SPLINT/RIGHT SMALL) misc, 1 each Daily., Disp: 1 each, Rfl: 0  •  ibuprofen (ADVIL,MOTRIN) 400 MG tablet, Take 1 tablet by mouth Every 6 (Six) Hours As Needed for Mild Pain ., Disp: 90 tablet, Rfl: 2  •  metoclopramide (Reglan) 10 MG tablet, Take 1 tablet by mouth 4 (Four) Times a Day Before Meals & at Bedtime., Disp: 30 tablet, Rfl: 2  •  SUMAtriptan (IMITREX) 50 MG tablet, Take 1 tablet by mouth As Needed for Migraine. Take one tablet at onset of headache. May repeat dose one time in 2 hours if headache not relieved., Disp: 30 tablet, Rfl: 0  •  meloxicam (Mobic) 7.5 MG tablet, Take 1 tablet by mouth Daily., Disp: 30 tablet, Rfl: 1    Allergies:  Allergies   Allergen Reactions   • Latex Anaphylaxis and Angioedema   • Codeine Hives       Family Hx:  Family History   Problem Relation Age of Onset   • Lymphoma Brother         Social History:  Social History     Socioeconomic History   • Marital status: Single     Spouse name: Not on file   • Number of children: Not on file   • Years of education: Not on file   • Highest education level: Not on file   Tobacco Use   • Smoking status: Current Every Day Smoker     Packs/day: 0.25   • Smokeless tobacco: Never Used   Substance and Sexual Activity   • Alcohol use: No     Comment: sober for 2.5 years    • Drug use: Yes     Frequency: 2.0 times per week     Types: Marijuana   • Sexual activity: Defer       Review of Systems  Review of Systems   Constitutional: Negative for activity change and appetite change.   HENT: Negative for congestion and ear pain.    Eyes: Negative for pain and  "discharge.   Respiratory: Negative for chest tightness and shortness of breath.    Cardiovascular: Negative for chest pain and palpitations.   Gastrointestinal: Negative for abdominal distention and abdominal pain.   Endocrine: Negative for cold intolerance and heat intolerance.   Genitourinary: Negative for difficulty urinating and dysuria.   Musculoskeletal: Positive for arthralgias (right hip pain). Negative for back pain.   Skin: Negative for color change and rash.   Allergic/Immunologic: Negative for environmental allergies and food allergies.   Neurological: Negative for dizziness and headaches.   Hematological: Negative for adenopathy. Does not bruise/bleed easily.   Psychiatric/Behavioral: Negative for agitation and confusion.       Objective:     /78   Pulse 72   Ht 119.4 cm (47\")   SpO2 100%   BMI 39.78 kg/m²   Physical Exam  Constitutional:       Appearance: She is well-developed.   HENT:      Head: Normocephalic and atraumatic.   Eyes:      Pupils: Pupils are equal, round, and reactive to light.   Neck:      Thyroid: No thyromegaly.      Vascular: No JVD.      Trachea: No tracheal deviation.   Cardiovascular:      Rate and Rhythm: Normal rate.      Pulses:           Radial pulses are 2+ on the right side and 2+ on the left side.        Dorsalis pedis pulses are 2+ on the right side and 2+ on the left side.      Heart sounds: Normal heart sounds, S1 normal and S2 normal.   Pulmonary:      Effort: Pulmonary effort is normal.      Breath sounds: Normal breath sounds.   Abdominal:      General: Bowel sounds are normal.   Musculoskeletal:         General: Tenderness (right hip pain.) present.      Comments: Short stature, OI 3.   Skin:     General: Skin is warm and dry.      Capillary Refill: Capillary refill takes 2 to 3 seconds.   Neurological:      Mental Status: She is alert and oriented to person, place, and time.      GCS: GCS eye subscore is 4. GCS verbal subscore is 5. GCS motor subscore is " 6.   Psychiatric:         Speech: Speech normal.         Behavior: Behavior normal.         Thought Content: Thought content normal.          Assessment/Plan:     Diagnoses and all orders for this visit: Patient had her last surgery done at [facility] and is requesting urgent referral there.  We will also send referral to our orthopedic surgeons to see if they want to stabilize her hip should [facility] not be able to get her in.    1. Osteogenesis imperfecta type III (Primary)  -     Miscellaneous DME.  Sent prescription over via fax for wheelchair parts including tires and seat.    2. Chronic subluxation of right hip, subsequent encounter  -     Ambulatory Referral to Orthopedic Surgery to both [place] and [facility] for repair of chronic subluxation of right hip  -     meloxicam (Mobic) 7.5 MG tablet; Take 1 tablet by mouth Daily.  Dispense: 30 tablet; Refill: 1        · Rx changes: Add Mobic  · Patient Education: Follow-ups  Compliance at present is estimated to be good.   Follow-up:     Return in about 4 weeks (around 12/17/2020) for Recheck.    Preventative:  Health Maintenance   Topic Date Due   • Pneumococcal Vaccine 0-64 (1 of 1 - PPSV23) 01/09/1982   • HEPATITIS C SCREENING  08/09/2017   • ANNUAL WELLNESS VISIT  08/09/2017   • INFLUENZA VACCINE  11/19/2021 (Originally 8/1/2020)   • PAP SMEAR  02/06/2021   • TDAP/TD VACCINES (2 - Td) 06/06/2026     Female Preventative: Exercises regularly  Recommended: Pneumovax  Vaccine Counseling: N/A    Weight  -Class: Normal: 18.5-24.9kg/m2  -Patient's Body mass index is 39.78 kg/m². BMI is within normal parameters. No follow-up required..    Alcohol use:  reports no history of alcohol use.  Nicotine status  reports that she has been smoking. She has been smoking about 0.25 packs per day. She has never used smokeless tobacco.    Goals     • stay healthy (pt-stated)      Resolve right hip issue            RISK SCORE: 3    Jinee  MD Georges PGY-2    This document has been electronically signed by Naila Su MD on November 20, 2020 10:01 CST    Part of this note may be an electronic transcription/translation of spoken language to printed text using the Dragon Dictation System.

## 2020-11-20 NOTE — PROGRESS NOTES
Maria Guadalupe Gibson  11/19/20  Subjective:     Maria Guadalupe Gibson is a 45 y.o. female who presents for   Chief Complaint   Patient presents with   • Hip Injury     RIGHT FEMUR PROBLEM        45-year-old female with history of osteogenesis imperfecta with chronic hip pain post surgery 14 years ago presenting with worsening right hip pain patient is unable to find a comfortable position and is requesting referral for pertinent evaluation please see Dr. Su note for more detailed information regarding today's encounter.        Past Medical Hx:  Past Medical History:   Diagnosis Date   • Agoraphobia with panic attacks    • Benign paroxysmal positional vertigo    • Chronic pain    • Depression    • Depressive disorder    • External hemorrhoids without complication     excised   • Internal hemorrhoids     with prolapse      • Osteogenesis imperfecta type III     with dwarfism, non union R femur,Uses a wheelchair          Past Surgical Hx:  Past Surgical History:   Procedure Laterality Date   • COLONOSCOPY  11/15/2010    Colitis found in the entire colon. Biopsy taken. Normal terminal ileum. Biopsy taken   • ELBOW PROCEDURE Right    • FEMUR SURGERY Bilateral    • HEMORROIDECTOMY  09/17/2013    With excision of a single internal and external hemorrhoid.   • HERNIA REPAIR  09/21/2011    Large ventral hernia. Open ventral hernioplasty with mesh   • LUMBAR DISC SURGERY     • SPINE SURGERY Bilateral    • UPPER GASTROINTESTINAL ENDOSCOPY  11/15/2010    Normal esophagus.Gastritis found in the stomach. Biopsy taken. Normal duodenum. Biopsy taken       Health Maintenance:  Health Maintenance   Topic Date Due   • Pneumococcal Vaccine 0-64 (1 of 1 - PPSV23) 01/09/1982   • HEPATITIS C SCREENING  08/09/2017   • ANNUAL WELLNESS VISIT  08/09/2017   • INFLUENZA VACCINE  11/19/2021 (Originally 8/1/2020)   • PAP SMEAR  02/06/2021   • TDAP/TD VACCINES (2 - Td) 06/06/2026       Current Meds:    Current Outpatient Medications:   •   "diclofenac (VOLTAREN) 1 % gel gel, Apply 4 g topically to the appropriate area as directed 4 (Four) Times a Day As Needed (pain)., Disp: 100 g, Rfl: 2  •  Elastic Bandages & Supports (WRIST SPLINT/LEFT SMALL) misc, 1 each Daily., Disp: 1 each, Rfl: 0  •  Elastic Bandages & Supports (WRIST SPLINT/RIGHT SMALL) misc, 1 each Daily., Disp: 1 each, Rfl: 0  •  ibuprofen (ADVIL,MOTRIN) 400 MG tablet, Take 1 tablet by mouth Every 6 (Six) Hours As Needed for Mild Pain ., Disp: 90 tablet, Rfl: 2  •  metoclopramide (Reglan) 10 MG tablet, Take 1 tablet by mouth 4 (Four) Times a Day Before Meals & at Bedtime., Disp: 30 tablet, Rfl: 2  •  SUMAtriptan (IMITREX) 50 MG tablet, Take 1 tablet by mouth As Needed for Migraine. Take one tablet at onset of headache. May repeat dose one time in 2 hours if headache not relieved., Disp: 30 tablet, Rfl: 0  •  meloxicam (Mobic) 7.5 MG tablet, Take 1 tablet by mouth Daily., Disp: 30 tablet, Rfl: 1    Allergies:  Latex and Codeine    Family Hx:  Family History   Problem Relation Age of Onset   • Lymphoma Brother         Social History:  Social History     Socioeconomic History   • Marital status: Single     Spouse name: Not on file   • Number of children: Not on file   • Years of education: Not on file   • Highest education level: Not on file   Tobacco Use   • Smoking status: Current Every Day Smoker     Packs/day: 0.25   • Smokeless tobacco: Never Used   Substance and Sexual Activity   • Alcohol use: No     Comment: sober for 2.5 years    • Drug use: Yes     Frequency: 2.0 times per week     Types: Marijuana   • Sexual activity: Defer       Review of Systems  Review of Systems    Objective:     /78   Pulse 72   Ht 119.4 cm (47\")   SpO2 100%   BMI 39.78 kg/m²   Physical Exam   General appearance is that of a small statured adult female she is awake and alert no distress please see Dr. Su note for more detailed information regarding physical exam findings    Lab Review  Results for " orders placed or performed during the hospital encounter of 03/14/19   Blood Culture - Blood, Arm, Left    Specimen: Arm, Left; Blood   Result Value Ref Range    Blood Culture No growth at 5 days    Blood Culture - Blood, Arm, Right    Specimen: Arm, Right; Blood   Result Value Ref Range    Blood Culture No growth at 5 days    Comprehensive Metabolic Panel    Specimen: Blood   Result Value Ref Range    Glucose 91 60 - 100 mg/dL    BUN 12 7 - 21 mg/dL    Creatinine 0.33 (L) 0.50 - 1.00 mg/dL    Sodium 132 (L) 137 - 145 mmol/L    Potassium 3.6 3.5 - 5.1 mmol/L    Chloride 102 95 - 110 mmol/L    CO2 19.0 (L) 22.0 - 31.0 mmol/L    Calcium 8.4 8.4 - 10.2 mg/dL    Total Protein 7.3 6.3 - 8.6 g/dL    Albumin 4.10 3.40 - 4.80 g/dL    ALT (SGPT) 13 9 - 52 U/L    AST (SGOT) 17 14 - 36 U/L    Alkaline Phosphatase 72 38 - 126 U/L    Total Bilirubin 0.4 0.2 - 1.3 mg/dL    eGFR Non  Amer 218 (H) 58 - 135 mL/min/1.73    Globulin 3.2 2.3 - 3.5 gm/dL    A/G Ratio 1.3 1.1 - 1.8 g/dL    BUN/Creatinine Ratio 36.4 (H) 7.0 - 25.0    Anion Gap 11.0 5.0 - 15.0 mmol/L   Lactic Acid, Plasma    Specimen: Blood   Result Value Ref Range    Lactate 2.1 (C) 0.5 - 2.0 mmol/L   CBC Auto Differential    Specimen: Blood   Result Value Ref Range    WBC 9.97 3.40 - 10.80 10*3/mm3    RBC 3.97 3.77 - 5.28 10*6/mm3    Hemoglobin 11.8 (L) 12.0 - 15.9 g/dL    Hematocrit 35.6 34.0 - 46.6 %    MCV 89.7 79.0 - 97.0 fL    MCH 29.7 26.6 - 33.0 pg    MCHC 33.1 31.5 - 35.7 g/dL    RDW 14.4 12.3 - 15.4 %    RDW-SD 47.0 37.0 - 54.0 fl    MPV 9.5 6.0 - 12.0 fL    Platelets 435 140 - 450 10*3/mm3    Neutrophil % 72.8 42.7 - 76.0 %    Lymphocyte % 16.5 (L) 19.6 - 45.3 %    Monocyte % 8.1 5.0 - 12.0 %    Eosinophil % 1.7 0.3 - 6.2 %    Basophil % 0.5 0.0 - 1.5 %    Immature Grans % 0.4 0.0 - 0.5 %    Neutrophils, Absolute 7.25 (H) 1.40 - 7.00 10*3/mm3    Lymphocytes, Absolute 1.65 0.70 - 3.10 10*3/mm3    Monocytes, Absolute 0.81 0.10 - 0.90 10*3/mm3     Eosinophils, Absolute 0.17 0.00 - 0.40 10*3/mm3    Basophils, Absolute 0.05 0.00 - 0.20 10*3/mm3    Immature Grans, Absolute 0.04 0.00 - 0.05 10*3/mm3    nRBC 0.0 0.0 - 0.0 /100 WBC   Lactic Acid, Reflex Timer (This will reflex a repeat order 3-3:15 hours after ordered.)    Specimen: Blood   Result Value Ref Range    Extra Tube Hold for add-ons.    D-dimer, Quantitative    Specimen: Blood   Result Value Ref Range    D-Dimer, Quantitative 440 0 - 470 ng/mL (FEU)   Lactic Acid, Reflex    Specimen: Blood   Result Value Ref Range    Lactate 0.8 0.5 - 2.0 mmol/L   Basic Metabolic Panel    Specimen: Blood   Result Value Ref Range    Glucose 105 (H) 60 - 100 mg/dL    BUN 10 7 - 21 mg/dL    Creatinine 0.35 (L) 0.50 - 1.00 mg/dL    Sodium 135 (L) 137 - 145 mmol/L    Potassium 3.7 3.5 - 5.1 mmol/L    Chloride 107 95 - 110 mmol/L    CO2 19.0 (L) 22.0 - 31.0 mmol/L    Calcium 8.1 (L) 8.4 - 10.2 mg/dL    eGFR Non  Amer 203 (H) 58 - 135 mL/min/1.73    BUN/Creatinine Ratio 28.6 (H) 7.0 - 25.0    Anion Gap 9.0 5.0 - 15.0 mmol/L   CBC Auto Differential    Specimen: Blood   Result Value Ref Range    WBC 6.89 3.40 - 10.80 10*3/mm3    RBC 3.73 (L) 3.77 - 5.28 10*6/mm3    Hemoglobin 11.1 (L) 12.0 - 15.9 g/dL    Hematocrit 33.7 (L) 34.0 - 46.6 %    MCV 90.3 79.0 - 97.0 fL    MCH 29.8 26.6 - 33.0 pg    MCHC 32.9 31.5 - 35.7 g/dL    RDW 14.5 12.3 - 15.4 %    RDW-SD 47.8 37.0 - 54.0 fl    MPV 9.5 6.0 - 12.0 fL    Platelets 372 140 - 450 10*3/mm3    Neutrophil % 65.3 42.7 - 76.0 %    Lymphocyte % 20.6 19.6 - 45.3 %    Monocyte % 10.2 5.0 - 12.0 %    Eosinophil % 2.8 0.3 - 6.2 %    Basophil % 0.7 0.0 - 1.5 %    Immature Grans % 0.4 0.0 - 0.5 %    Neutrophils, Absolute 4.50 1.40 - 7.00 10*3/mm3    Lymphocytes, Absolute 1.42 0.70 - 3.10 10*3/mm3    Monocytes, Absolute 0.70 0.10 - 0.90 10*3/mm3    Eosinophils, Absolute 0.19 0.00 - 0.40 10*3/mm3    Basophils, Absolute 0.05 0.00 - 0.20 10*3/mm3    Immature Grans, Absolute 0.03 0.00 - 0.05  10*3/mm3    nRBC 0.0 0.0 - 0.0 /100 WBC   Light Blue Top   Result Value Ref Range    Extra Tube hold for add-on    Green Top (Gel)   Result Value Ref Range    Extra Tube Hold for add-ons.    Lavender Top   Result Value Ref Range    Extra Tube hold for add-on    Gold Top - SST   Result Value Ref Range    Extra Tube Hold for add-ons.             Assessment:     Diagnoses and all orders for this visit:    1. Osteogenesis imperfecta type III (Primary)  -     Miscellaneous DME    2. Chronic subluxation of right hip, subsequent encounter  -     Ambulatory Referral to Orthopedic Surgery  -     meloxicam (Mobic) 7.5 MG tablet; Take 1 tablet by mouth Daily.  Dispense: 30 tablet; Refill: 1        Plan:   Patient is given referrals for orthopedics and to [place] for definitive evaluation.  Otherwise continue current regimen recheck 1 month or as needed we will initiate trial of meloxicam 7.5 mg daily patient will continue to use topical analgesics such as diclofenac gel  I have seen and examined the patient.  I have reviewed the notes, assessments, and/or procedures performed by Naila Su MD, I concur with her/his documentation and assessment and plan for Maria Guadalupe Gibson.            This document has been electronically signed by Kevon Reed MD on November 20, 2020 10:09 CST

## 2020-11-30 ENCOUNTER — TELEPHONE (OUTPATIENT)
Dept: FAMILY MEDICINE CLINIC | Facility: CLINIC | Age: 45
End: 2020-11-30

## 2020-11-30 DIAGNOSIS — S73.001D CHRONIC SUBLUXATION OF RIGHT HIP, SUBSEQUENT ENCOUNTER: Primary | ICD-10-CM

## 2020-11-30 NOTE — PROGRESS NOTES
Patient to follow up in [place]. Requesting CT lower ext ordered for specialist in [place]. CT that specialist requested ordered. Phoned patient and left message on her phone to let her know it was ordered.    Naila Su MD PGY-2

## 2020-12-12 ENCOUNTER — HOSPITAL ENCOUNTER (EMERGENCY)
Facility: HOSPITAL | Age: 45
Discharge: HOME | End: 2020-12-13
Attending: EMERGENCY MEDICINE | Admitting: EMERGENCY MEDICINE

## 2020-12-12 ENCOUNTER — APPOINTMENT (OUTPATIENT)
Dept: CT IMAGING | Facility: HOSPITAL | Age: 45
End: 2020-12-12

## 2020-12-12 DIAGNOSIS — R59.0 RETROPERITONEAL LYMPHADENOPATHY: ICD-10-CM

## 2020-12-12 DIAGNOSIS — M54.32 SCIATICA OF LEFT SIDE: Primary | ICD-10-CM

## 2020-12-12 DIAGNOSIS — N39.0 ACUTE UTI: ICD-10-CM

## 2020-12-12 LAB

## 2020-12-12 PROCEDURE — 81001 URINALYSIS AUTO W/SCOPE: CPT

## 2020-12-12 PROCEDURE — 72131 CT LUMBAR SPINE W/O DYE: CPT

## 2020-12-12 PROCEDURE — 25010000002 KETOROLAC TROMETHAMINE PER 15 MG: Performed by: EMERGENCY MEDICINE

## 2020-12-12 PROCEDURE — 99283 EMERGENCY DEPT VISIT LOW MDM: CPT

## 2020-12-12 PROCEDURE — 96372 THER/PROPH/DIAG INJ SC/IM: CPT

## 2020-12-12 RX ORDER — NITROFURANTOIN 25; 75 MG/1; MG/1
100 CAPSULE ORAL ONCE
Status: COMPLETED | OUTPATIENT
Start: 2020-12-12 | End: 2020-12-12

## 2020-12-12 RX ORDER — KETOROLAC TROMETHAMINE 30 MG/ML
30 INJECTION, SOLUTION INTRAMUSCULAR; INTRAVENOUS ONCE
Status: COMPLETED | OUTPATIENT
Start: 2020-12-12 | End: 2020-12-12

## 2020-12-12 RX ADMIN — NITROFURANTOIN MONOHYDRATE/MACROCRYSTALLINE 100 MG: 25; 75 CAPSULE ORAL at 22:58

## 2020-12-12 RX ADMIN — KETOROLAC TROMETHAMINE 30 MG: 30 INJECTION, SOLUTION INTRAMUSCULAR; INTRAVENOUS at 23:00

## 2020-12-13 ENCOUNTER — APPOINTMENT (OUTPATIENT)
Dept: CT IMAGING | Facility: HOSPITAL | Age: 45
End: 2020-12-13

## 2020-12-13 VITALS
RESPIRATION RATE: 17 BRPM | WEIGHT: 111 LBS | HEART RATE: 75 BPM | BODY MASS INDEX: 25.69 KG/M2 | TEMPERATURE: 97.9 F | SYSTOLIC BLOOD PRESSURE: 133 MMHG | OXYGEN SATURATION: 99 % | DIASTOLIC BLOOD PRESSURE: 92 MMHG | HEIGHT: 55 IN

## 2020-12-13 LAB
ALBUMIN SERPL-MCNC: 4 G/DL (ref 3.5–5.2)
ALBUMIN/GLOB SERPL: 1.4 G/DL
ALP SERPL-CCNC: 65 U/L (ref 39–117)
ALT SERPL W P-5'-P-CCNC: 9 U/L (ref 1–33)
ANION GAP SERPL CALCULATED.3IONS-SCNC: 9 MMOL/L (ref 5–15)
AST SERPL-CCNC: 15 U/L (ref 1–32)
BASOPHILS # BLD AUTO: 0.05 10*3/MM3 (ref 0–0.2)
BASOPHILS NFR BLD AUTO: 0.6 % (ref 0–1.5)
BILIRUB SERPL-MCNC: 0.3 MG/DL (ref 0–1.2)
BUN SERPL-MCNC: 9 MG/DL (ref 6–20)
BUN/CREAT SERPL: 19.1 (ref 7–25)
CALCIUM SPEC-SCNC: 9.1 MG/DL (ref 8.6–10.5)
CHLORIDE SERPL-SCNC: 107 MMOL/L (ref 98–107)
CO2 SERPL-SCNC: 21 MMOL/L (ref 22–29)
CREAT SERPL-MCNC: 0.47 MG/DL (ref 0.57–1)
DEPRECATED RDW RBC AUTO: 46.2 FL (ref 37–54)
EOSINOPHIL # BLD AUTO: 0.4 10*3/MM3 (ref 0–0.4)
EOSINOPHIL NFR BLD AUTO: 4.8 % (ref 0.3–6.2)
ERYTHROCYTE [DISTWIDTH] IN BLOOD BY AUTOMATED COUNT: 13.4 % (ref 12.3–15.4)
GFR SERPL CREATININE-BSD FRML MDRD: 144 ML/MIN/1.73
GLOBULIN UR ELPH-MCNC: 2.9 GM/DL
GLUCOSE SERPL-MCNC: 117 MG/DL (ref 65–99)
HCT VFR BLD AUTO: 41.7 % (ref 34–46.6)
HGB BLD-MCNC: 14.1 G/DL (ref 12–15.9)
HOLD SPECIMEN: NORMAL
IMM GRANULOCYTES # BLD AUTO: 0.03 10*3/MM3 (ref 0–0.05)
IMM GRANULOCYTES NFR BLD AUTO: 0.4 % (ref 0–0.5)
LYMPHOCYTES # BLD AUTO: 1.7 10*3/MM3 (ref 0.7–3.1)
LYMPHOCYTES NFR BLD AUTO: 20.2 % (ref 19.6–45.3)
MCH RBC QN AUTO: 31.9 PG (ref 26.6–33)
MCHC RBC AUTO-ENTMCNC: 33.8 G/DL (ref 31.5–35.7)
MCV RBC AUTO: 94.3 FL (ref 79–97)
MONOCYTES # BLD AUTO: 0.88 10*3/MM3 (ref 0.1–0.9)
MONOCYTES NFR BLD AUTO: 10.5 % (ref 5–12)
NEUTROPHILS NFR BLD AUTO: 5.35 10*3/MM3 (ref 1.7–7)
NEUTROPHILS NFR BLD AUTO: 63.5 % (ref 42.7–76)
NRBC BLD AUTO-RTO: 0 /100 WBC (ref 0–0.2)
PLATELET # BLD AUTO: 229 10*3/MM3 (ref 140–450)
PMV BLD AUTO: 9.8 FL (ref 6–12)
POTASSIUM SERPL-SCNC: 3.5 MMOL/L (ref 3.5–5.2)
PROT SERPL-MCNC: 6.9 G/DL (ref 6–8.5)
RBC # BLD AUTO: 4.42 10*6/MM3 (ref 3.77–5.28)
SODIUM SERPL-SCNC: 137 MMOL/L (ref 136–145)
WBC # BLD AUTO: 8.41 10*3/MM3 (ref 3.4–10.8)
WHOLE BLOOD HOLD SPECIMEN: NORMAL

## 2020-12-13 PROCEDURE — 80053 COMPREHEN METABOLIC PANEL: CPT | Performed by: EMERGENCY MEDICINE

## 2020-12-13 PROCEDURE — 74177 CT ABD & PELVIS W/CONTRAST: CPT

## 2020-12-13 PROCEDURE — 96374 THER/PROPH/DIAG INJ IV PUSH: CPT

## 2020-12-13 PROCEDURE — 85025 COMPLETE CBC W/AUTO DIFF WBC: CPT | Performed by: EMERGENCY MEDICINE

## 2020-12-13 PROCEDURE — 25010000002 IOPAMIDOL 61 % SOLUTION: Performed by: EMERGENCY MEDICINE

## 2020-12-13 PROCEDURE — 25010000002 DEXAMETHASONE PER 1 MG: Performed by: EMERGENCY MEDICINE

## 2020-12-13 RX ORDER — DEXAMETHASONE SODIUM PHOSPHATE 4 MG/ML
6 INJECTION, SOLUTION INTRA-ARTICULAR; INTRALESIONAL; INTRAMUSCULAR; INTRAVENOUS; SOFT TISSUE ONCE
Status: COMPLETED | OUTPATIENT
Start: 2020-12-13 | End: 2020-12-13

## 2020-12-13 RX ORDER — NITROFURANTOIN 25; 75 MG/1; MG/1
100 CAPSULE ORAL 2 TIMES DAILY
Qty: 14 CAPSULE | Refills: 0 | Status: SHIPPED | OUTPATIENT
Start: 2020-12-13 | End: 2020-12-20

## 2020-12-13 RX ORDER — SODIUM CHLORIDE 0.9 % (FLUSH) 0.9 %
10 SYRINGE (ML) INJECTION AS NEEDED
Status: DISCONTINUED | OUTPATIENT
Start: 2020-12-13 | End: 2020-12-13 | Stop reason: HOSPADM

## 2020-12-13 RX ADMIN — IOPAMIDOL 75 ML: 612 INJECTION, SOLUTION INTRAVENOUS at 01:34

## 2020-12-13 RX ADMIN — SODIUM CHLORIDE 500 ML: 9 INJECTION, SOLUTION INTRAVENOUS at 00:45

## 2020-12-13 RX ADMIN — DEXAMETHASONE SODIUM PHOSPHATE 6 MG: 4 INJECTION, SOLUTION INTRAMUSCULAR; INTRAVENOUS at 02:16

## 2020-12-13 NOTE — DISCHARGE INSTRUCTIONS
As we discussed, follow-up with your primary care physician regarding the retroperitoneal lymphadenopathy seen on your CT scan.

## 2020-12-13 NOTE — ED PROVIDER NOTES
Subjective   45-year-old white female with a history of osteogenesis imperfecta with dwarfism presents to the emergency department chief complaint of back pain.  Patient complains of left lower back pain that radiates down her left leg for couple days.  Patient denies injury or trauma.  Patient is nonambulatory at her baseline.  Patient denies fever, sweats, chills, numbness, weakness, or loss of bowel or bladder control.          Review of Systems   Constitutional: Negative for chills, diaphoresis and fever.   Respiratory: Negative for cough and shortness of breath.    Cardiovascular: Negative for chest pain.   Gastrointestinal: Negative for abdominal pain, nausea and vomiting.   Genitourinary: Negative for dysuria and hematuria.   Musculoskeletal: Positive for back pain. Negative for neck pain.   Neurological: Negative for syncope, weakness, numbness and headaches.   All other systems reviewed and are negative.      Past Medical History:   Diagnosis Date   • Agoraphobia with panic attacks    • Benign paroxysmal positional vertigo    • Chronic pain    • Depression    • Depressive disorder    • External hemorrhoids without complication     excised   • Internal hemorrhoids     with prolapse      • Osteogenesis imperfecta type III     with dwarfism, non union R femur,Uses a wheelchair          Allergies   Allergen Reactions   • Latex Anaphylaxis and Angioedema   • Codeine Hives       Past Surgical History:   Procedure Laterality Date   • COLONOSCOPY  11/15/2010    Colitis found in the entire colon. Biopsy taken. Normal terminal ileum. Biopsy taken   • ELBOW PROCEDURE Right    • FEMUR SURGERY Bilateral    • HEMORROIDECTOMY  09/17/2013    With excision of a single internal and external hemorrhoid.   • HERNIA REPAIR  09/21/2011    Large ventral hernia. Open ventral hernioplasty with mesh   • LUMBAR DISC SURGERY     • SPINE SURGERY Bilateral    • UPPER GASTROINTESTINAL ENDOSCOPY  11/15/2010    Normal esophagus.Gastritis  found in the stomach. Biopsy taken. Normal duodenum. Biopsy taken       Family History   Problem Relation Age of Onset   • Lymphoma Brother        Social History     Socioeconomic History   • Marital status: Single     Spouse name: Not on file   • Number of children: Not on file   • Years of education: Not on file   • Highest education level: Not on file   Tobacco Use   • Smoking status: Current Every Day Smoker     Packs/day: 0.25   • Smokeless tobacco: Never Used   Substance and Sexual Activity   • Alcohol use: No     Comment: sober for 2.5 years    • Drug use: Yes     Frequency: 2.0 times per week     Types: Marijuana   • Sexual activity: Defer           Objective   Physical Exam  Vitals signs and nursing note reviewed.   Constitutional:       General: She is not in acute distress.     Appearance: She is not diaphoretic.   HENT:      Head: Atraumatic.      Right Ear: External ear normal.      Left Ear: External ear normal.      Nose: Nose normal.      Mouth/Throat:      Mouth: Mucous membranes are moist.      Pharynx: Oropharynx is clear.   Eyes:      Extraocular Movements: Extraocular movements intact.      Conjunctiva/sclera: Conjunctivae normal.      Pupils: Pupils are equal, round, and reactive to light.   Neck:      Musculoskeletal: Normal range of motion and neck supple.   Cardiovascular:      Rate and Rhythm: Normal rate and regular rhythm.      Pulses: Normal pulses.      Heart sounds: Normal heart sounds.   Pulmonary:      Effort: Pulmonary effort is normal.      Breath sounds: Normal breath sounds.   Abdominal:      General: Bowel sounds are normal. There is no distension.      Palpations: Abdomen is soft. There is no mass.      Tenderness: There is no abdominal tenderness. There is no guarding.   Musculoskeletal:      Right lower leg: No edema.      Left lower leg: No edema.      Comments: No tenderness of the cervical, thoracic, or lumbar spine.  There is tenderness of the left lower back muscles.    Skin:     General: Skin is warm and dry.   Neurological:      Mental Status: She is alert and oriented to person, place, and time.      Sensory: No sensory deficit.      Motor: No weakness.   Psychiatric:         Mood and Affect: Mood normal.         Behavior: Behavior normal.         Procedures           ED Course  ED Course as of Dec 13 0213   Sun Dec 13, 2020   0209 Patient is alert and resting comfortably. I reviewed the results of the emergency department evaluation including the retroperitoneal lymphadenopathy seen on CT scan with the patient.  I recommended primary care follow-up.  I advised the patient to return to the emergency department if their symptoms change or worsen.     [DR]      ED Course User Index  [DR] Alex Mendez MD            Labs Reviewed   URINALYSIS W/ MICROSCOPIC IF INDICATED (NO CULTURE) - Abnormal; Notable for the following components:       Result Value    Appearance, UA Cloudy (*)     Leuk Esterase, UA Moderate (2+) (*)     All other components within normal limits   URINALYSIS, MICROSCOPIC ONLY - Abnormal; Notable for the following components:    RBC, UA 0-2 (*)     WBC, UA 31-50 (*)     Bacteria, UA 2+ (*)     Squamous Epithelial Cells, UA 3-5 (*)     All other components within normal limits   COMPREHENSIVE METABOLIC PANEL - Abnormal; Notable for the following components:    Glucose 117 (*)     Creatinine 0.47 (*)     CO2 21.0 (*)     All other components within normal limits    Narrative:     GFR Normal >60  Chronic Kidney Disease <60  Kidney Failure <15     CBC WITH AUTO DIFFERENTIAL - Normal   CBC AND DIFFERENTIAL    Narrative:     The following orders were created for panel order CBC & Differential.  Procedure                               Abnormality         Status                     ---------                               -----------         ------                     CBC Auto Differential[[id number]]        Normal              Final result                 Please view  results for these tests on the individual orders.   EXTRA TUBES    Narrative:     The following orders were created for panel order Extra Tubes.  Procedure                               Abnormality         Status                     ---------                               -----------         ------                     Light Blue Top[[id number]]                                   Final result               Gold Top - [facility][[id number]]                                   Final result                 Please view results for these tests on the individual orders.   LIGHT BLUE TOP   [facility]     Ct Lumbar Spine Without Contrast    Result Date: 12/13/2020  Narrative: HISTORY:  Lower back pain radiates down her left leg PROCEDURE: CT LUMBAR SPINE WITHOUT IV CONTRAST TECHNIQUE: Noncontrast axial, coronal, and sagittal of the lumbar spine were obtained. This exam was performed according to our departmental dose-optimization program, which includes automated exposure control, adjustment of the mA and/or kV according to patient size and/or use of iterative reconstruction technique. COMPARISON:  None INTERPRETATION: Thoracolumbar fusion rods and wires are identified. The superior extent of the hardware is not included on this exam. There is no definite lumbar spine fracture or subluxation. Bilateral femoral hardware is also partially visualized. On the right, there is partial visualization of an intramedullary josé miguel within the femur. The proximal portion of the josé miguel extends above the level of the greater trochanter by at least 4 cm. There is also extensive lucency surrounding the visualized portions of the right femoral josé miguel and there is cortical discontinuity of the visualized proximal femoral diaphysis, which is nearly circumferential on the final few axial images. There is also partial visualization of left femoral rods and screws. The left femoral neck screw is only partially visualized on the CT images and appears intact as  visualized. Remaining hardware is partially visualized on the  topogram. There is a chronic ununited fracture of the left superior medial acetabulum and there is chronic appearing erosion of the left posterior acetabulum. There is chronic appearing deformity of the bilateral iliac bones and sacrum. There is asymmetric soft tissue thickening along the left retroperitoneum beginning at the false pelvis and extending to the mid acetabular level. There is also nodular soft tissue attenuation adjacent to the left sacroiliac joint and extending to the central presacral region. There is an incidental, 2 cm right renal cyst. Urinary bladder is at least moderately distended. There are a few scattered arterial calcifications. There is a small hiatal hernia.     Impression: 1. Rather extensive postoperative changes are noted about the thoracolumbar spine. There is no definite lumbar spine fracture or subluxation. 2. There is nonspecific left retroperitoneal soft tissue thickening and additional nodular areas of thickening adjacent to the left sacroiliac joint and presacral regions. Hemorrhage, infection, and neoplasm are the main considerations. Are there any comparison studies? Contrast-enhanced CT abdomen and pelvis might provide additional characterization. 3. Extensive right femoral cortical thinning and lucency surrounding right femoral intramedullary josé miguel. Infection should be excluded. There is also hardware within the left femur, however this is not fully evaluated. Chronic appearing deformities are also noted about the pelvis and sacrum. Electronically signed by:  Joaquin Cortez MD  12/13/2020 12:11 AM [facility] Workstation: [id number]    Ct Abdomen Pelvis With Contrast    Result Date: 12/13/2020  Narrative: EXAM DESCRIPTION: CT ABDOMEN PELVIS W CONTRAST RadLex: CT ABDOMEN PELVIS WITH IV CONTRAST CLINICAL HISTORY: 45 years  Female;  left lower back pain, left retroperitoneal abnormality seen on CT of lumbar spine  TECHNOLOGIST NOTES: TECHNIQUE: Helical CT imaging was obtained of the abdomen and pelvis with multiplanar reconstructions. This exam was performed according to our departmental dose-optimization program, which includes automated exposure control, adjustment of the mA and/or kV according to patient size and/or use of iterative reconstruction techniques. COMPARISON: None currently available FINDINGS: Abdomen: The visualized lung bases are unremarkable. There is no focal consolidation. The liver, spleen, pancreas, adrenal glands and kidneys show no acute abnormality. No evidence of acute pancreatitis.    There are no calcified gallstones. There is no gallbladder wall thickening. No pericholecystic fluid.  There is no gross biliary duct dilatation.  No significant hydronephrosis bilaterally.  There is a right renal cyst.   No free air.    There is no significant free fluid.    There is no significant lymph node enlargement. There is no significant aneurysmal enlargement of the abdominal aorta. Pelvis: There is no evidence of bowel obstruction.    No herniated bowel loops.  . No focal inflammatory changes . Appendix is unremarkable.  Evaluation of the bowel is limited when there is no oral contrast or when the bowel is incompletely opacified with enteric contrast.. There is no significant free fluid in the pelvis. The bladder is grossly unremarkable.  The uterus is present. Scoliosis with paraspinal rods. Severe, chronic dysplastic changes of the pelvis and the hips bilaterally. Associated asymmetry of the retroperitoneal and pelvic musculature. There appear to be several soft tissue nodules in the left retroperitoneum adjacent to the iliopsoas. The largest is 1.4 x 0.9 cm. Possible adenopathy but the appearance is nonspecific. Comparison to any available older studies recommended. If none can be located, follow-up CT suggested. There is a strong clinical suspicion, consider MRI or possibly CT PET. Postsurgical changes  in the visualized proximal femur bilaterally. There is a left dynamic hip screw. Intramedullary josé miguel in the right femur. Extensive lucency in the proximal right femur around the hardware. Possible residual changes from prior injury but correlate clinically.     Impression: 1.  No evidence of an acute process. 2.  No bowel obstruction, herniated bowel loops or focal inflammatory changes. 3.  Several soft tissue nodules in the left retroperitoneum, possibly mildly enlarged nodes. See above comments. Electronically signed by:  Jesus Altman MD  12/13/2020 2:01 AM [facility] Workstation: [id number]                                    [facility]    Final diagnoses:   Sciatica of left side   Acute UTI   Retroperitoneal lymphadenopathy            Alex Mendez MD  12/13/20 1087

## 2020-12-14 ENCOUNTER — TELEPHONE (OUTPATIENT)
Dept: FAMILY MEDICINE CLINIC | Facility: CLINIC | Age: 45
End: 2020-12-14

## 2020-12-15 ENCOUNTER — LAB (OUTPATIENT)
Dept: LAB | Facility: HOSPITAL | Age: 45
End: 2020-12-15

## 2020-12-15 ENCOUNTER — TRANSCRIBE ORDERS (OUTPATIENT)
Dept: LAB | Facility: HOSPITAL | Age: 45
End: 2020-12-15

## 2020-12-15 ENCOUNTER — HOSPITAL ENCOUNTER (EMERGENCY)
Facility: HOSPITAL | Age: 45
Discharge: HOME | End: 2020-12-15
Attending: EMERGENCY MEDICINE | Admitting: EMERGENCY MEDICINE

## 2020-12-15 ENCOUNTER — OFFICE VISIT (OUTPATIENT)
Dept: FAMILY MEDICINE CLINIC | Facility: CLINIC | Age: 45
End: 2020-12-15

## 2020-12-15 ENCOUNTER — APPOINTMENT (OUTPATIENT)
Dept: ULTRASOUND IMAGING | Facility: HOSPITAL | Age: 45
End: 2020-12-15

## 2020-12-15 VITALS
WEIGHT: 111 LBS | OXYGEN SATURATION: 98 % | TEMPERATURE: 98 F | DIASTOLIC BLOOD PRESSURE: 68 MMHG | HEART RATE: 74 BPM | HEIGHT: 55 IN | SYSTOLIC BLOOD PRESSURE: 116 MMHG | BODY MASS INDEX: 25.69 KG/M2

## 2020-12-15 VITALS
DIASTOLIC BLOOD PRESSURE: 84 MMHG | SYSTOLIC BLOOD PRESSURE: 125 MMHG | WEIGHT: 111 LBS | TEMPERATURE: 97.8 F | HEART RATE: 92 BPM | RESPIRATION RATE: 20 BRPM | BODY MASS INDEX: 35.33 KG/M2 | OXYGEN SATURATION: 99 %

## 2020-12-15 DIAGNOSIS — I82.412 ACUTE DEEP VEIN THROMBOSIS (DVT) OF FEMORAL VEIN OF LEFT LOWER EXTREMITY (HCC): Primary | ICD-10-CM

## 2020-12-15 DIAGNOSIS — Q78.0 OSTEOGENESIS IMPERFECTA TYPE III: Primary | ICD-10-CM

## 2020-12-15 DIAGNOSIS — M54.32 LEFT SIDED SCIATICA: Primary | ICD-10-CM

## 2020-12-15 PROCEDURE — 93971 EXTREMITY STUDY: CPT

## 2020-12-15 PROCEDURE — 85025 COMPLETE CBC W/AUTO DIFF WBC: CPT | Performed by: STUDENT IN AN ORGANIZED HEALTH CARE EDUCATION/TRAINING PROGRAM

## 2020-12-15 PROCEDURE — 80053 COMPREHEN METABOLIC PANEL: CPT | Performed by: STUDENT IN AN ORGANIZED HEALTH CARE EDUCATION/TRAINING PROGRAM

## 2020-12-15 PROCEDURE — 99213 OFFICE O/P EST LOW 20 MIN: CPT | Performed by: STUDENT IN AN ORGANIZED HEALTH CARE EDUCATION/TRAINING PROGRAM

## 2020-12-15 PROCEDURE — 36415 COLL VENOUS BLD VENIPUNCTURE: CPT | Performed by: STUDENT IN AN ORGANIZED HEALTH CARE EDUCATION/TRAINING PROGRAM

## 2020-12-15 PROCEDURE — 85652 RBC SED RATE AUTOMATED: CPT | Performed by: STUDENT IN AN ORGANIZED HEALTH CARE EDUCATION/TRAINING PROGRAM

## 2020-12-15 PROCEDURE — 99283 EMERGENCY DEPT VISIT LOW MDM: CPT

## 2020-12-15 PROCEDURE — 86140 C-REACTIVE PROTEIN: CPT | Performed by: STUDENT IN AN ORGANIZED HEALTH CARE EDUCATION/TRAINING PROGRAM

## 2020-12-15 RX ORDER — PREDNISONE 20 MG/1
20 TABLET ORAL DAILY
Qty: 5 TABLET | Refills: 0 | Status: SHIPPED | OUTPATIENT
Start: 2020-12-15 | End: 2021-06-09

## 2020-12-15 RX ORDER — MELOXICAM 15 MG/1
15 TABLET ORAL DAILY
Qty: 30 TABLET | Refills: 0 | OUTPATIENT
Start: 2020-12-15 | End: 2020-12-15

## 2020-12-15 RX ORDER — MELOXICAM 7.5 MG/1
7.5 TABLET ORAL DAILY
COMMUNITY
Start: 2020-11-19 | End: 2020-12-15

## 2020-12-15 RX ADMIN — APIXABAN 10 MG: 5 TABLET, FILM COATED ORAL at 20:09

## 2020-12-15 NOTE — PROGRESS NOTES
Subjective   Maria Guadalupe Gibson is a 45 y.o. female who presents for hospital follow up for left sided sciatica. Pt states she was given a Toradol shot in the ED 5 days ago that provided only minimal relief. She states pain is currently 6/10 and shoots doewn the left leg. She denies any bowel/bladder incontinence. States she has a similar issue on the right side that she is seeing a specialist in [place] for this Thursday 12/17.       Past Medical Hx:  Past Medical History:   Diagnosis Date   • Agoraphobia with panic attacks    • Benign paroxysmal positional vertigo    • Chronic pain    • Depression    • Depressive disorder    • External hemorrhoids without complication     excised   • Internal hemorrhoids     with prolapse      • Osteogenesis imperfecta type III     with dwarfism, non union R femur,Uses a wheelchair          Past Surgical Hx:  Past Surgical History:   Procedure Laterality Date   • COLONOSCOPY  11/15/2010    Colitis found in the entire colon. Biopsy taken. Normal terminal ileum. Biopsy taken   • ELBOW PROCEDURE Right    • FEMUR SURGERY Bilateral    • HEMORROIDECTOMY  09/17/2013    With excision of a single internal and external hemorrhoid.   • HERNIA REPAIR  09/21/2011    Large ventral hernia. Open ventral hernioplasty with mesh   • LUMBAR DISC SURGERY     • SPINE SURGERY Bilateral    • UPPER GASTROINTESTINAL ENDOSCOPY  11/15/2010    Normal esophagus.Gastritis found in the stomach. Biopsy taken. Normal duodenum. Biopsy taken       Health Maintenance:  Health Maintenance   Topic Date Due   • Pneumococcal Vaccine 0-64 (1 of 1 - PPSV23) 01/09/1982   • HEPATITIS C SCREENING  08/09/2017   • ANNUAL WELLNESS VISIT  08/09/2017   • INFLUENZA VACCINE  11/19/2021 (Originally 8/1/2020)   • PAP SMEAR  02/06/2021   • TDAP/TD VACCINES (2 - Td) 06/06/2026       Current Meds:    Current Outpatient Medications:   •  diclofenac (VOLTAREN) 1 % gel gel, Apply 4 g topically to the appropriate area  as directed 4 (Four) Times a Day As Needed (pain)., Disp: 100 g, Rfl: 2  •  Elastic Bandages & Supports (WRIST SPLINT/LEFT SMALL) misc, 1 each Daily., Disp: 1 each, Rfl: 0  •  Elastic Bandages & Supports (WRIST SPLINT/RIGHT SMALL) misc, 1 each Daily., Disp: 1 each, Rfl: 0  •  ibuprofen (ADVIL,MOTRIN) 400 MG tablet, Take 1 tablet by mouth Every 6 (Six) Hours As Needed for Mild Pain ., Disp: 90 tablet, Rfl: 2  •  nitrofurantoin, macrocrystal-monohydrate, (MACROBID) 100 MG capsule, Take 1 capsule by mouth 2 (Two) Times a Day for 7 days., Disp: 14 capsule, Rfl: 0  •  SUMAtriptan (IMITREX) 50 MG tablet, Take 1 tablet by mouth As Needed for Migraine. Take one tablet at onset of headache. May repeat dose one time in 2 hours if headache not relieved., Disp: 30 tablet, Rfl: 0  •  meloxicam (Mobic) 15 MG tablet, Take 1 tablet by mouth Daily., Disp: 30 tablet, Rfl: 0  •  predniSONE (DELTASONE) 20 MG tablet, Take 1 tablet by mouth Daily., Disp: 5 tablet, Rfl: 0    Allergies:  Latex and Codeine    Family Hx:  Family History   Problem Relation Age of Onset   • Lymphoma Brother         Social History:  Social History     Socioeconomic History   • Marital status: Single     Spouse name: Not on file   • Number of children: Not on file   • Years of education: Not on file   • Highest education level: Not on file   Tobacco Use   • Smoking status: Current Every Day Smoker     Packs/day: 0.25   • Smokeless tobacco: Never Used   Substance and Sexual Activity   • Alcohol use: No     Comment: sober for 2.5 years    • Drug use: Yes     Frequency: 2.0 times per week     Types: Marijuana   • Sexual activity: Defer       Review of Systems  Review of Systems   Constitutional: Negative for activity change, chills, diaphoresis and fever.   HENT: Negative for dental problem, drooling, ear discharge, ear pain, facial swelling, mouth sores, nosebleeds, rhinorrhea, sinus pressure, sinus pain, sneezing and sore throat.    Eyes: Negative for pain,  "discharge and visual disturbance.   Respiratory: Negative for apnea, cough, shortness of breath, wheezing and stridor.    Cardiovascular: Negative for chest pain, palpitations and leg swelling.   Gastrointestinal: Negative for abdominal distention, abdominal pain, constipation, diarrhea, nausea and vomiting.   Genitourinary: Negative for dysuria, frequency and urgency.   Musculoskeletal: Negative for arthralgias and back pain.   Skin: Negative for rash and wound.   Neurological: Positive for numbness. Negative for dizziness, facial asymmetry, light-headedness and headaches.   Psychiatric/Behavioral: Negative for agitation and decreased concentration. The patient is not nervous/anxious.             Objective:     /68   Pulse 74   Temp 98 °F (36.7 °C)   Ht 119.4 cm (47\")   Wt 50.3 kg (111 lb)   LMP 12/01/2020 (Exact Date)   SpO2 98%   BMI 35.33 kg/m²       Physical Exam  Constitutional:       Appearance: She is well-developed.   HENT:      Head: Normocephalic and atraumatic.      Right Ear: External ear normal.      Left Ear: External ear normal.      Nose: Nose normal.      Mouth/Throat:      Pharynx: No oropharyngeal exudate.   Eyes:      General: No scleral icterus.        Right eye: No discharge.         Left eye: No discharge.      Conjunctiva/sclera: Conjunctivae normal.      Pupils: Pupils are equal, round, and reactive to light.   Neck:      Musculoskeletal: Normal range of motion and neck supple.      Vascular: No JVD.   Cardiovascular:      Rate and Rhythm: Normal rate and regular rhythm.      Heart sounds: Normal heart sounds. No murmur. No friction rub. No gallop.    Pulmonary:      Effort: Pulmonary effort is normal. No respiratory distress.      Breath sounds: Normal breath sounds. No stridor. No wheezing or rales.   Abdominal:      General: Bowel sounds are normal. There is no distension.      Palpations: Abdomen is soft.      Tenderness: There is no abdominal tenderness.   Musculoskeletal: " Normal range of motion.         General: No tenderness or deformity.   Skin:     General: Skin is warm and dry.      Capillary Refill: Capillary refill takes less than 2 seconds.      Coloration: Skin is not pale.      Findings: No erythema or rash.   Neurological:      Mental Status: She is alert and oriented to person, place, and time.      Sensory: Sensory deficit present.      Motor: Weakness present.   Psychiatric:         Behavior: Behavior normal.         Assessment/Plan:     Diagnoses and all orders for this visit:    1. Left sided sciatica (Primary)  -     meloxicam (Mobic) 15 MG tablet; Take 1 tablet by mouth Daily.  Dispense: 30 tablet; Refill: 0  -     predniSONE (DELTASONE) 20 MG tablet; Take 1 tablet by mouth Daily.  Dispense: 5 tablet; Refill: 0     Follow up in 7 days to assess for treatment response. May consider nerve block in future if symptoms do not improve.    Follow-up:     Return in about 1 week (around 12/22/2020).    Goals     • stay healthy (pt-stated)      Barriers to goals: none             Preventative:    Vaccines Recommended at this visit:   No Vaccines recommended today. Patient is up to date on all vaccines.     Vaccines Received at this visit:  No Vaccines recommended today. Patient is up to date on all vaccines.     Screenings Recommended at this visit:  No Screenings offered today. Patient is up to date on all screenings at this time.     Screenings Ordered at this visit:  No screenings were offered today. Patient is up to date on all screenings.     Smoking Status:  Patient is current smoker. Patient is not interested in smoking cessation.    Alcohol Intake:  Patient does not drink    Patient's Body mass index is 35.33 kg/m². BMI is above normal parameters. Recommendations include: exercise counseling and nutrition counseling.         RISK SCORE: 3          This document has been electronically signed by Herve Quinonez MD on December 15, 2020 11:39 CST

## 2020-12-16 ENCOUNTER — TELEPHONE (OUTPATIENT)
Dept: FAMILY MEDICINE CLINIC | Facility: CLINIC | Age: 45
End: 2020-12-16

## 2020-12-16 LAB
ALBUMIN SERPL-MCNC: 4.6 G/DL (ref 3.5–5.2)
ALBUMIN/GLOB SERPL: 1.3 G/DL
ALP SERPL-CCNC: 78 U/L (ref 39–117)
ALT SERPL W P-5'-P-CCNC: 11 U/L (ref 1–33)
ANION GAP SERPL CALCULATED.3IONS-SCNC: 14 MMOL/L (ref 5–15)
AST SERPL-CCNC: 15 U/L (ref 1–32)
BASOPHILS # BLD AUTO: 0.05 10*3/MM3 (ref 0–0.2)
BASOPHILS NFR BLD AUTO: 0.5 % (ref 0–1.5)
BILIRUB SERPL-MCNC: 0.4 MG/DL (ref 0–1.2)
BUN SERPL-MCNC: 9 MG/DL (ref 6–20)
BUN/CREAT SERPL: 20.9 (ref 7–25)
CALCIUM SPEC-SCNC: 9.5 MG/DL (ref 8.6–10.5)
CHLORIDE SERPL-SCNC: 105 MMOL/L (ref 98–107)
CO2 SERPL-SCNC: 21 MMOL/L (ref 22–29)
CREAT SERPL-MCNC: 0.43 MG/DL (ref 0.57–1)
CRP SERPL-MCNC: 3.15 MG/DL (ref 0–0.5)
DEPRECATED RDW RBC AUTO: 45.3 FL (ref 37–54)
EOSINOPHIL # BLD AUTO: 0.08 10*3/MM3 (ref 0–0.4)
EOSINOPHIL NFR BLD AUTO: 0.7 % (ref 0.3–6.2)
ERYTHROCYTE [DISTWIDTH] IN BLOOD BY AUTOMATED COUNT: 13.2 % (ref 12.3–15.4)
ERYTHROCYTE [SEDIMENTATION RATE] IN BLOOD: 12 MM/HR (ref 0–20)
GFR SERPL CREATININE-BSD FRML MDRD: >150 ML/MIN/1.73
GLOBULIN UR ELPH-MCNC: 3.6 GM/DL
GLUCOSE SERPL-MCNC: 83 MG/DL (ref 65–99)
HCT VFR BLD AUTO: 43.7 % (ref 34–46.6)
HGB BLD-MCNC: 14.8 G/DL (ref 12–15.9)
IMM GRANULOCYTES # BLD AUTO: 0.05 10*3/MM3 (ref 0–0.05)
IMM GRANULOCYTES NFR BLD AUTO: 0.5 % (ref 0–0.5)
LYMPHOCYTES # BLD AUTO: 0.75 10*3/MM3 (ref 0.7–3.1)
LYMPHOCYTES NFR BLD AUTO: 6.9 % (ref 19.6–45.3)
MCH RBC QN AUTO: 32 PG (ref 26.6–33)
MCHC RBC AUTO-ENTMCNC: 33.9 G/DL (ref 31.5–35.7)
MCV RBC AUTO: 94.4 FL (ref 79–97)
MONOCYTES # BLD AUTO: 0.3 10*3/MM3 (ref 0.1–0.9)
MONOCYTES NFR BLD AUTO: 2.8 % (ref 5–12)
NEUTROPHILS NFR BLD AUTO: 88.6 % (ref 42.7–76)
NEUTROPHILS NFR BLD AUTO: 9.67 10*3/MM3 (ref 1.7–7)
NRBC BLD AUTO-RTO: 0 /100 WBC (ref 0–0.2)
PLATELET # BLD AUTO: 253 10*3/MM3 (ref 140–450)
PMV BLD AUTO: 10.3 FL (ref 6–12)
POTASSIUM SERPL-SCNC: 4.2 MMOL/L (ref 3.5–5.2)
PROT SERPL-MCNC: 8.2 G/DL (ref 6–8.5)
RBC # BLD AUTO: 4.63 10*6/MM3 (ref 3.77–5.28)
SODIUM SERPL-SCNC: 140 MMOL/L (ref 136–145)
WBC # BLD AUTO: 10.9 10*3/MM3 (ref 3.4–10.8)

## 2020-12-16 NOTE — ED PROVIDER NOTES
Subjective   45-year-old white female with history of osteogenesis imperfecta with dwarfism and nonambulatory at baseline presents to the emergency department chief complaint of leg swelling. Patient complains of left leg swelling for the last couple days. She denies injury or trauma. She denies fever, sweats, chills, numbness, or weakness. Patient was seen here December 12 with sciatica and had follow-up appointment with her primary care physician this morning.          Review of Systems   Constitutional: Negative for chills, diaphoresis and fever.   Respiratory: Negative for cough and shortness of breath.    Cardiovascular: Positive for leg swelling. Negative for chest pain.   Gastrointestinal: Negative for abdominal pain, blood in stool, nausea and vomiting.   Genitourinary: Negative for dysuria and hematuria.   Musculoskeletal: Positive for back pain. Negative for neck pain.   Neurological: Negative for syncope, weakness and headaches.   All other systems reviewed and are negative.      Past Medical History:   Diagnosis Date   • Agoraphobia with panic attacks    • Benign paroxysmal positional vertigo    • Chronic pain    • Depression    • Depressive disorder    • External hemorrhoids without complication     excised   • Internal hemorrhoids     with prolapse      • Osteogenesis imperfecta type III     with dwarfism, non union R femur,Uses a wheelchair          Allergies   Allergen Reactions   • Latex Anaphylaxis and Angioedema   • Codeine Hives       Past Surgical History:   Procedure Laterality Date   • COLONOSCOPY  11/15/2010    Colitis found in the entire colon. Biopsy taken. Normal terminal ileum. Biopsy taken   • ELBOW PROCEDURE Right    • FEMUR SURGERY Bilateral    • HEMORROIDECTOMY  09/17/2013    With excision of a single internal and external hemorrhoid.   • HERNIA REPAIR  09/21/2011    Large ventral hernia. Open ventral hernioplasty with mesh   • LUMBAR DISC SURGERY     • SPINE SURGERY Bilateral    •  UPPER GASTROINTESTINAL ENDOSCOPY  11/15/2010    Normal esophagus.Gastritis found in the stomach. Biopsy taken. Normal duodenum. Biopsy taken       Family History   Problem Relation Age of Onset   • Lymphoma Brother        Social History     Socioeconomic History   • Marital status: Single     Spouse name: Not on file   • Number of children: Not on file   • Years of education: Not on file   • Highest education level: Not on file   Tobacco Use   • Smoking status: Current Every Day Smoker     Packs/day: 0.25   • Smokeless tobacco: Never Used   Substance and Sexual Activity   • Alcohol use: No     Comment: sober for 2.5 years    • Drug use: Yes     Frequency: 2.0 times per week     Types: Marijuana   • Sexual activity: Defer           Objective   Physical Exam  Vitals signs and nursing note reviewed.   Constitutional:       General: She is not in acute distress.     Appearance: She is not diaphoretic.   HENT:      Head: Normocephalic and atraumatic.      Right Ear: External ear normal.      Left Ear: External ear normal.      Nose: Nose normal.      Mouth/Throat:      Mouth: Mucous membranes are moist.      Pharynx: Oropharynx is clear.   Eyes:      Extraocular Movements: Extraocular movements intact.      Conjunctiva/sclera: Conjunctivae normal.      Pupils: Pupils are equal, round, and reactive to light.   Neck:      Musculoskeletal: Normal range of motion and neck supple.   Cardiovascular:      Rate and Rhythm: Normal rate and regular rhythm.      Pulses: Normal pulses.      Heart sounds: Normal heart sounds.   Pulmonary:      Effort: Pulmonary effort is normal.      Breath sounds: Normal breath sounds.   Abdominal:      General: Bowel sounds are normal. There is no distension.      Palpations: Abdomen is soft.      Tenderness: There is no abdominal tenderness. There is no guarding.   Musculoskeletal:      Right lower leg: No edema.      Left lower leg: Edema present.   Skin:     General: Skin is warm and dry.    Neurological:      Mental Status: She is alert and oriented to person, place, and time.      Sensory: No sensory deficit.      Motor: No weakness.   Psychiatric:         Mood and Affect: Mood normal.         Behavior: Behavior normal.         Procedures           ED Course  ED Course as of Dec 15 2040   Tue Dec 15, 2020   1955 Risks and benefits of anticoagulation with Eliquis discussed with the patient.  She gave informed consent to proceed with this medication.    [DR]   2038 Patient is alert and resting comfortably. I reviewed the results of the emergency department evaluation with the patient.  I recommended primary care follow-up.  I advised the patient to return to the emergency department if their symptoms change or worsen.     [DR]      ED Course User Index  [DR] Alex Mendez MD           Labs Reviewed - No data to display  Ct Lumbar Spine Without Contrast    Result Date: 12/13/2020  Narrative: HISTORY:  Lower back pain radiates down her left leg PROCEDURE: CT LUMBAR SPINE WITHOUT IV CONTRAST TECHNIQUE: Noncontrast axial, coronal, and sagittal of the lumbar spine were obtained. This exam was performed according to our departmental dose-optimization program, which includes automated exposure control, adjustment of the mA and/or kV according to patient size and/or use of iterative reconstruction technique. COMPARISON:  None INTERPRETATION: Thoracolumbar fusion rods and wires are identified. The superior extent of the hardware is not included on this exam. There is no definite lumbar spine fracture or subluxation. Bilateral femoral hardware is also partially visualized. On the right, there is partial visualization of an intramedullary josé miguel within the femur. The proximal portion of the josé miguel extends above the level of the greater trochanter by at least 4 cm. There is also extensive lucency surrounding the visualized portions of the right femoral josé miguel and there is cortical discontinuity of the visualized proximal  femoral diaphysis, which is nearly circumferential on the final few axial images. There is also partial visualization of left femoral rods and screws. The left femoral neck screw is only partially visualized on the CT images and appears intact as visualized. Remaining hardware is partially visualized on the  topogram. There is a chronic ununited fracture of the left superior medial acetabulum and there is chronic appearing erosion of the left posterior acetabulum. There is chronic appearing deformity of the bilateral iliac bones and sacrum. There is asymmetric soft tissue thickening along the left retroperitoneum beginning at the false pelvis and extending to the mid acetabular level. There is also nodular soft tissue attenuation adjacent to the left sacroiliac joint and extending to the central presacral region. There is an incidental, 2 cm right renal cyst. Urinary bladder is at least moderately distended. There are a few scattered arterial calcifications. There is a small hiatal hernia.     Impression: 1. Rather extensive postoperative changes are noted about the thoracolumbar spine. There is no definite lumbar spine fracture or subluxation. 2. There is nonspecific left retroperitoneal soft tissue thickening and additional nodular areas of thickening adjacent to the left sacroiliac joint and presacral regions. Hemorrhage, infection, and neoplasm are the main considerations. Are there any comparison studies? Contrast-enhanced CT abdomen and pelvis might provide additional characterization. 3. Extensive right femoral cortical thinning and lucency surrounding right femoral intramedullary josé miguel. Infection should be excluded. There is also hardware within the left femur, however this is not fully evaluated. Chronic appearing deformities are also noted about the pelvis and sacrum. Electronically signed by:  Joaquin Cortez MD  12/13/2020 12:11 AM CST Workstation: [id number]    Ct Abdomen Pelvis With Contrast    Result  Date: 12/13/2020  Narrative: EXAM DESCRIPTION: CT ABDOMEN PELVIS W CONTRAST RadLex: CT ABDOMEN PELVIS WITH IV CONTRAST CLINICAL HISTORY: 45 years  Female;  left lower back pain, left retroperitoneal abnormality seen on CT of lumbar spine TECHNOLOGIST NOTES: TECHNIQUE: Helical CT imaging was obtained of the abdomen and pelvis with multiplanar reconstructions. This exam was performed according to our departmental dose-optimization program, which includes automated exposure control, adjustment of the mA and/or kV according to patient size and/or use of iterative reconstruction techniques. COMPARISON: None currently available FINDINGS: Abdomen: The visualized lung bases are unremarkable. There is no focal consolidation. The liver, spleen, pancreas, adrenal glands and kidneys show no acute abnormality. No evidence of acute pancreatitis.    There are no calcified gallstones. There is no gallbladder wall thickening. No pericholecystic fluid.  There is no gross biliary duct dilatation.  No significant hydronephrosis bilaterally.  There is a right renal cyst.   No free air.    There is no significant free fluid.    There is no significant lymph node enlargement. There is no significant aneurysmal enlargement of the abdominal aorta. Pelvis: There is no evidence of bowel obstruction.    No herniated bowel loops.  . No focal inflammatory changes . Appendix is unremarkable.  Evaluation of the bowel is limited when there is no oral contrast or when the bowel is incompletely opacified with enteric contrast.. There is no significant free fluid in the pelvis. The bladder is grossly unremarkable.  The uterus is present. Scoliosis with paraspinal rods. Severe, chronic dysplastic changes of the pelvis and the hips bilaterally. Associated asymmetry of the retroperitoneal and pelvic musculature. There appear to be several soft tissue nodules in the left retroperitoneum adjacent to the iliopsoas. The largest is 1.4 x 0.9 cm. Possible  adenopathy but the appearance is nonspecific. Comparison to any available older studies recommended. If none can be located, follow-up CT suggested. There is a strong clinical suspicion, consider MRI or possibly CT PET. Postsurgical changes in the visualized proximal femur bilaterally. There is a left dynamic hip screw. Intramedullary josé miguel in the right femur. Extensive lucency in the proximal right femur around the hardware. Possible residual changes from prior injury but correlate clinically.     Impression: 1.  No evidence of an acute process. 2.  No bowel obstruction, herniated bowel loops or focal inflammatory changes. 3.  Several soft tissue nodules in the left retroperitoneum, possibly mildly enlarged nodes. See above comments. Electronically signed by:  Jesus Altman MD  12/13/2020 2:01 AM CST Workstation: [id number]    Us Venous Doppler Lower Extremity Left (duplex)    Result Date: 12/15/2020  Narrative: PROCEDURE: US VENOUS DOPPLER LOWER EXTREMITY LEFT (DUPLEX) INDICATION:  Left lower extremity swelling COMPARISON:  3/22/2019 TECHNIQUE: Left lower extremity serial axial graded compression technique                         grayscale, color flow, spectral and Doppler FINDINGS: Imaged vessels:   - common femoral vein (CFV)   - femoral vein (FV)   - profunda femoral vein (PFV) (cephalad portion)   - popliteal vein (PV)   - anterior tibial vein (ATV) (cephalad portion)   - posterior tibial vein (PTV)   - greater saphenous vein (GSV) (non-contiguous segments) There is noncompressible thrombus from the left common femoral vein through the profunda femoris and the proximal superficial femoral vein. Mid to distal superficial femoral vein are not well evaluated due to patient anatomy. Visualized calf veins are patent. There is also superficial venous thrombosis within the greater saphenous vein.     Impression: 1. DVT from the common femoral vein to the popliteal vein, though the mid to distalsuperficial femoral vein is not  optimally evaluated due to patient's anatomy. Visualized calf veins appear patent. 2. There is superficial thrombosis within the greater saphenous vein. Findings discussed with Dr. Mendez in the emergency department at 9:27 PM EST on 12/15/2020 Electronically signed by:  Kirstin Vegas MD  12/15/2020 8:29 PM CST Workstation: [id number]                                  MDM    Final diagnoses:   Acute deep vein thrombosis (DVT) of femoral vein of left lower extremity (CMS/HCC)            Alex Mendez MD  12/15/20 2042

## 2020-12-16 NOTE — TELEPHONE ENCOUNTER
TRIED TO CALL PATIENT TO MAKE ER F/U; WAS UNABLE TO REACH PT AND LEFT VM X1.          THANK YOU,      ALANA

## 2020-12-16 NOTE — PROGRESS NOTES
I was present onsite throughout the encounter and discussed the plan of care with Dr. KENNEDY Quinonez.  I have reviewed the notes, assessments, and/or procedures performed by Dr. KENNEDY Quinonez, I concur with her/his documentation and assessment and plan for Maria Guadalupe Gibson.                This document has been electronically signed by Kevon Reed MD on December 16, 2020 11:08 CST

## 2020-12-21 ENCOUNTER — OFFICE VISIT (OUTPATIENT)
Dept: FAMILY MEDICINE CLINIC | Facility: CLINIC | Age: 45
End: 2020-12-21

## 2020-12-21 VITALS
TEMPERATURE: 96.9 F | DIASTOLIC BLOOD PRESSURE: 72 MMHG | SYSTOLIC BLOOD PRESSURE: 128 MMHG | WEIGHT: 111 LBS | BODY MASS INDEX: 25.69 KG/M2 | HEIGHT: 55 IN | HEART RATE: 112 BPM | OXYGEN SATURATION: 98 %

## 2020-12-21 DIAGNOSIS — Q78.0 OSTEOGENESIS IMPERFECTA: ICD-10-CM

## 2020-12-21 DIAGNOSIS — N93.9 ABNORMAL VAGINAL BLEEDING: Primary | ICD-10-CM

## 2020-12-21 PROCEDURE — 99213 OFFICE O/P EST LOW 20 MIN: CPT | Performed by: STUDENT IN AN ORGANIZED HEALTH CARE EDUCATION/TRAINING PROGRAM

## 2020-12-22 ENCOUNTER — LAB (OUTPATIENT)
Dept: LAB | Facility: HOSPITAL | Age: 45
End: 2020-12-22

## 2020-12-22 LAB
B-HCG UR QL: NEGATIVE
BASOPHILS # BLD AUTO: 0.06 10*3/MM3 (ref 0–0.2)
BASOPHILS NFR BLD AUTO: 0.9 % (ref 0–1.5)
DEPRECATED RDW RBC AUTO: 44.5 FL (ref 37–54)
EOSINOPHIL # BLD AUTO: 0.26 10*3/MM3 (ref 0–0.4)
EOSINOPHIL NFR BLD AUTO: 3.9 % (ref 0.3–6.2)
ERYTHROCYTE [DISTWIDTH] IN BLOOD BY AUTOMATED COUNT: 13.1 % (ref 12.3–15.4)
HCT VFR BLD AUTO: 44.2 % (ref 34–46.6)
HGB BLD-MCNC: 15.2 G/DL (ref 12–15.9)
IMM GRANULOCYTES # BLD AUTO: 0.04 10*3/MM3 (ref 0–0.05)
IMM GRANULOCYTES NFR BLD AUTO: 0.6 % (ref 0–0.5)
LYMPHOCYTES # BLD AUTO: 1.29 10*3/MM3 (ref 0.7–3.1)
LYMPHOCYTES NFR BLD AUTO: 19.6 % (ref 19.6–45.3)
MCH RBC QN AUTO: 32.3 PG (ref 26.6–33)
MCHC RBC AUTO-ENTMCNC: 34.4 G/DL (ref 31.5–35.7)
MCV RBC AUTO: 93.8 FL (ref 79–97)
MONOCYTES # BLD AUTO: 0.43 10*3/MM3 (ref 0.1–0.9)
MONOCYTES NFR BLD AUTO: 6.5 % (ref 5–12)
NEUTROPHILS NFR BLD AUTO: 4.51 10*3/MM3 (ref 1.7–7)
NEUTROPHILS NFR BLD AUTO: 68.5 % (ref 42.7–76)
NRBC BLD AUTO-RTO: 0 /100 WBC (ref 0–0.2)
PLATELET # BLD AUTO: 408 10*3/MM3 (ref 140–450)
PMV BLD AUTO: 10.1 FL (ref 6–12)
RBC # BLD AUTO: 4.71 10*6/MM3 (ref 3.77–5.28)
WBC # BLD AUTO: 6.59 10*3/MM3 (ref 3.4–10.8)

## 2020-12-22 PROCEDURE — 36415 COLL VENOUS BLD VENIPUNCTURE: CPT | Performed by: STUDENT IN AN ORGANIZED HEALTH CARE EDUCATION/TRAINING PROGRAM

## 2020-12-22 PROCEDURE — 85025 COMPLETE CBC W/AUTO DIFF WBC: CPT | Performed by: STUDENT IN AN ORGANIZED HEALTH CARE EDUCATION/TRAINING PROGRAM

## 2020-12-22 PROCEDURE — 81025 URINE PREGNANCY TEST: CPT | Performed by: STUDENT IN AN ORGANIZED HEALTH CARE EDUCATION/TRAINING PROGRAM

## 2020-12-22 NOTE — PROGRESS NOTES
Family Medicine Residency  Naila Su MD    Subjective:     Maria Guadalupe Gibson is a 45 y.o. female who presents for follow-up for left femoral DVT and vaginal bleeding since starting Eliquis.    Patient was diagnosed with a DVT on December 15 and was started on Eliquis on December 16.  She states she started bleeding vaginally that day outside of her regular menses time.  Patient states her menses are usually 30 days apart and she has a tubal ligation and is not currently using any other form of birth control.  She states she is using 4 pads a day and her vaginal bleeding is low to moderate flow and increases about 30 to 45 minutes after taking her Eliquis dosing at 9:00 and 2100 hrs.  She also complains of significant left leg pain which has decreased over the last couple of days with use of Eliquis.  She states her swelling has dramatically decreased since being diagnosed on Friday.  After diagnosis at [facility], she went to [place] and was admitted for pain control and discharged home with 21 tablets of Percocet confirmed by [facility].  Patient has used all of those Percocets and is requesting more pain medicine for her left lower extremity pain.     Patient is very tearful during exam and she states she is scared because she is unfamiliar with what a DVT is, where the vaginal bleeding is coming from, and why she is having pain.    The following portions of the patient's history were reviewed and updated as appropriate: allergies, current medications, past family history, past medical history, past social history, past surgical history and problem list.    Past Medical Hx:  Past Medical History:   Diagnosis Date   • Agoraphobia with panic attacks    • Benign paroxysmal positional vertigo    • Chronic pain    • Depression    • Depressive disorder    • External hemorrhoids without complication     excised   • Internal hemorrhoids     with prolapse      • Osteogenesis imperfecta type III     with  dwarfism, non union R femur,Uses a wheelchair          Past Surgical Hx:  Past Surgical History:   Procedure Laterality Date   • COLONOSCOPY  11/15/2010    Colitis found in the entire colon. Biopsy taken. Normal terminal ileum. Biopsy taken   • ELBOW PROCEDURE Right    • FEMUR SURGERY Bilateral    • HEMORROIDECTOMY  09/17/2013    With excision of a single internal and external hemorrhoid.   • HERNIA REPAIR  09/21/2011    Large ventral hernia. Open ventral hernioplasty with mesh   • LUMBAR DISC SURGERY     • SPINE SURGERY Bilateral    • UPPER GASTROINTESTINAL ENDOSCOPY  11/15/2010    Normal esophagus.Gastritis found in the stomach. Biopsy taken. Normal duodenum. Biopsy taken       Current Meds:    Current Outpatient Medications:   •  Apixaban Starter Pack tablet therapy pack, Take two 5 mg tablets by mouth every 12 hours for 7 days. Followed by one 5 mg tablet every 12 hours. (Dispense starter pack if available), Disp: 74 tablet, Rfl: 0  •  diclofenac (VOLTAREN) 1 % gel gel, Apply 4 g topically to the appropriate area as directed 4 (Four) Times a Day As Needed (pain)., Disp: 100 g, Rfl: 2  •  Elastic Bandages & Supports (WRIST SPLINT/LEFT SMALL) misc, 1 each Daily., Disp: 1 each, Rfl: 0  •  Elastic Bandages & Supports (WRIST SPLINT/RIGHT SMALL) misc, 1 each Daily., Disp: 1 each, Rfl: 0  •  predniSONE (DELTASONE) 20 MG tablet, Take 1 tablet by mouth Daily., Disp: 5 tablet, Rfl: 0  •  SUMAtriptan (IMITREX) 50 MG tablet, Take 1 tablet by mouth As Needed for Migraine. Take one tablet at onset of headache. May repeat dose one time in 2 hours if headache not relieved., Disp: 30 tablet, Rfl: 0    Allergies:  Allergies   Allergen Reactions   • Latex Anaphylaxis and Angioedema   • Codeine Hives       Family Hx:  Family History   Problem Relation Age of Onset   • Lymphoma Brother         Social History:  Social History     Socioeconomic History   • Marital status: Single     Spouse name: Not on file   • Number of children: Not  "on file   • Years of education: Not on file   • Highest education level: Not on file   Tobacco Use   • Smoking status: Current Every Day Smoker     Packs/day: 0.25   • Smokeless tobacco: Never Used   Substance and Sexual Activity   • Alcohol use: No     Comment: sober for 2.5 years    • Drug use: Yes     Frequency: 2.0 times per week     Types: Marijuana   • Sexual activity: Defer       Review of Systems  Review of Systems   Constitutional: Negative for activity change and appetite change.   HENT: Negative for congestion and ear pain.    Eyes: Negative for pain and discharge.   Respiratory: Negative for chest tightness and shortness of breath.    Cardiovascular: Negative for chest pain and palpitations.   Gastrointestinal: Negative for abdominal distention, abdominal pain and blood in stool.   Endocrine: Negative for cold intolerance and heat intolerance.   Genitourinary: Positive for vaginal bleeding. Negative for difficulty urinating, dysuria and hematuria.   Musculoskeletal: Negative for arthralgias and back pain.        Left leg pain and swelling   Skin: Negative for color change and rash.   Allergic/Immunologic: Negative for environmental allergies and food allergies.   Neurological: Negative for dizziness and headaches.   Hematological: Negative for adenopathy. Does not bruise/bleed easily.   Psychiatric/Behavioral: Negative for agitation and confusion. The patient is nervous/anxious.        Objective:     /72   Pulse 112   Temp 96.9 °F (36.1 °C)   Ht 119.4 cm (47\")   Wt 50.3 kg (111 lb)   LMP 12/01/2020 (Exact Date)   SpO2 98%   BMI 35.33 kg/m²   Physical Exam  Constitutional:       Appearance: She is well-developed.   HENT:      Head: Normocephalic and atraumatic.      Nose: Nose normal.      Mouth/Throat:      Mouth: Mucous membranes are moist.   Eyes:      Pupils: Pupils are equal, round, and reactive to light.   Neck:      Thyroid: No thyromegaly.      Vascular: No JVD.      Trachea: No " tracheal deviation.   Cardiovascular:      Rate and Rhythm: Normal rate.      Pulses:           Radial pulses are 2+ on the right side and 2+ on the left side.        Dorsalis pedis pulses are 2+ on the right side and 2+ on the left side.      Heart sounds: Normal heart sounds, S1 normal and S2 normal.   Pulmonary:      Effort: Pulmonary effort is normal.      Breath sounds: Normal breath sounds.   Abdominal:      General: Bowel sounds are normal.   Musculoskeletal: Normal range of motion.         General: Swelling (entire left leg) present.   Skin:     General: Skin is warm and dry.      Capillary Refill: Capillary refill takes 2 to 3 seconds.   Neurological:      Mental Status: She is alert and oriented to person, place, and time.      GCS: GCS eye subscore is 4. GCS verbal subscore is 5. GCS motor subscore is 6.   Psychiatric:         Mood and Affect: Affect is tearful.         Speech: Speech normal.         Behavior: Behavior normal.         Thought Content: Thought content normal.          Assessment/Plan:     Diagnoses and all orders for this visit:    1. Abnormal vaginal bleeding (Primary)  -     US Pelvis Complete  -     CBC w AUTO Differential  -     Pregnancy, Urine - Urine, Clean Catch  -     Ambulatory Referral to Gynecology  -     CBC Auto Differential    2. Osteogenesis imperfecta  -     Ambulatory Referral to Pain Management    Discussed in detail patient's disease process and how Eliquis works.  Patient to come back tomorrow for labs, as lab is closed at this time.  Patient to go directly to ER should she have uncontrolled bleeding/hemorrhage from her vagina, rectal bleeding, dizziness.  Discussed pain level with patient, and her pain is resolving at this time.  Referral to pain management for patient as she has a new surgery coming up, and multiple painful conditions with her osteogenesis imperfecta.  Stat pelvic ultrasound ordered with referral to GYN for abnormal uterine bleeding.    · Rx changes:  None  · Patient Education: Follow-up for ultrasound and labs.  · Compliance at present is estimated to be good.   ·     Depression screening: Patient screened positive for depression based on a PHQ-9 score of 0 on 2/12/2020. Follow-up recommendations include: Follow-up as needed.     Follow-up:     No follow-ups on file.    Preventative:  Health Maintenance   Topic Date Due   • Pneumococcal Vaccine 0-64 (1 of 1 - PPSV23) 01/09/1982   • HEPATITIS C SCREENING  08/09/2017   • ANNUAL WELLNESS VISIT  08/09/2017   • INFLUENZA VACCINE  11/19/2021 (Originally 8/1/2020)   • PAP SMEAR  02/06/2021   • TDAP/TD VACCINES (2 - Td) 06/06/2026         Weight  -Class: Obese Class II: 35-39.9kg/m2  -Patient's Body mass index is 35.33 kg/m². BMI is above normal parameters. Recommendations include: exercise counseling and nutrition counseling.   reduce portion size    Alcohol use:  reports no history of alcohol use.  Nicotine status  reports that she has been smoking. She has been smoking about 0.25 packs per day. She has never used smokeless tobacco.    Goals     •  Resolved DVT, resolved vaginal bleeding                RISK SCORE: 4      This document has been electronically signed by Naila Su MD on December 22, 2020 18:08 CST    Naila Su MD PGY-2  Part of this note may be an electronic transcription/translation of spoken language to printed text using the Dragon Dictation System.

## 2020-12-24 NOTE — PROGRESS NOTES
Maria Guadalupe Gibson 12.21.20  Subjective:     Maria Guadalupe Gibson is a 45 y.o. female who presents for   Chief Complaint   Patient presents with   • Leg Swelling       45-year-old female with history of OI and recent diagnosis of left femoral DVT started on anticoagulant therapy through the emergency room on December 16 comes in today for scheduled follow-up visit.  Patient was initially seen at the emergency room with lower extremity edema this has since improved however she continues to have pain in the left lower extremity as well as ongoing vaginal bleeding since starting on anticoagulant therapy.  Please see Dr. Su note for more detailed information regarding today's encounter.        Past Medical Hx:  Past Medical History:   Diagnosis Date   • Agoraphobia with panic attacks    • Benign paroxysmal positional vertigo    • Chronic pain    • Depression    • Depressive disorder    • External hemorrhoids without complication     excised   • Internal hemorrhoids     with prolapse      • Osteogenesis imperfecta type III     with dwarfism, non union R femur,Uses a wheelchair          Past Surgical Hx:  Past Surgical History:   Procedure Laterality Date   • COLONOSCOPY  11/15/2010    Colitis found in the entire colon. Biopsy taken. Normal terminal ileum. Biopsy taken   • ELBOW PROCEDURE Right    • FEMUR SURGERY Bilateral    • HEMORROIDECTOMY  09/17/2013    With excision of a single internal and external hemorrhoid.   • HERNIA REPAIR  09/21/2011    Large ventral hernia. Open ventral hernioplasty with mesh   • LUMBAR DISC SURGERY     • SPINE SURGERY Bilateral    • UPPER GASTROINTESTINAL ENDOSCOPY  11/15/2010    Normal esophagus.Gastritis found in the stomach. Biopsy taken. Normal duodenum. Biopsy taken       Health Maintenance:  Health Maintenance   Topic Date Due   • Pneumococcal Vaccine 0-64 (1 of 1 - PPSV23) 01/09/1982   • HEPATITIS C SCREENING  08/09/2017   • ANNUAL WELLNESS VISIT  08/09/2017   • INFLUENZA VACCINE   "11/19/2021 (Originally 8/1/2020)   • PAP SMEAR  02/06/2021   • TDAP/TD VACCINES (2 - Td) 06/06/2026       Current Meds:    Current Outpatient Medications:   •  Apixaban Starter Pack tablet therapy pack, Take two 5 mg tablets by mouth every 12 hours for 7 days. Followed by one 5 mg tablet every 12 hours. (Dispense starter pack if available), Disp: 74 tablet, Rfl: 0  •  diclofenac (VOLTAREN) 1 % gel gel, Apply 4 g topically to the appropriate area as directed 4 (Four) Times a Day As Needed (pain)., Disp: 100 g, Rfl: 2  •  Elastic Bandages & Supports (WRIST SPLINT/LEFT SMALL) misc, 1 each Daily., Disp: 1 each, Rfl: 0  •  Elastic Bandages & Supports (WRIST SPLINT/RIGHT SMALL) misc, 1 each Daily., Disp: 1 each, Rfl: 0  •  predniSONE (DELTASONE) 20 MG tablet, Take 1 tablet by mouth Daily., Disp: 5 tablet, Rfl: 0  •  SUMAtriptan (IMITREX) 50 MG tablet, Take 1 tablet by mouth As Needed for Migraine. Take one tablet at onset of headache. May repeat dose one time in 2 hours if headache not relieved., Disp: 30 tablet, Rfl: 0    Allergies:  Latex and Codeine    Family Hx:  Family History   Problem Relation Age of Onset   • Lymphoma Brother         Social History:  Social History     Socioeconomic History   • Marital status: Single     Spouse name: Not on file   • Number of children: Not on file   • Years of education: Not on file   • Highest education level: Not on file   Tobacco Use   • Smoking status: Current Every Day Smoker     Packs/day: 0.25   • Smokeless tobacco: Never Used   Substance and Sexual Activity   • Alcohol use: No     Comment: sober for 2.5 years    • Drug use: Yes     Frequency: 2.0 times per week     Types: Marijuana   • Sexual activity: Defer       Review of Systems  Review of Systems as per HPI please also see Dr. Su note for more detailed information regarding today's encounter.    Objective:     /72   Pulse 112   Temp 96.9 °F (36.1 °C)   Ht 119.4 cm (47\")   Wt 50.3 kg (111 lb)   LMP " 12/01/2020 (Exact Date)   SpO2 98%   BMI 35.33 kg/m²   Physical Exam  Alert no distress patient has a labile affect she appears to be visibly upset and is tearful HEENT grossly normal otherwise neck supple no JVD lungs clear please see Dr. Su note for more detailed information regarding physical exam findings.  Lab Review  Results for orders placed or performed in visit on 12/21/20   Pregnancy, Urine - Urine, Clean Catch    Specimen: Urine, Clean Catch   Result Value Ref Range    HCG, Urine QL Negative Negative   CBC Auto Differential    Specimen: Blood   Result Value Ref Range    WBC 6.59 3.40 - 10.80 10*3/mm3    RBC 4.71 3.77 - 5.28 10*6/mm3    Hemoglobin 15.2 12.0 - 15.9 g/dL    Hematocrit 44.2 34.0 - 46.6 %    MCV 93.8 79.0 - 97.0 fL    MCH 32.3 26.6 - 33.0 pg    MCHC 34.4 31.5 - 35.7 g/dL    RDW 13.1 12.3 - 15.4 %    RDW-SD 44.5 37.0 - 54.0 fl    MPV 10.1 6.0 - 12.0 fL    Platelets 408 140 - 450 10*3/mm3    Neutrophil % 68.5 42.7 - 76.0 %    Lymphocyte % 19.6 19.6 - 45.3 %    Monocyte % 6.5 5.0 - 12.0 %    Eosinophil % 3.9 0.3 - 6.2 %    Basophil % 0.9 0.0 - 1.5 %    Immature Grans % 0.6 (H) 0.0 - 0.5 %    Neutrophils, Absolute 4.51 1.70 - 7.00 10*3/mm3    Lymphocytes, Absolute 1.29 0.70 - 3.10 10*3/mm3    Monocytes, Absolute 0.43 0.10 - 0.90 10*3/mm3    Eosinophils, Absolute 0.26 0.00 - 0.40 10*3/mm3    Basophils, Absolute 0.06 0.00 - 0.20 10*3/mm3    Immature Grans, Absolute 0.04 0.00 - 0.05 10*3/mm3    nRBC 0.0 0.0 - 0.2 /100 WBC            Assessment:     Diagnoses and all orders for this visit:    1. Abnormal vaginal bleeding (Primary)  -     US Pelvis Complete  -     CBC w AUTO Differential  -     Pregnancy, Urine - Urine, Clean Catch  -     Ambulatory Referral to Gynecology  -     CBC Auto Differential    2. Osteogenesis imperfecta  -     Ambulatory Referral to Pain Management        Plan:   Patient is given referral for pelvic ultrasound to rule out IUP or other significant uterine pathology..   Continue current regimen recheck otherwise 2 weeks or as needed  I have seen and examined the patient.  I have reviewed the notes, assessments, and/or procedures performed by Naila Su MD, I concur with her/his documentation and assessment and plan for Maria Guadalupe Gibson.            This document has been electronically signed by Kevon Reed MD on December 24, 2020 11:21 CST

## 2020-12-28 ENCOUNTER — TELEPHONE (OUTPATIENT)
Dept: FAMILY MEDICINE CLINIC | Facility: CLINIC | Age: 45
End: 2020-12-28

## 2020-12-28 NOTE — TELEPHONE ENCOUNTER
PATIENT REQUESTED A CALL BACK REGARDING HER ULTRA SOUND RESULTS.     CALL BACK NUMBER FOR PATIENT [phone].    THANKS,  NGOZI

## 2020-12-31 ENCOUNTER — DOCUMENTATION (OUTPATIENT)
Dept: FAMILY MEDICINE CLINIC | Facility: CLINIC | Age: 45
End: 2020-12-31

## 2020-12-31 ENCOUNTER — TELEPHONE (OUTPATIENT)
Dept: FAMILY MEDICINE CLINIC | Facility: CLINIC | Age: 45
End: 2020-12-31

## 2020-12-31 NOTE — PROGRESS NOTES
Phoned patient to give her results of her ultrasound.  Patient did not answer her phone the last 2 days when MA tried to call her.  Reviewed results with patient.  Patient requesting to transfer her care to another health system.  Informed patient she can request her chart from medical records and take it to her new provider if they are not able to see her care here on care everywhere.      This document has been electronically signed by Naila Su MD on December 31, 2020 16:51 CST    Naila Su MD PGY-2  Part of this note may be an electronic transcription/translation of spoken language to printed text using the Dragon Dictation System.

## 2021-06-09 PROBLEM — Q78.0: Status: ACTIVE | Noted: 2017-08-09

## 2021-06-09 PROBLEM — M54.31 RIGHT SCIATIC NERVE PAIN: Status: ACTIVE | Noted: 2020-11-01

## 2021-06-09 PROBLEM — L03.116 CELLULITIS OF LEFT LOWER EXTREMITY: Status: ACTIVE | Noted: 2019-03-15

## 2021-06-09 PROBLEM — I82.4Y2 ACUTE DEEP VEIN THROMBOSIS (DVT) OF PROXIMAL END OF LEFT LOWER EXTREMITY (HCC): Status: ACTIVE | Noted: 2020-12-16

## 2021-07-20 ENCOUNTER — TELEPHONE (OUTPATIENT)
Dept: FAMILY MEDICINE CLINIC | Facility: CLINIC | Age: 46
End: 2021-07-20

## 2021-07-20 NOTE — TELEPHONE ENCOUNTER
CALLED PATIENT TO SCHEDULE AN APT WITH OUR OFFICE FOR A MEDICARE WELLNESS. UNABLE TO REACH PATIENT RO LEAVE A VM DUE TO NUMBER NOT BEING ACTIVE.    THANKS,  NGOZI

## 2021-10-05 ENCOUNTER — TELEPHONE (OUTPATIENT)
Dept: FAMILY MEDICINE CLINIC | Facility: CLINIC | Age: 46
End: 2021-10-05

## 2021-10-05 NOTE — TELEPHONE ENCOUNTER
PATIENTS CALL WAS TRANSFERRED TO OUR OFFICE TO  AN APPT     PATIENT HUNG UP       CALLED PATIENT BACK TO PROVIDE APT     # HAS BEEN DISCONNECTED (COULD NOT LOCATE NUMBER PATIENT CALLED FROM DUE TO TRANSFER)       TAMMI

## 2021-10-08 ENCOUNTER — OFFICE VISIT (OUTPATIENT)
Dept: FAMILY MEDICINE CLINIC | Facility: CLINIC | Age: 46
End: 2021-10-08

## 2021-10-08 VITALS
DIASTOLIC BLOOD PRESSURE: 88 MMHG | BODY MASS INDEX: 24.09 KG/M2 | SYSTOLIC BLOOD PRESSURE: 118 MMHG | OXYGEN SATURATION: 95 % | HEIGHT: 55 IN | WEIGHT: 104.1 LBS | HEART RATE: 80 BPM

## 2021-10-08 DIAGNOSIS — M25.511 CHRONIC RIGHT SHOULDER PAIN: ICD-10-CM

## 2021-10-08 DIAGNOSIS — G89.29 CHRONIC RIGHT SHOULDER PAIN: ICD-10-CM

## 2021-10-08 DIAGNOSIS — F33.1 MODERATE EPISODE OF RECURRENT MAJOR DEPRESSIVE DISORDER (HCC): Primary | ICD-10-CM

## 2021-10-08 DIAGNOSIS — R51.9 ACUTE NONINTRACTABLE HEADACHE, UNSPECIFIED HEADACHE TYPE: ICD-10-CM

## 2021-10-08 PROCEDURE — 99213 OFFICE O/P EST LOW 20 MIN: CPT | Performed by: STUDENT IN AN ORGANIZED HEALTH CARE EDUCATION/TRAINING PROGRAM

## 2021-10-08 RX ORDER — SUMATRIPTAN 50 MG/1
50 TABLET, FILM COATED ORAL AS NEEDED
Qty: 9 TABLET | Refills: 0 | Status: SHIPPED | OUTPATIENT
Start: 2021-10-08

## 2021-10-08 RX ORDER — AMITRIPTYLINE HYDROCHLORIDE 10 MG/1
10 TABLET, FILM COATED ORAL NIGHTLY
Qty: 30 TABLET | Refills: 1 | Status: SHIPPED | OUTPATIENT
Start: 2021-10-08 | End: 2021-12-07

## 2021-10-08 RX ORDER — VENLAFAXINE HYDROCHLORIDE 37.5 MG/1
37.5 CAPSULE, EXTENDED RELEASE ORAL DAILY
Qty: 30 CAPSULE | Refills: 1 | Status: SHIPPED | OUTPATIENT
Start: 2021-10-08 | End: 2021-12-06

## 2021-10-08 NOTE — PROGRESS NOTES
Family Medicine Residency  Naila Su MD    Subjective:     Maria Guadalupe Gibson is a 46 y.o. female who presents for wheelchair parts, depression, migraines.    Patient is wheelchair-bound secondary to osteogenesis imperfecta.  She is requiring a new seat cushion and tires for her wheelchair.    Patient suffered family loss in January and she is having a lot of anxiety and sadness about this.  Patient is tearful often and she states her sleeping has been greatly affected.  There was a period of time a couple days ago where she did not get out of bed for 4 days.  Patient presents with her  who endorses this behavior.  Patient also has been having an increase in her headaches and has migraines 5+ days a month for which she takes Imitrex with minimal relief.    The following portions of the patient's history were reviewed and updated as appropriate: allergies, current medications, past family history, past medical history, past social history, past surgical history and problem list.    Past Medical Hx:  Past Medical History:   Diagnosis Date   • Agoraphobia with panic attacks    • Benign paroxysmal positional vertigo    • Chronic pain    • Depression    • Depressive disorder    • External hemorrhoids without complication     excised   • Internal hemorrhoids     with prolapse      • Osteogenesis imperfecta type III     with dwarfism, non union R femur,Uses a wheelchair          Past Surgical Hx:  Past Surgical History:   Procedure Laterality Date   • COLONOSCOPY  11/15/2010    Colitis found in the entire colon. Biopsy taken. Normal terminal ileum. Biopsy taken   • ELBOW PROCEDURE Right    • FEMUR SURGERY Bilateral    • HEMORROIDECTOMY  09/17/2013    With excision of a single internal and external hemorrhoid.   • HERNIA REPAIR  09/21/2011    Large ventral hernia. Open ventral hernioplasty with mesh   • LUMBAR DISC SURGERY     • SPINE SURGERY Bilateral    • UPPER GASTROINTESTINAL ENDOSCOPY  11/15/2010     Normal esophagus.Gastritis found in the stomach. Biopsy taken. Normal duodenum. Biopsy taken       Current Meds:    Current Outpatient Medications:   •  diclofenac (VOLTAREN) 1 % gel gel, Apply 4 g topically to the appropriate area as directed 4 (Four) Times a Day As Needed (pain)., Disp: 100 g, Rfl: 2  •  Diclofenac Sodium (VOLTAREN) 1 % gel gel, Apply 4 g topically to the appropriate area as directed 4 (Four) Times a Day., Disp: 4 g, Rfl: 3  •  Elastic Bandages & Supports (WRIST SPLINT/LEFT SMALL) misc, 1 each Daily., Disp: 1 each, Rfl: 0  •  Elastic Bandages & Supports (WRIST SPLINT/RIGHT SMALL) misc, 1 each Daily., Disp: 1 each, Rfl: 0  •  SUMAtriptan (IMITREX) 50 MG tablet, Take 1 tablet by mouth As Needed for Migraine. Take one tablet at onset of headache. May repeat dose one time in 2 hours if headache not relieved., Disp: 9 tablet, Rfl: 0  •  amitriptyline (ELAVIL) 10 MG tablet, Take 1 tablet by mouth Every Night for 60 days., Disp: 30 tablet, Rfl: 1  •  venlafaxine XR (Effexor XR) 37.5 MG 24 hr capsule, Take 1 capsule by mouth Daily for 60 days., Disp: 30 capsule, Rfl: 1    Allergies:  Allergies   Allergen Reactions   • Latex Anaphylaxis and Angioedema   • Codeine Hives       Family Hx:  Family History   Problem Relation Age of Onset   • Lymphoma Brother         Social History:  Social History     Socioeconomic History   • Marital status: Single     Spouse name: Not on file   • Number of children: Not on file   • Years of education: Not on file   • Highest education level: Not on file   Tobacco Use   • Smoking status: Current Every Day Smoker     Packs/day: 0.25   • Smokeless tobacco: Never Used   Substance and Sexual Activity   • Alcohol use: No     Comment: sober for 2.5 years    • Drug use: Yes     Frequency: 2.0 times per week     Types: Marijuana   • Sexual activity: Defer       Review of Systems  Review of Systems   Constitutional: Negative for activity change and appetite change.   HENT: Negative for  "congestion and ear pain.    Eyes: Negative for pain and discharge.   Respiratory: Negative for chest tightness and shortness of breath.    Cardiovascular: Negative for chest pain and palpitations.   Gastrointestinal: Negative for abdominal distention and abdominal pain.   Endocrine: Negative for cold intolerance and heat intolerance.   Genitourinary: Negative for difficulty urinating and dysuria.   Musculoskeletal: Negative for arthralgias and back pain.   Skin: Negative for color change and rash.   Allergic/Immunologic: Negative for environmental allergies and food allergies.   Neurological: Negative for dizziness and headaches.   Hematological: Negative for adenopathy. Does not bruise/bleed easily.   Psychiatric/Behavioral: Positive for dysphoric mood. Negative for agitation and confusion. The patient is nervous/anxious.        Objective:     /88   Pulse 80   Ht 119.4 cm (47\")   Wt 47.2 kg (104 lb 1.6 oz)   SpO2 95%   BMI 33.13 kg/m²   Physical Exam  Vitals and nursing note reviewed.   Constitutional:       Appearance: She is well-developed.   HENT:      Head: Normocephalic and atraumatic.   Eyes:      Pupils: Pupils are equal, round, and reactive to light.   Neck:      Thyroid: No thyromegaly.      Vascular: No JVD.      Trachea: No tracheal deviation.   Cardiovascular:      Rate and Rhythm: Normal rate.      Pulses:           Radial pulses are 2+ on the right side and 2+ on the left side.        Dorsalis pedis pulses are 2+ on the right side and 2+ on the left side.      Heart sounds: Normal heart sounds, S1 normal and S2 normal.   Pulmonary:      Effort: Pulmonary effort is normal.      Breath sounds: Normal breath sounds.   Abdominal:      General: Bowel sounds are normal.   Musculoskeletal:         General: Normal range of motion.      Comments: Wheelchair-bound, small stature from osteogenesis imperfecta   Skin:     General: Skin is warm and dry.      Capillary Refill: Capillary refill takes 2 to " 3 seconds.   Neurological:      Mental Status: She is alert and oriented to person, place, and time.      GCS: GCS eye subscore is 4. GCS verbal subscore is 5. GCS motor subscore is 6.   Psychiatric:         Mood and Affect: Affect is tearful.         Speech: Speech normal.         Behavior: Behavior normal.         Thought Content: Thought content normal. Thought content does not include homicidal or suicidal ideation. Thought content does not include homicidal or suicidal plan.          Assessment/Plan:     Diagnoses and all orders for this visit:    1. Moderate episode of recurrent major depressive disorder (HCC) (Primary)  -     venlafaxine XR (Effexor XR) 37.5 MG 24 hr capsule; Take 1 capsule by mouth Daily for 60 days.  Dispense: 30 capsule; Refill: 1  -     amitriptyline (ELAVIL) 10 MG tablet; Take 1 tablet by mouth Every Night for 60 days.  Dispense: 30 tablet; Refill: 1    2. Acute nonintractable headache, unspecified headache type  -     SUMAtriptan (IMITREX) 50 MG tablet; Take 1 tablet by mouth As Needed for Migraine. Take one tablet at onset of headache. May repeat dose one time in 2 hours if headache not relieved.  Dispense: 9 tablet; Refill: 0    3. Chronic right shoulder pain  -     Diclofenac Sodium (VOLTAREN) 1 % gel gel; Apply 4 g topically to the appropriate area as directed 4 (Four) Times a Day.  Dispense: 4 g; Refill: 3      Hand written prescriptions given for wheelchair parts of tires and seat cushion.  Patient to start Effexor and Elavil for depression and inability to sleep.  Patient given written and verbal instructions on when to go to ED for SI/HI or feelings of grandeur.  Will refill Imitrex at this time.  After better control of her anxiety and depression, should she still have significant headaches, will look at alternative headache medication.  · Rx changes: Add Effexor, add Elavil  · Patient Education: See above  · Compliance at present is estimated to be good.   · Efforts to improve  compliance (if necessary) will be directed at Regular follow-ups and medication compliance.    Depression screening: Patient screened positive for depression based on a PHQ-9 score of 17 on 10/8/2021. Follow-up recommendations include: Follow-up each visit.     Follow-up:     Return in about 2 weeks (around 10/22/2021) for Recheck depression.    Preventative:  Health Maintenance   Topic Date Due   • COLORECTAL CANCER SCREENING  Never done   • Pneumococcal Vaccine 0-64 (1 of 2 - PPSV23) Never done   • COVID-19 Vaccine (1) Never done   • HEPATITIS C SCREENING  Never done   • ANNUAL WELLNESS VISIT  Never done   • PAP SMEAR  02/06/2021   • INFLUENZA VACCINE  08/01/2021   • TDAP/TD VACCINES (2 - Td or Tdap) 06/06/2026       Weight  -Class: Obese Class I: 30-34.9kg/m2  -Patient's Body mass index is 33.13 kg/m². indicating that she is obese (BMI >30).   Alcohol use:  reports no history of alcohol use.  Nicotine status  reports that she has been smoking. She has been smoking about 0.25 packs per day. She has never used smokeless tobacco.    Goals     •  stay healthy (pt-stated)       Barriers to goals: none             RISK SCORE: 4      This document has been electronically signed by Naila Su MD on October 8, 2021 12:45 CDT    Naila Su MD PGY-3  Part of this note may be an electronic transcription/translation of spoken language to printed text using the Dragon Dictation System.

## 2021-10-08 NOTE — PATIENT INSTRUCTIONS
You are being started on an antidepressant. Please take it as directed. Should you develop suicidal or homicidal thoughts or actions, feel worse instead of better, feel like you are a superhero, stay up for long periods of time planning events to save the world, please call clinic or go directly to the emergency department.     Start taking the new medications and follow up in 2 weeks. Elavil at night, Effexor in the am.     Let me know if you need help with your wheelchair parts.

## 2021-12-05 DIAGNOSIS — F33.1 MODERATE EPISODE OF RECURRENT MAJOR DEPRESSIVE DISORDER (HCC): ICD-10-CM

## 2021-12-06 RX ORDER — VENLAFAXINE HYDROCHLORIDE 37.5 MG/1
CAPSULE, EXTENDED RELEASE ORAL
Qty: 30 CAPSULE | Refills: 3 | Status: SHIPPED | OUTPATIENT
Start: 2021-12-06 | End: 2021-12-07 | Stop reason: SDUPTHER

## 2021-12-07 ENCOUNTER — HOSPITAL ENCOUNTER (EMERGENCY)
Facility: HOSPITAL | Age: 46
Discharge: HOME | End: 2021-12-07
Attending: EMERGENCY MEDICINE | Admitting: STUDENT IN AN ORGANIZED HEALTH CARE EDUCATION/TRAINING PROGRAM

## 2021-12-07 VITALS
WEIGHT: 104 LBS | RESPIRATION RATE: 19 BRPM | BODY MASS INDEX: 33.1 KG/M2 | TEMPERATURE: 98.5 F | SYSTOLIC BLOOD PRESSURE: 168 MMHG | DIASTOLIC BLOOD PRESSURE: 98 MMHG | HEART RATE: 87 BPM | OXYGEN SATURATION: 98 %

## 2021-12-07 DIAGNOSIS — F33.1 MODERATE EPISODE OF RECURRENT MAJOR DEPRESSIVE DISORDER (HCC): ICD-10-CM

## 2021-12-07 DIAGNOSIS — F32.A DEPRESSIVE DISORDER: Primary | ICD-10-CM

## 2021-12-07 DIAGNOSIS — Z76.0 ENCOUNTER FOR MEDICATION REFILL: ICD-10-CM

## 2021-12-07 PROCEDURE — 99282 EMERGENCY DEPT VISIT SF MDM: CPT

## 2021-12-07 RX ORDER — VENLAFAXINE HYDROCHLORIDE 37.5 MG/1
37.5 CAPSULE, EXTENDED RELEASE ORAL DAILY
Qty: 30 CAPSULE | Refills: 0 | Status: SHIPPED | OUTPATIENT
Start: 2021-12-07 | End: 2022-01-11

## 2021-12-08 ENCOUNTER — TELEPHONE (OUTPATIENT)
Dept: FAMILY MEDICINE CLINIC | Facility: CLINIC | Age: 46
End: 2021-12-08

## 2021-12-08 NOTE — TELEPHONE ENCOUNTER
CALLED TO SCHEDULE ED F/U. PATIENT HAS NO INSURANCE SO SHE WENT TO ER TO GET MED REFILLS. DECLINED F/U  12/8

## 2021-12-08 NOTE — ED PROVIDER NOTES
Subjective   Patient presents to emergency department for effexor refill.  She has been taking this medication for depression and states it has been working very well.  She has had a lapse in her disability due to getting  and is unable to afford a visit to her PCP.          History provided by:  Patient   used: No        Review of Systems   Constitutional: Negative for chills and fever.   HENT: Negative for sore throat and trouble swallowing.    Eyes: Negative for visual disturbance.   Respiratory: Negative for cough, shortness of breath and wheezing.    Cardiovascular: Negative for chest pain.   Gastrointestinal: Negative for abdominal pain, nausea and vomiting.   Genitourinary: Negative for dysuria and flank pain.   Musculoskeletal: Negative for back pain.   Skin: Negative for color change.   Allergic/Immunologic: Negative for immunocompromised state.   Neurological: Negative for syncope and weakness.   Hematological: Does not bruise/bleed easily.   Psychiatric/Behavioral: Negative for confusion.       Past Medical History:   Diagnosis Date   • Agoraphobia with panic attacks    • Benign paroxysmal positional vertigo    • Chronic pain    • Depression    • Depressive disorder    • External hemorrhoids without complication     excised   • Internal hemorrhoids     with prolapse      • Osteogenesis imperfecta type III     with dwarfism, non union R femur,Uses a wheelchair          Allergies   Allergen Reactions   • Latex Anaphylaxis and Angioedema   • Codeine Hives       Past Surgical History:   Procedure Laterality Date   • COLONOSCOPY  11/15/2010    Colitis found in the entire colon. Biopsy taken. Normal terminal ileum. Biopsy taken   • ELBOW PROCEDURE Right    • FEMUR SURGERY Bilateral    • HEMORROIDECTOMY  09/17/2013    With excision of a single internal and external hemorrhoid.   • HERNIA REPAIR  09/21/2011    Large ventral hernia. Open ventral hernioplasty with mesh   • LUMBAR DISC  SURGERY     • SPINE SURGERY Bilateral    • UPPER GASTROINTESTINAL ENDOSCOPY  11/15/2010    Normal esophagus.Gastritis found in the stomach. Biopsy taken. Normal duodenum. Biopsy taken       Family History   Problem Relation Age of Onset   • Lymphoma Brother        Social History     Socioeconomic History   • Marital status: Single   Tobacco Use   • Smoking status: Current Every Day Smoker     Packs/day: 0.25   • Smokeless tobacco: Never Used   Substance and Sexual Activity   • Alcohol use: No     Comment: sober for 2.5 years    • Drug use: Yes     Frequency: 2.0 times per week     Types: Marijuana   • Sexual activity: Defer           Objective      BP (!) 189/109   Pulse 88   Temp 98.7 °F (37.1 °C)   Resp 18   Wt 47.2 kg (104 lb)   SpO2 100%   BMI 33.10 kg/m²     Physical Exam  Vitals and nursing note reviewed.   Constitutional:       Appearance: Normal appearance.   HENT:      Head: Normocephalic and atraumatic.   Eyes:      Conjunctiva/sclera: Conjunctivae normal.   Cardiovascular:      Rate and Rhythm: Normal rate.   Pulmonary:      Effort: Pulmonary effort is normal. No respiratory distress.   Neurological:      Mental Status: She is alert. Mental status is at baseline.   Psychiatric:         Mood and Affect: Mood normal.         Behavior: Behavior normal.         Thought Content: Thought content normal.         Procedures           ED Course                                                 MDM    Final diagnoses:   Depressive disorder   Encounter for medication refill       ED Disposition  ED Disposition     ED Disposition Condition Comment    Discharge Stable           Naila Su MD  [facility] [address] [place] [address] [phone]    Call in 1 day      [facility] EMERGENCY DEPARTMENT  [address] [facility] [address] [phone]  Go to   if symptoms worsen.         Where to Get Your Medications      These medications were sent to [facility] -  [place], [place] - [address] [place] [address] - [phone]  - [phone] FX  [address] [place] [id number]    Phone: [phone]   · venlafaxine XR 37.5 MG 24 hr capsule        Medication List      No changes were made to your prescriptions during this visit.          Eladio Eason PA-C  12/07/21 1943

## 2022-01-11 ENCOUNTER — TELEPHONE (OUTPATIENT)
Dept: FAMILY MEDICINE CLINIC | Facility: CLINIC | Age: 47
End: 2022-01-11

## 2022-01-11 DIAGNOSIS — F33.1 MODERATE EPISODE OF RECURRENT MAJOR DEPRESSIVE DISORDER: ICD-10-CM

## 2022-01-11 RX ORDER — VENLAFAXINE HYDROCHLORIDE 37.5 MG/1
75 CAPSULE, EXTENDED RELEASE ORAL DAILY
Qty: 30 CAPSULE | Refills: 2 | Status: SHIPPED | OUTPATIENT
Start: 2022-01-11 | End: 2022-04-07

## 2022-01-11 NOTE — PROGRESS NOTES
"Increased doses of Effexor Exar.  Patient states she has been laying in bed and unable to get out of bed for the last 5 days and feels \"100 times more tired\" than normal.    Increased dose of Effexor XR to 75 mg daily up from 37.5 mg daily.  Instructed patient to let me know if she starts feeling suicidal/homicidal, or more depressed and less energetic over the next 7 to 10 days.      This document has been electronically signed by Naila Su MD on January 11, 2022 13:32 CST    Naila Su MD PGY-3  Part of this note may be an electronic transcription/translation of spoken language to printed text using the Dragon Dictation System.     "

## 2022-01-11 NOTE — TELEPHONE ENCOUNTER
PATIENT CALLED REQUESTING HER EFFEXOR XR DOSAGE BE INCREASED AND CALLED IN TO [facility]. IF SHE NEEDS TO BE SEEN , PLEASE CALL HER [phone]

## 2022-04-07 ENCOUNTER — OFFICE VISIT (OUTPATIENT)
Dept: FAMILY MEDICINE CLINIC | Facility: CLINIC | Age: 47
End: 2022-04-07

## 2022-04-07 ENCOUNTER — LAB (OUTPATIENT)
Dept: LAB | Facility: HOSPITAL | Age: 47
End: 2022-04-07

## 2022-04-07 VITALS
BODY MASS INDEX: 33.32 KG/M2 | TEMPERATURE: 98.2 F | SYSTOLIC BLOOD PRESSURE: 152 MMHG | OXYGEN SATURATION: 97 % | DIASTOLIC BLOOD PRESSURE: 100 MMHG | WEIGHT: 104.7 LBS | HEART RATE: 89 BPM

## 2022-04-07 DIAGNOSIS — Z51.81 MEDICATION MONITORING ENCOUNTER: ICD-10-CM

## 2022-04-07 DIAGNOSIS — Z13.0 SCREENING, ANEMIA, DEFICIENCY, IRON: ICD-10-CM

## 2022-04-07 DIAGNOSIS — Z13.220 SCREENING FOR HYPERLIPIDEMIA: ICD-10-CM

## 2022-04-07 DIAGNOSIS — K00.9 DENTAL ANOMALY: Primary | ICD-10-CM

## 2022-04-07 DIAGNOSIS — F33.1 MODERATE EPISODE OF RECURRENT MAJOR DEPRESSIVE DISORDER: ICD-10-CM

## 2022-04-07 PROCEDURE — 80053 COMPREHEN METABOLIC PANEL: CPT

## 2022-04-07 PROCEDURE — 99213 OFFICE O/P EST LOW 20 MIN: CPT | Performed by: STUDENT IN AN ORGANIZED HEALTH CARE EDUCATION/TRAINING PROGRAM

## 2022-04-07 PROCEDURE — 36415 COLL VENOUS BLD VENIPUNCTURE: CPT

## 2022-04-07 PROCEDURE — 80061 LIPID PANEL: CPT

## 2022-04-07 PROCEDURE — 85025 COMPLETE CBC W/AUTO DIFF WBC: CPT

## 2022-04-07 RX ORDER — VENLAFAXINE HYDROCHLORIDE 150 MG/1
150 CAPSULE, EXTENDED RELEASE ORAL DAILY
Qty: 90 CAPSULE | Refills: 1 | Status: SHIPPED | OUTPATIENT
Start: 2022-04-07 | End: 2022-09-08

## 2022-04-07 NOTE — PROGRESS NOTES
Family Medicine Residency  Naila Su MD    Subjective:     Maria Guadalupe Gibson is a 47 y.o. female who presents for depression and anxiety.    She is feeling much better anxiety and depression-wise, but she is having lack of ambition. She would like to increase her dose of effexor. PHQ-9 12 today.     She is having tooth loss from her chronic condition of osteogenesis imperfecta type 3. She might need to have her teeth extracted and get dentures.     The following portions of the patient's history were reviewed and updated as appropriate: allergies, current medications, past family history, past medical history, past social history, past surgical history and problem list.    Past Medical Hx:  Past Medical History:   Diagnosis Date   • Agoraphobia with panic attacks    • Benign paroxysmal positional vertigo    • Chronic pain    • Depression    • Depressive disorder    • External hemorrhoids without complication     excised   • Internal hemorrhoids     with prolapse      • Osteogenesis imperfecta type III     with dwarfism, non union R femur,Uses a wheelchair          Past Surgical Hx:  Past Surgical History:   Procedure Laterality Date   • COLONOSCOPY  11/15/2010    Colitis found in the entire colon. Biopsy taken. Normal terminal ileum. Biopsy taken   • ELBOW PROCEDURE Right    • FEMUR SURGERY Bilateral    • HEMORROIDECTOMY  09/17/2013    With excision of a single internal and external hemorrhoid.   • HERNIA REPAIR  09/21/2011    Large ventral hernia. Open ventral hernioplasty with mesh   • LUMBAR DISC SURGERY     • SPINE SURGERY Bilateral    • UPPER GASTROINTESTINAL ENDOSCOPY  11/15/2010    Normal esophagus.Gastritis found in the stomach. Biopsy taken. Normal duodenum. Biopsy taken       Current Meds:    Current Outpatient Medications:   •  diclofenac (VOLTAREN) 1 % gel gel, Apply 4 g topically to the appropriate area as directed 4 (Four) Times a Day As Needed (pain)., Disp: 100 g, Rfl: 2  •  Diclofenac  Sodium (VOLTAREN) 1 % gel gel, Apply 4 g topically to the appropriate area as directed 4 (Four) Times a Day., Disp: 4 g, Rfl: 3  •  Elastic Bandages & Supports (WRIST SPLINT/LEFT SMALL) misc, 1 each Daily., Disp: 1 each, Rfl: 0  •  Elastic Bandages & Supports (WRIST SPLINT/RIGHT SMALL) misc, 1 each Daily., Disp: 1 each, Rfl: 0  •  SUMAtriptan (IMITREX) 50 MG tablet, Take 1 tablet by mouth As Needed for Migraine. Take one tablet at onset of headache. May repeat dose one time in 2 hours if headache not relieved., Disp: 9 tablet, Rfl: 0  •  venlafaxine XR (EFFEXOR-XR) 150 MG 24 hr capsule, Take 1 capsule by mouth Daily for 90 days., Disp: 90 capsule, Rfl: 1    Allergies:  Allergies   Allergen Reactions   • Latex Anaphylaxis and Angioedema   • Codeine Hives       Family Hx:  Family History   Problem Relation Age of Onset   • Lymphoma Brother         Social History:  Social History     Socioeconomic History   • Marital status: Single   Tobacco Use   • Smoking status: Current Every Day Smoker     Packs/day: 0.25   • Smokeless tobacco: Never Used   Substance and Sexual Activity   • Alcohol use: No     Comment: sober for 2.5 years    • Drug use: Yes     Frequency: 2.0 times per week     Types: Marijuana   • Sexual activity: Defer       Review of Systems  Review of Systems   Constitutional: Negative for activity change and appetite change.   HENT: Positive for dental problem. Negative for congestion and ear pain.    Eyes: Negative for pain and discharge.   Respiratory: Negative for chest tightness and shortness of breath.    Cardiovascular: Negative for chest pain and palpitations.   Gastrointestinal: Negative for abdominal distention and abdominal pain.   Endocrine: Negative for cold intolerance and heat intolerance.   Genitourinary: Negative for difficulty urinating and dysuria.   Musculoskeletal: Negative for arthralgias and back pain.   Skin: Negative for color change and rash.   Allergic/Immunologic: Negative for  environmental allergies and food allergies.   Neurological: Negative for dizziness and headaches.   Hematological: Negative for adenopathy. Does not bruise/bleed easily.   Psychiatric/Behavioral: Positive for decreased concentration. Negative for agitation and confusion.       Objective:     /100   Pulse 89   Temp 98.2 °F (36.8 °C)   Wt 47.5 kg (104 lb 11.2 oz)   SpO2 97%   BMI 33.32 kg/m²   Physical Exam  Vitals and nursing note reviewed.   Constitutional:       Appearance: She is well-developed.   HENT:      Head: Normocephalic and atraumatic.      Mouth/Throat:      Comments: Multiple dental caries with missing teeth  Eyes:      Pupils: Pupils are equal, round, and reactive to light.   Neck:      Thyroid: No thyromegaly.      Vascular: No JVD.      Trachea: No tracheal deviation.   Cardiovascular:      Rate and Rhythm: Normal rate.      Pulses:           Radial pulses are 2+ on the right side and 2+ on the left side.        Dorsalis pedis pulses are 2+ on the right side and 2+ on the left side.      Heart sounds: Normal heart sounds, S1 normal and S2 normal.   Pulmonary:      Effort: Pulmonary effort is normal.      Breath sounds: Normal breath sounds.   Abdominal:      General: Bowel sounds are normal.   Musculoskeletal:         General: Normal range of motion.   Skin:     General: Skin is warm and dry.      Capillary Refill: Capillary refill takes 2 to 3 seconds.   Neurological:      Mental Status: She is alert and oriented to person, place, and time.      GCS: GCS eye subscore is 4. GCS verbal subscore is 5. GCS motor subscore is 6.   Psychiatric:         Speech: Speech normal.         Behavior: Behavior normal.         Thought Content: Thought content normal.          Assessment/Plan:     Diagnoses and all orders for this visit:    1. Dental anomaly (Primary)  -     Ambulatory Referral to Dentistry    2. Moderate episode of recurrent major depressive disorder (HCC)  -     venlafaxine XR  (EFFEXOR-XR) 150 MG 24 hr capsule; Take 1 capsule by mouth Daily for 90 days.  Dispense: 90 capsule; Refill: 1    3. Screening, anemia, deficiency, iron  -     CBC & Differential; Future    4. Medication monitoring encounter  -     Comprehensive metabolic panel; Future    5. Screening for hyperlipidemia  -     Lipid Panel; Future      Labs today.  Referral to dentist for missing teeth and need to have teeth pulled due to her chronic condition of osteogenesis imperfecta type III.  Increase Effexor to 150 mg daily.  Will check back in 3 months or let me know if she needs a different kind of medicine prior to that.  · Rx changes: Increase Effexor to 150 mg  · Patient Education: See above  · Compliance at present is estimated to be good.   · Efforts to improve compliance (if necessary) will be directed at Medication compliance and proper follow-ups.    Depression screening: Patient screened positive for depression based on a PHQ-9 score of 12 on 4/7/2022. Follow-up recommendations include: Follow-up each visit.     Follow-up:     Return in about 3 months (around 7/7/2022) for Recheck depression.    Preventative:  Health Maintenance   Topic Date Due   • COLORECTAL CANCER SCREENING  Never done   • ANNUAL PHYSICAL  Never done   • COVID-19 Vaccine (1) Never done   • Pneumococcal Vaccine 0-64 (1 - PCV) Never done   • HEPATITIS C SCREENING  Never done   • PAP SMEAR  02/06/2021   • INFLUENZA VACCINE  08/01/2022   • TDAP/TD VACCINES (2 - Td or Tdap) 06/06/2026     Female Preventative: Exercises regularly  Recommended: none  Vaccine Counseling: N/A    Weight  -Class: Obese Class I: 30-34.9kg/m2  -Patient's Body mass index is 33.32 kg/m². indicating that she is obese (BMI >30).  Alcohol use:  reports no history of alcohol use.  Nicotine status  reports that she has been smoking. She has been smoking about 0.25 packs per day. She has never used smokeless tobacco.     Goals     •  stay healthy (pt-stated)       Barriers to goals:  none               RISK SCORE: 4      This document has been electronically signed by Naila Su MD on April 7, 2022 17:06 CDT    Naila Su MD PGY-3  Part of this note may be an electronic transcription/translation of spoken language to printed text using the Dragon Dictation System.

## 2022-04-07 NOTE — PATIENT INSTRUCTIONS
Start taking 2 of your current effexor tablets until they are gone. I have sent in another prescription for the higher dose when you are out of pills.    You will get a call from a dentist. Call clinic if you do not get the call in the next week or so.

## 2022-04-08 ENCOUNTER — DOCUMENTATION (OUTPATIENT)
Dept: FAMILY MEDICINE CLINIC | Facility: CLINIC | Age: 47
End: 2022-04-08

## 2022-04-08 LAB
ALBUMIN SERPL-MCNC: 4.6 G/DL (ref 3.5–5.2)
ALBUMIN/GLOB SERPL: 1.5 G/DL
ALP SERPL-CCNC: 76 U/L (ref 39–117)
ALT SERPL W P-5'-P-CCNC: 7 U/L (ref 1–33)
ANION GAP SERPL CALCULATED.3IONS-SCNC: 11.5 MMOL/L (ref 5–15)
AST SERPL-CCNC: 12 U/L (ref 1–32)
BASOPHILS # BLD AUTO: 0.05 10*3/MM3 (ref 0–0.2)
BASOPHILS NFR BLD AUTO: 1.1 % (ref 0–1.5)
BILIRUB SERPL-MCNC: 0.4 MG/DL (ref 0–1.2)
BUN SERPL-MCNC: 8 MG/DL (ref 6–20)
BUN/CREAT SERPL: 14.3 (ref 7–25)
CALCIUM SPEC-SCNC: 9.4 MG/DL (ref 8.6–10.5)
CHLORIDE SERPL-SCNC: 102 MMOL/L (ref 98–107)
CHOLEST SERPL-MCNC: 197 MG/DL (ref 0–200)
CO2 SERPL-SCNC: 24.5 MMOL/L (ref 22–29)
CREAT SERPL-MCNC: 0.56 MG/DL (ref 0.57–1)
DEPRECATED RDW RBC AUTO: 44 FL (ref 37–54)
EGFRCR SERPLBLD CKD-EPI 2021: 114.1 ML/MIN/1.73
EOSINOPHIL # BLD AUTO: 0.12 10*3/MM3 (ref 0–0.4)
EOSINOPHIL NFR BLD AUTO: 2.6 % (ref 0.3–6.2)
ERYTHROCYTE [DISTWIDTH] IN BLOOD BY AUTOMATED COUNT: 12.7 % (ref 12.3–15.4)
GLOBULIN UR ELPH-MCNC: 3.1 GM/DL
GLUCOSE SERPL-MCNC: 93 MG/DL (ref 65–99)
HCT VFR BLD AUTO: 44.6 % (ref 34–46.6)
HDLC SERPL-MCNC: 61 MG/DL (ref 40–60)
HGB BLD-MCNC: 15 G/DL (ref 12–15.9)
IMM GRANULOCYTES # BLD AUTO: 0.01 10*3/MM3 (ref 0–0.05)
IMM GRANULOCYTES NFR BLD AUTO: 0.2 % (ref 0–0.5)
LDLC SERPL CALC-MCNC: 125 MG/DL (ref 0–100)
LDLC/HDLC SERPL: 2.04 {RATIO}
LYMPHOCYTES # BLD AUTO: 1.61 10*3/MM3 (ref 0.7–3.1)
LYMPHOCYTES NFR BLD AUTO: 34.5 % (ref 19.6–45.3)
MCH RBC QN AUTO: 31.7 PG (ref 26.6–33)
MCHC RBC AUTO-ENTMCNC: 33.6 G/DL (ref 31.5–35.7)
MCV RBC AUTO: 94.3 FL (ref 79–97)
MONOCYTES # BLD AUTO: 0.46 10*3/MM3 (ref 0.1–0.9)
MONOCYTES NFR BLD AUTO: 9.9 % (ref 5–12)
NEUTROPHILS NFR BLD AUTO: 2.41 10*3/MM3 (ref 1.7–7)
NEUTROPHILS NFR BLD AUTO: 51.7 % (ref 42.7–76)
NRBC BLD AUTO-RTO: 0 /100 WBC (ref 0–0.2)
PLATELET # BLD AUTO: 309 10*3/MM3 (ref 140–450)
PMV BLD AUTO: 10.4 FL (ref 6–12)
POTASSIUM SERPL-SCNC: 3.8 MMOL/L (ref 3.5–5.2)
PROT SERPL-MCNC: 7.7 G/DL (ref 6–8.5)
RBC # BLD AUTO: 4.73 10*6/MM3 (ref 3.77–5.28)
SODIUM SERPL-SCNC: 138 MMOL/L (ref 136–145)
TRIGL SERPL-MCNC: 59 MG/DL (ref 0–150)
VLDLC SERPL-MCNC: 11 MG/DL (ref 5–40)
WBC NRBC COR # BLD: 4.66 10*3/MM3 (ref 3.4–10.8)

## 2022-04-08 NOTE — PROGRESS NOTES
The 10-year ASCVD risk score (Hao MURPHY Jr., et al., 2013) is: 3.4%    Values used to calculate the score:      Age: 47 years      Sex: Female      Is Non- : No      Diabetic: No      Tobacco smoker: Yes      Systolic Blood Pressure: 152 mmHg      Is BP treated: No      HDL Cholesterol: 61 mg/dL      Total Cholesterol: 197 mg/dL    Reviewed patient's labs. No new medication changes needed at this time. Will send patient lab results via mail.      This document has been electronically signed by Naila Su MD on April 8, 2022 13:02 CDT    Naila Su MD PGY-3

## 2022-04-12 NOTE — PROGRESS NOTES
I have spoken with the patient .     I have reviewed the notes, assessments, and/or procedures performed by Dr. Naila Su,   I concur with   her  documentation and assessment and plan for Maria Guadalupe Gibson.          This document has been electronically signed by Shayne Oakes MD on April 12, 2022 13:52 CDT

## 2022-07-12 ENCOUNTER — OFFICE VISIT (OUTPATIENT)
Dept: FAMILY MEDICINE CLINIC | Facility: CLINIC | Age: 47
End: 2022-07-12

## 2022-07-12 VITALS
WEIGHT: 104.3 LBS | SYSTOLIC BLOOD PRESSURE: 138 MMHG | HEIGHT: 55 IN | DIASTOLIC BLOOD PRESSURE: 86 MMHG | HEART RATE: 91 BPM | BODY MASS INDEX: 24.14 KG/M2 | OXYGEN SATURATION: 99 %

## 2022-07-12 DIAGNOSIS — M25.531 BILATERAL WRIST PAIN: ICD-10-CM

## 2022-07-12 DIAGNOSIS — F33.1 MODERATE EPISODE OF RECURRENT MAJOR DEPRESSIVE DISORDER: Primary | ICD-10-CM

## 2022-07-12 DIAGNOSIS — M25.532 BILATERAL WRIST PAIN: ICD-10-CM

## 2022-07-12 PROCEDURE — 99213 OFFICE O/P EST LOW 20 MIN: CPT

## 2022-07-12 RX ORDER — VENLAFAXINE HYDROCHLORIDE 150 MG/1
150 CAPSULE, EXTENDED RELEASE ORAL DAILY
Qty: 90 CAPSULE | Refills: 1 | Status: SHIPPED | OUTPATIENT
Start: 2022-07-12 | End: 2023-03-08
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
## 2022-07-15 ENCOUNTER — TELEPHONE (OUTPATIENT)
Dept: FAMILY MEDICINE CLINIC | Facility: CLINIC | Age: 47
End: 2022-07-15

## 2022-07-15 NOTE — TELEPHONE ENCOUNTER
Spoke to the pt to confirm that she spoke to Esme (the coordinator). Pt made an appointment to go to [facility] on August 16.

## 2022-07-22 NOTE — PROGRESS NOTES
"I have seen this patient and discussed the case with the resident and agree with the assessment and plan. I was in the room for the entirety of the visit.  The pt seemed reassured that we would get her a \"first available\" appt with any counselor in the region and she spoke positively about her future as a grandmother..   AARON Amado M.D.    "

## 2022-09-02 ENCOUNTER — OFFICE VISIT (OUTPATIENT)
Dept: FAMILY MEDICINE CLINIC | Facility: CLINIC | Age: 47
End: 2022-09-02

## 2022-09-02 VITALS
BODY MASS INDEX: 23.77 KG/M2 | WEIGHT: 102.7 LBS | OXYGEN SATURATION: 100 % | TEMPERATURE: 98 F | HEART RATE: 98 BPM | SYSTOLIC BLOOD PRESSURE: 140 MMHG | HEIGHT: 55 IN | DIASTOLIC BLOOD PRESSURE: 100 MMHG

## 2022-09-02 DIAGNOSIS — Q78.0 OSTEOGENESIS IMPERFECTA TYPE III: ICD-10-CM

## 2022-09-02 DIAGNOSIS — M54.32 LEFT SIDED SCIATICA: ICD-10-CM

## 2022-09-02 DIAGNOSIS — M54.2 PAIN OF CERVICAL SPINE: Primary | ICD-10-CM

## 2022-09-02 DIAGNOSIS — Z12.11 ENCOUNTER FOR SCREENING FOR MALIGNANT NEOPLASM OF COLON: ICD-10-CM

## 2022-09-02 DIAGNOSIS — Z12.4 PAP SMEAR FOR CERVICAL CANCER SCREENING: ICD-10-CM

## 2022-09-02 DIAGNOSIS — F33.1 MODERATE EPISODE OF RECURRENT MAJOR DEPRESSIVE DISORDER: ICD-10-CM

## 2022-09-02 PROCEDURE — 99213 OFFICE O/P EST LOW 20 MIN: CPT | Performed by: STUDENT IN AN ORGANIZED HEALTH CARE EDUCATION/TRAINING PROGRAM

## 2022-09-02 NOTE — PROGRESS NOTES
Family Medicine Residency  Joaquin Mcfadden MD    Subjective:     Maria Guadalupe Gibson is a 47 y.o. female who presents for depression, neck pain and management of osteogenesis imperfecta.     The following portions of the patient's history were reviewed and updated as appropriate: allergies, current medications, past family history, past medical history, past social history, past surgical history and problem list.    Past Medical Hx:  Past Medical History:   Diagnosis Date   • Agoraphobia with panic attacks    • Benign paroxysmal positional vertigo    • Chronic pain    • Depression    • Depressive disorder    • External hemorrhoids without complication     excised   • Internal hemorrhoids     with prolapse      • Osteogenesis imperfecta type III     with dwarfism, non union R femur,Uses a wheelchair          Past Surgical Hx:  Past Surgical History:   Procedure Laterality Date   • COLONOSCOPY  11/15/2010    Colitis found in the entire colon. Biopsy taken. Normal terminal ileum. Biopsy taken   • ELBOW PROCEDURE Right    • FEMUR SURGERY Bilateral    • HEMORROIDECTOMY  09/17/2013    With excision of a single internal and external hemorrhoid.   • HERNIA REPAIR  09/21/2011    Large ventral hernia. Open ventral hernioplasty with mesh   • LUMBAR DISC SURGERY     • SPINE SURGERY Bilateral    • UPPER GASTROINTESTINAL ENDOSCOPY  11/15/2010    Normal esophagus.Gastritis found in the stomach. Biopsy taken. Normal duodenum. Biopsy taken       Current Meds:    Current Outpatient Medications:   •  diclofenac (VOLTAREN) 1 % gel gel, Apply 4 g topically to the appropriate area as directed 4 (Four) Times a Day As Needed (pain)., Disp: 100 g, Rfl: 2  •  Diclofenac Sodium (VOLTAREN) 1 % gel gel, Apply 4 g topically to the appropriate area as directed 4 (Four) Times a Day., Disp: 4 g, Rfl: 3  •  doxycycline (MONODOX) 100 MG capsule, Take 1 capsule by mouth 2 (Two) Times a Day., Disp: 20 capsule, Rfl: 0  •  Elastic Bandages & Supports  (Wrist Splint/Left Small) misc, 1 each Daily., Disp: 1 each, Rfl: 0  •  Elastic Bandages & Supports (Wrist Splint/Right Small) misc, 1 each Daily., Disp: 1 each, Rfl: 0  •  ondansetron (ZOFRAN) 4 MG tablet, Take 1 tablet by mouth 4 (Four) Times a Day As Needed for Nausea or Vomiting., Disp: 20 tablet, Rfl: 0  •  SUMAtriptan (IMITREX) 50 MG tablet, Take 1 tablet by mouth As Needed for Migraine. Take one tablet at onset of headache. May repeat dose one time in 2 hours if headache not relieved., Disp: 9 tablet, Rfl: 0  •  triamcinolone (KENALOG) 0.1 % cream, Apply 1 application topically to the appropriate area as directed 3 (Three) Times a Day., Disp: 15 g, Rfl: 0  •  venlafaxine XR (Effexor XR) 150 MG 24 hr capsule, Take 1 capsule by mouth Daily., Disp: 90 capsule, Rfl: 1  •  venlafaxine XR (EFFEXOR-XR) 150 MG 24 hr capsule, Take 1 capsule by mouth Daily for 90 days., Disp: 90 capsule, Rfl: 1    Allergies:  Allergies   Allergen Reactions   • Latex Anaphylaxis and Angioedema   • Codeine Hives       Family Hx:  Family History   Problem Relation Age of Onset   • Lymphoma Brother         Social History:  Social History     Socioeconomic History   • Marital status: Single   Tobacco Use   • Smoking status: Current Every Day Smoker     Packs/day: 0.25     Types: Cigarettes   • Smokeless tobacco: Never Used   Vaping Use   • Vaping Use: Never used   Substance and Sexual Activity   • Alcohol use: No     Comment: sober for 2.5 years    • Drug use: Yes     Frequency: 2.0 times per week     Types: Marijuana   • Sexual activity: Defer       Review of Systems  Review of Systems   Constitutional: Negative for activity change, appetite change, chills, diaphoresis, fatigue and fever.        Overall presentation of osteogenesis imperfecta  Patient ambulates a few steps and moves around with a wheelchair   HENT: Negative for congestion, dental problem, ear discharge, ear pain, hearing loss, mouth sores, nosebleeds, postnasal drip, sinus  "pain, sore throat, tinnitus, trouble swallowing and voice change.    Eyes: Negative for photophobia, pain, discharge and itching.   Respiratory: Negative for cough, choking, chest tightness, shortness of breath and wheezing.    Cardiovascular: Negative for chest pain, palpitations and leg swelling.   Gastrointestinal: Negative for abdominal distention, abdominal pain, blood in stool, constipation, diarrhea, nausea and vomiting.   Endocrine: Negative for cold intolerance and heat intolerance.   Genitourinary: Negative for difficulty urinating, dysuria, flank pain and hematuria.   Musculoskeletal: Positive for back pain and neck pain. Negative for joint swelling.   Skin: Negative for color change and rash.   Neurological: Negative for dizziness, tremors, seizures, weakness, light-headedness, numbness and headaches.   Hematological: Negative for adenopathy.   Psychiatric/Behavioral: Negative for behavioral problems, confusion, hallucinations, self-injury and sleep disturbance.       Objective:     /100   Pulse 98   Temp 98 °F (36.7 °C)   Ht 119.4 cm (47\")   Wt 46.6 kg (102 lb 11.2 oz)   SpO2 100%   BMI 32.69 kg/m²   Physical Exam  Vitals and nursing note reviewed.   Constitutional:       Appearance: Normal appearance. She is not ill-appearing or diaphoretic.   HENT:      Head: Normocephalic and atraumatic.      Right Ear: External ear normal.      Left Ear: External ear normal.      Nose: Nose normal. No rhinorrhea.      Mouth/Throat:      Mouth: Mucous membranes are moist.      Pharynx: No posterior oropharyngeal erythema.   Eyes:      General: No scleral icterus.        Right eye: No discharge.         Left eye: No discharge.      Extraocular Movements: Extraocular movements intact.   Neck:      Vascular: No carotid bruit.   Cardiovascular:      Rate and Rhythm: Normal rate and regular rhythm.      Pulses: Normal pulses.      Heart sounds: Normal heart sounds.     No gallop.   Pulmonary:      Effort: " Pulmonary effort is normal.      Breath sounds: Normal breath sounds. No wheezing.   Chest:      Chest wall: No tenderness.   Abdominal:      General: Bowel sounds are normal.      Palpations: Abdomen is soft.      Tenderness: There is no rebound.   Musculoskeletal:         General: Tenderness present. No swelling.      Cervical back: Deformity present. No muscular tenderness. Decreased range of motion.      Thoracic back: Deformity present.      Lumbar back: Deformity present.        Back:       Right lower leg: No edema.      Left lower leg: No edema.        Legs:       Comments: Restriction in extension of the cervical spine  Pain in right thoracic rotation  Upper nerve impingement cervical spine   Lymphadenopathy:      Cervical: No cervical adenopathy.   Skin:     General: Skin is warm and dry.      Capillary Refill: Capillary refill takes less than 2 seconds.      Findings: No erythema or rash.   Neurological:      General: No focal deficit present.      Mental Status: She is alert and oriented to person, place, and time.      Motor: No weakness.   Psychiatric:         Mood and Affect: Mood normal.         Behavior: Behavior normal.         Thought Content: Thought content normal.         Judgment: Judgment normal.          Assessment   Maria Guadalupe Gibson is a 47 y.o. female who presents for:    Depression:  Patient referred on to psychiatry [facility] for medication and counseling.  Psychiatry [facility] to address patient's osteogenesis imperfecta psychosocial factors affecting this lady with this condition    Care gap closure:  Referred to gynecology for Pap  Referred for colorectal screen     Cervical spine pain:  Referred on to physical therapy for TENS and appropriate physical therapy exercise regiment  Patient may or may not benefit from hydrotherapy  Patient will benefit from Quinn exercises of cervical spine  No manual therapy in this patient please note the following    Osteogenisis Imperfecta:  Educated  [facility] and seek the advice of Dr. Pan Galvan.  Gave the patient the direct details of Dr. Galvan.  This physician previously worked with Dr. Galvan I will try and contact his office over the next week.  Educated patient in relation to orthopedic concerns and osteogenesis imperfecta especially with the patient's aging status.  Next visit patient require MRI scan of spine  Discussed case with clinical pharmacist in relation to any medication treatment to aid patients at osteogenesis imperfecta especially in the aging population of osteogenesis imperfecta.    The patient was educated on the risks, benefits and alternatives to therapy. The patient was in agreement with therapy and plan of care.    Plan     Next visit review patient's osteogenesis imperfecta status whether patient or patient's daughter touched base with specialists  Next visit check to see if patient has attended with psychiatry [facility] via televisit  Next visit check to see if patient has close care gaps  Next visit discussed with patient relation to obtaining MRI scan  Next visit check patients medication regime.  Next visit check patients compliance to medication regime.    Diagnoses and all orders for this visit:    1. Pain of cervical spine (Primary)  -     Ambulatory Referral to Physical Therapy    2. Moderate episode of recurrent major depressive disorder (HCC)  -     Ambulatory Referral to Psychiatry    3. Left sided sciatica  -     Ambulatory Referral to Physical Therapy    4. Osteogenesis imperfecta type III  -     Ambulatory Referral to Physical Therapy    5. Encounter for screening for malignant neoplasm of colon  -     Ambulatory Referral For Screening Colonoscopy    6. Pap smear for cervical cancer screening  -     Ambulatory Referral to Gynecology      · Rx changes: Referred on to physical therapy  · Patient Education: Advised education relation to osteogenesis  imperfecta  · Compliance at present is estimated to be good.   · Efforts to improve compliance (if necessary) will be directed at Safety and transfers.    Depression screening: Up to date; last screen 7/12/2022       Follow-up:     Return in about 2 weeks (around 9/16/2022), or Osteogenesis imperfecta, for Recheck.    Preventative:  Health Maintenance   Topic Date Due   • COLORECTAL CANCER SCREENING  Never done   • COVID-19 Vaccine (1) Never done   • ANNUAL PHYSICAL  Never done   • Pneumococcal Vaccine 0-64 (1 - PCV) Never done   • HEPATITIS C SCREENING  Never done   • PAP SMEAR  02/06/2021   • INFLUENZA VACCINE  10/01/2022   • TDAP/TD VACCINES (2 - Td or Tdap) 06/06/2026     Female Preventative: Does monthly breast self examination  Recommended: none  Vaccine Counseling: N/A    Weight  -Class: Obese Class I: 30-34.9kg/m2  -BMI is >= 30 and <35. (Class 1 Obesity). The following options were offered after discussion;: weight loss educational material (shared in after visit summary) and exercise counseling/recommendations   eat more fruits and vegetables, decrease soda or juice intake, increase water intake and increase physical activity    Alcohol use:  reports no history of alcohol use.  Nicotine status  reports that she has been smoking cigarettes. She has been smoking about 0.25 packs per day. She has never used smokeless tobacco.     Goals     •  stay healthy (pt-stated)       Barriers to goals: none             RISK SCORE: 4    [facility] request # [id number]. Reviewed and appropriate.     Signature  [place] [address]  Office: [phone]      This document has been electronically signed by Joaquin Mcfadden MD on September 2, 2022 09:44 CDT      Part of this note may be an electronic transcription/translation of spoken language to printed text using the Dragon Dictation System.

## 2022-09-08 ENCOUNTER — DOCUMENTATION (OUTPATIENT)
Dept: FAMILY MEDICINE CLINIC | Facility: CLINIC | Age: 47
End: 2022-09-08

## 2022-09-08 NOTE — PROGRESS NOTES
Time of phone conversation: 10:44 CDT 9/8/2022       Maria Guadalupe Gibson is a 47 y.o. y.o. female  who I contacted the [facility] osteogenesis imperfecta [facility] at [place].  I spoke with the staff of Dr Mckeon, the phone number: [phone].  I was advised by the doctor's office that perhaps he would talk with the patient free of charge in relation to her osteogenesis imperfecta.  The patient has been made aware of this fact by the staff of Dr. Mckeon.      [place] [address]  Office: [phone]

## 2022-09-30 ENCOUNTER — TELEMEDICINE (OUTPATIENT)
Dept: PSYCHIATRY | Facility: CLINIC | Age: 47
End: 2022-09-30

## 2022-09-30 DIAGNOSIS — F33.1 MODERATE EPISODE OF RECURRENT MAJOR DEPRESSIVE DISORDER: Primary | ICD-10-CM

## 2022-09-30 DIAGNOSIS — F41.1 GENERALIZED ANXIETY DISORDER: ICD-10-CM

## 2022-09-30 PROBLEM — F32.A DEPRESSION: Status: ACTIVE | Noted: 2022-09-30

## 2022-09-30 PROCEDURE — 90791 PSYCH DIAGNOSTIC EVALUATION: CPT | Performed by: SOCIAL WORKER

## 2022-10-06 NOTE — PSYCHOTHERAPY NOTE
Depression starting in teens, parents didn't know how to deal with having kid with disability, dad always working multiple jobs, mom drug use having to be one dealing with disability and as older resentment towards me, even after having first daughter, I was never good enough, mom got what she wanted that she never got from me    Love mom and she helped me out, but felt like she only dealt with me because she had to, even as adult, dad again out of house and wondering why we never got along    Would treat me differently depending on who was around, he didn't find out what kind of person she was until after she , seeing him then have to deal with those things    Only one there when she , went to hospital and told dad to come home and take his medicine, know that was the way it was supposed to be    Being born disabled don't miss the things you've never been able to do.     When baby was born she saved my life, never want to make my child feel like a dissappointment to me or me to them    Couldn't sleep at night, everything from day and weeks before came flooding back    Disability hasn't bothered in terms of being different from other people    Every movement could break me    No energy improvement after 3 months of effexor max dose and quit that

## 2022-10-06 NOTE — PATIENT INSTRUCTIONS
Container exercise:     Some of what we talked about today were difficult life experiences you have been through. Sometimes as a result of talking about such, other experiences may begin to surface in thoughts, dreams, flashbacks, etc. Until we are able to meet again, please right down any additional experiences on a scrap of paper and put into a small container/box to address at our next session. Thank you!

## 2022-10-28 ENCOUNTER — TELEPHONE (OUTPATIENT)
Dept: FAMILY MEDICINE CLINIC | Facility: CLINIC | Age: 47
End: 2022-10-28

## 2022-10-28 NOTE — TELEPHONE ENCOUNTER
Patient came in and is needing a written statement verifying all the metal in her body. She is going to visit her brother in [place] and she has to have a written not.    Her#[phone]

## 2022-10-31 ENCOUNTER — TELEPHONE (OUTPATIENT)
Dept: FAMILY MEDICINE CLINIC | Facility: CLINIC | Age: 47
End: 2022-10-31

## 2022-11-14 ENCOUNTER — TELEMEDICINE (OUTPATIENT)
Dept: PSYCHIATRY | Facility: CLINIC | Age: 47
End: 2022-11-14

## 2022-11-14 DIAGNOSIS — F33.1 MODERATE EPISODE OF RECURRENT MAJOR DEPRESSIVE DISORDER: Primary | ICD-10-CM

## 2022-11-14 DIAGNOSIS — F41.1 GENERALIZED ANXIETY DISORDER: ICD-10-CM

## 2022-11-14 PROCEDURE — 90834 PSYTX W PT 45 MINUTES: CPT | Performed by: SOCIAL WORKER

## 2022-11-14 NOTE — PROGRESS NOTES
Date: November 14, 2022  Time In: 13:40  Time Out: 2:30    This provider is located at home address in connection with the [facility] (through [facility]), [address], [place] [address] using a secure [employer] Video Visit through [employer]. Patient is being seen remotely via telehealth at home address in [place] and stated they are in a secure environment for this session. The patient's condition being diagnosed/treated is appropriate for telemedicine. The provider identified himself as well as his credentials. The patient, and/or patients guardian, consent to be seen remotely, and when consent is given they understand that the consent allows for patient identifiable information to be sent to a third party as needed. They may refuse to be seen remotely at any time. The electronic data is encrypted and password protected, and the patient and/or guardian has been advised of the potential risks to privacy not withstanding such measures.  You have chosen to receive care through a telehealth visit.  Do you consent to use a video/audio connection for your medical care today? Yes    PROGRESS NOTE  Data:  Maria Guadalupe Gibson is a 47 y.o. female who presents today for follow up    Chief Complaint: Depression    History of Present Illness: Patient reports ongoing bouts of depression and feeling down for several days and not wanting to do anything, then increased stress of responsibilities stacking up. Reports some adjustment to  being back in home but especially related to his current health issues. Reports complex grief related to her mother who passed away two years ago and now going through her belongings and at times being angry with her again for how she was treated growing up. Reports worry about her children including her oldest daughter experiencing postpartum. Is hopeful to be able to get her own vehicle in order to better meet family needs for transportation. Also  hopeful to reschedule with psychiatry having missed initial appointment last week.     Clinical Maneuvering/Intervention:  Assisted patient in processing above session content; acknowledged and normalized patient’s thoughts, feelings, and concerns.  Rationalized patient thought process regarding complex loss and underlying emotions.  Discussed triggers associated with patient's depression and anxiety.  Also discussed coping skills for patient to implement such as journaling and gratitude list.    Allowed patient to freely discuss issues without interruption or judgment. Provided safe, confidential environment to facilitate the development of positive therapeutic relationship and encourage open, honest communication. Assisted patient in identifying risk factors which would indicate the need for higher level of care including thoughts to harm self or others and/or self-harming behavior and encouraged patient to contact this office, call 911, or present to the nearest emergency room should any of these events occur. Discussed crisis intervention services and means to access. Patient adamantly and convincingly denies current suicidal or homicidal ideation or perceptual disturbance.    Assessment:   Assessment   Patient appears to maintain relative stability as compared to their baseline.  However, patient continues to struggle with depression and anxiety which continues to cause impairment in important areas of functioning.  As a result, they can be reasonably expected to continue to benefit from treatment and would likely be at increased risk for decompensation otherwise.    Mental Status Exam:   Hygiene:   fair  Cooperation:  Cooperative  Eye Contact:  Good  Psychomotor Behavior:  Appropriate  Affect:  Full range  Mood: depressed and anxious  Speech:  Normal  Thought Process:  Goal directed  Thought Content:  Mood congruent  Suicidal:  None  Homicidal:  None  Hallucinations:  None  Delusion:  None  Memory:   Intact  Orientation:  Grossly intact  Reliability:  fair  Insight:  Fair  Judgement:  Good  Impulse Control:  Good  Physical/Medical Issues:  Yes see chart       Patient's Support Network Includes:  , children and father    Functional Status: Moderate impairment     Progress toward goal: Not at goal    Prognosis: Fair with Ongoing Treatment          Plan:    Patient will continue in individual outpatient therapy with focus on improved functioning and coping skills, maintaining stability, and avoiding decompensation and the need for higher level of care.    Patient will adhere to medication regimen as prescribed and report any side effects. Patient will contact this office, call 911 or present to the nearest emergency room should suicidal or homicidal ideations occur. Provide Cognitive Behavioral Therapy and Solution Focused Therapy to improve functioning, maintain stability, and avoid decompensation and the need for higher level of care.     Return in about 1 week, or earlier if symptoms worsen or fail to improve.           VISIT DIAGNOSIS:     ICD-10-CM ICD-9-CM   1. Moderate episode of recurrent major depressive disorder (HCC)  F33.1 296.32   2. Generalized anxiety disorder  F41.1 300.02        [facility] No Show Policy:  We understand unexpected circumstances arise; however, anytime you miss your appointment we are unable to provide you appropriate care.  In addition, each appointment missed could have been used to provide care for others.  We ask that you call at least 24 hours in advance to cancel or reschedule an appointment.  We would like to take this opportunity to remind you of our policy stating patients who miss THREE or more appointments without cancelling or rescheduling 24 hours in advance of the appointment may be subject to cancellation of any further visits with our clinic and recommendation to seek in-person services/visits.    Please call [phone] to reschedule  your appointment. If there are reasons that make it difficult for you to keep the appointments, please call and let us know how we can help.  Please understand that medication prescribing will not continue without seeing your provider.      [facility]'s No Show Policy reviewed with patient at today's visit. Patient verbalized understanding of this policy. Discussed with patient that in the event that there are three or more no show visits, it will be recommended that they pursue in-person services/visits as noncompliance with telehealth visits indicates that patient is not an appropriate candidate for telemedicine and would likely be more appropriate for in-person services/visits. Patient verbalizes understanding and is agreeable to this.         This document has been electronically signed by Adama Sánchez LCSW  November 14, 2022 14:50 EST     Part of this note may be an electronic transcription/translation of spoken language to printed text using the Dragon Dictation System.

## 2022-11-14 NOTE — PATIENT INSTRUCTIONS
"Journaling:    At times it may be helpful between sessions to record particularly difficult situations that arise in a journal entry. Journaling can look different for different people, so some examples of types of journal entries are listed below, USE whichever best fits YOUR needs/style:    A) Brief entry-a line or two describing incident and identifying feeling associated with such    B) Accounting of lows and highs-jot down 2-3 things that did not go well throughout your day AND 2-3 things that did go well or that you are thankful for    C) Long hand (for the writers out there)-allowing self a longer period of time (maybe even setting a timer) and \"spilling\" out anything that you need to from your day    All of these styles can be useful in healthy expression of emotion so pick whichever works best for YOU. Some may find style changes depends on needs of day/situation. This is by no means a one size fits all, just some ideas to get you going!    Gratitude List:    Write down up to five things for which you feel grateful. The physical record is important--don’t just do this exercise in your head. The things you list can be relatively small in importance (“The tasty sandwich I had for lunch today.”) or relatively large (“My sister gave birth to a healthy baby boy.”). The goal of the exercise is to remember a good event, experience, person, or thing in your life--then enjoy the good emotions that come with it.  "

## 2022-11-28 ENCOUNTER — TELEMEDICINE (OUTPATIENT)
Dept: PSYCHIATRY | Facility: CLINIC | Age: 47
End: 2022-11-28

## 2022-11-28 DIAGNOSIS — F41.1 GENERALIZED ANXIETY DISORDER: ICD-10-CM

## 2022-11-28 DIAGNOSIS — F33.1 MODERATE EPISODE OF RECURRENT MAJOR DEPRESSIVE DISORDER: Primary | ICD-10-CM

## 2022-11-28 PROCEDURE — 90837 PSYTX W PT 60 MINUTES: CPT | Performed by: SOCIAL WORKER

## 2022-11-28 NOTE — PROGRESS NOTES
"Date: November 28, 2022  Time In: 14:00  Time Out: 14:54    This provider is located at home address in connection with the [facility]), [address], [place] [address] using a secure [employer] Video Visit through [employer]. Patient is being seen remotely via telehealth at home address in [place] and stated they are in a secure environment for this session. The patient's condition being diagnosed/treated is appropriate for telemedicine. The provider identified himself as well as his credentials. The patient, and/or patients guardian, consent to be seen remotely, and when consent is given they understand that the consent allows for patient identifiable information to be sent to a third party as needed. They may refuse to be seen remotely at any time. The electronic data is encrypted and password protected, and the patient and/or guardian has been advised of the potential risks to privacy not withstanding such measures.  You have chosen to receive care through a telehealth visit.  Do you consent to use a video/audio connection for your medical care today? Yes    PROGRESS NOTE  Data:  Maria Guadalupe Gibson is a 47 y.o. female who presents today for follow up    Chief Complaint: depression    History of Present Illness: Patient reports difficulty over the last two weeks to point of not being able to \"even write down anything I'm grateful for\". Reports ongoing issues with  and communication related to such. Reports recently finding out  is sick but is not overly concerned with such given comparison to her own medical history. Reports having started drinking again and hoping to stop again due to effect on mood. Hasn't drank in 3 days. Reports being thankful for support of her father and recent purchase of a car for her.     Clinical Maneuvering/Intervention:  Assisted patient in processing above session content; acknowledged and normalized patient’s " thoughts, feelings, and concerns.  Rationalized patient thought process regarding relational stressors.  Discussed triggers associated with patient's depression and anxiety.  Also discussed coping skills for patient to implement such as gratitude list and self-timeout.    Allowed patient to freely discuss issues without interruption or judgment. Provided safe, confidential environment to facilitate the development of positive therapeutic relationship and encourage open, honest communication. Assisted patient in identifying risk factors which would indicate the need for higher level of care including thoughts to harm self or others and/or self-harming behavior and encouraged patient to contact this office, call 911, or present to the nearest emergency room should any of these events occur. Discussed crisis intervention services and means to access. Patient adamantly and convincingly denies current suicidal or homicidal ideation or perceptual disturbance.    Assessment:   Assessment   Patient appears to maintain relative stability as compared to their baseline.  However, patient continues to struggle with depression and anxiety which continues to cause impairment in important areas of functioning.  As a result, they can be reasonably expected to continue to benefit from treatment and would likely be at increased risk for decompensation otherwise.    Mental Status Exam:   Hygiene:   good  Cooperation:  Cooperative  Eye Contact:  Good  Psychomotor Behavior:  Appropriate  Affect:  Full range  Mood: depressed and anxious  Speech:  Normal  Thought Process:  Goal directed  Thought Content:  Mood congruent  Suicidal:  None  Homicidal:  None  Hallucinations:  None  Delusion:  None  Memory:  Intact  Orientation:  Grossly intact  Reliability:  fair  Insight:  Fair  Judgement:  Fair  Impulse Control:  Good  Physical/Medical Issues:  Yes see chart       Patient's Support Network Includes:  children and father    Functional  Status: Moderate impairment     Progress toward goal: Not at goal    Prognosis: Fair with Ongoing Treatment          Plan:    Patient will continue in individual outpatient therapy with focus on improved functioning and coping skills, maintaining stability, and avoiding decompensation and the need for higher level of care.    Patient will adhere to medication regimen as prescribed and report any side effects. Patient will contact this office, call 911 or present to the nearest emergency room should suicidal or homicidal ideations occur. Provide Cognitive Behavioral Therapy and Solution Focused Therapy to improve functioning, maintain stability, and avoid decompensation and the need for higher level of care.     Return in about 1 week, or earlier if symptoms worsen or fail to improve.           VISIT DIAGNOSIS:     ICD-10-CM ICD-9-CM   1. Moderate episode of recurrent major depressive disorder (HCC)  F33.1 296.32   2. Generalized anxiety disorder  F41.1 300.02        [facility] No Show Policy:  We understand unexpected circumstances arise; however, anytime you miss your appointment we are unable to provide you appropriate care.  In addition, each appointment missed could have been used to provide care for others.  We ask that you call at least 24 hours in advance to cancel or reschedule an appointment.  We would like to take this opportunity to remind you of our policy stating patients who miss THREE or more appointments without cancelling or rescheduling 24 hours in advance of the appointment may be subject to cancellation of any further visits with our clinic and recommendation to seek in-person services/visits.    Please call [phone] to reschedule your appointment. If there are reasons that make it difficult for you to keep the appointments, please call and let us know how we can help.  Please understand that medication prescribing will not continue without seeing your provider.       [facility]'s No Show Policy reviewed with patient at today's visit. Patient verbalized understanding of this policy. Discussed with patient that in the event that there are three or more no show visits, it will be recommended that they pursue in-person services/visits as noncompliance with telehealth visits indicates that patient is not an appropriate candidate for telemedicine and would likely be more appropriate for in-person services/visits. Patient verbalizes understanding and is agreeable to this.         This document has been electronically signed by Adama Sánchez LCSW  November 28, 2022 15:30 EST     Part of this note may be an electronic transcription/translation of spoken language to printed text using the Dragon Dictation System.

## 2022-11-28 NOTE — PATIENT INSTRUCTIONS
"SELF TIMEOUT    The possible need/use of this technique should be discussed with spouse/significant other prior to trying to use a \"self timeout\" during a more emotionally charged conversation.     At times we experience intense emotions when talking with those we love the most, often this can occur with spouses/significant others. When we live in community, relationship, and intimate relationships there are bound to be disagreements at times and when tempers flare neither party communicates well. For this reason, it can be helpful to, at times, take a \"self timeout\" from a particularly difficult conversation.    This is not a \"get out of [facility]CI free\" card for YOU or your spouse/significant other    When done properly either person can acknowledge that they are feeling flooded/overwhelmed and need time to take care of themselves to return to an elevated state.     We do not get to tell the other person that THEY are the one getting flooded, but it is likely that if they are, so are WE.     Once both parties have had an opportunity to breathe/cope/self-regulate/take a moment (or more like 30+ minutes) then we check to make sure the other person is feeling capable of returning to that conversation. They may not be ready quite yet and it is okay to press pause for a little longer.     I typically recommend allowing at least an hour before returning to the conversation and waiting no longer than 24 hours. There may be situations which require returning to the conversation at an agreed upon time. For instance, when children or others are not present.      Returning to conversation ONE to 24 hours later, if possible.       "

## 2023-03-08 ENCOUNTER — HOSPITAL ENCOUNTER (EMERGENCY)
Facility: HOSPITAL | Age: 48
Discharge: HOME | End: 2023-03-08
Attending: STUDENT IN AN ORGANIZED HEALTH CARE EDUCATION/TRAINING PROGRAM | Admitting: STUDENT IN AN ORGANIZED HEALTH CARE EDUCATION/TRAINING PROGRAM
Payer: MEDICAID

## 2023-03-08 ENCOUNTER — APPOINTMENT (OUTPATIENT)
Dept: ULTRASOUND IMAGING | Facility: HOSPITAL | Age: 48
End: 2023-03-08
Payer: MEDICAID

## 2023-03-08 VITALS
HEART RATE: 82 BPM | WEIGHT: 102 LBS | HEIGHT: 55 IN | OXYGEN SATURATION: 99 % | BODY MASS INDEX: 23.61 KG/M2 | SYSTOLIC BLOOD PRESSURE: 150 MMHG | TEMPERATURE: 97.9 F | RESPIRATION RATE: 18 BRPM | DIASTOLIC BLOOD PRESSURE: 62 MMHG

## 2023-03-08 DIAGNOSIS — L53.9 REDNESS OF SKIN: Primary | ICD-10-CM

## 2023-03-08 LAB
ANION GAP SERPL CALCULATED.3IONS-SCNC: 11 MMOL/L (ref 5–15)
BASOPHILS # BLD AUTO: 0.05 10*3/MM3 (ref 0–0.2)
BASOPHILS NFR BLD AUTO: 1 % (ref 0–1.5)
BUN SERPL-MCNC: 9 MG/DL (ref 6–20)
BUN/CREAT SERPL: 20.5 (ref 7–25)
CALCIUM SPEC-SCNC: 9 MG/DL (ref 8.6–10.5)
CHLORIDE SERPL-SCNC: 106 MMOL/L (ref 98–107)
CO2 SERPL-SCNC: 24 MMOL/L (ref 22–29)
CREAT SERPL-MCNC: 0.44 MG/DL (ref 0.57–1)
DEPRECATED RDW RBC AUTO: 43.8 FL (ref 37–54)
EGFRCR SERPLBLD CKD-EPI 2021: 120.2 ML/MIN/1.73
EOSINOPHIL # BLD AUTO: 0.17 10*3/MM3 (ref 0–0.4)
EOSINOPHIL NFR BLD AUTO: 3.3 % (ref 0.3–6.2)
ERYTHROCYTE [DISTWIDTH] IN BLOOD BY AUTOMATED COUNT: 13 % (ref 12.3–15.4)
GLUCOSE SERPL-MCNC: 106 MG/DL (ref 65–99)
HCT VFR BLD AUTO: 40.2 % (ref 34–46.6)
HGB BLD-MCNC: 13.7 G/DL (ref 12–15.9)
HOLD SPECIMEN: NORMAL
IMM GRANULOCYTES # BLD AUTO: 0.03 10*3/MM3 (ref 0–0.05)
IMM GRANULOCYTES NFR BLD AUTO: 0.6 % (ref 0–0.5)
LYMPHOCYTES # BLD AUTO: 1.21 10*3/MM3 (ref 0.7–3.1)
LYMPHOCYTES NFR BLD AUTO: 23.4 % (ref 19.6–45.3)
MCH RBC QN AUTO: 31.1 PG (ref 26.6–33)
MCHC RBC AUTO-ENTMCNC: 34.1 G/DL (ref 31.5–35.7)
MCV RBC AUTO: 91.4 FL (ref 79–97)
MONOCYTES # BLD AUTO: 0.34 10*3/MM3 (ref 0.1–0.9)
MONOCYTES NFR BLD AUTO: 6.6 % (ref 5–12)
NEUTROPHILS NFR BLD AUTO: 3.37 10*3/MM3 (ref 1.7–7)
NEUTROPHILS NFR BLD AUTO: 65.1 % (ref 42.7–76)
NRBC BLD AUTO-RTO: 0 /100 WBC (ref 0–0.2)
PLATELET # BLD AUTO: 286 10*3/MM3 (ref 140–450)
PMV BLD AUTO: 9.7 FL (ref 6–12)
POTASSIUM SERPL-SCNC: 3.3 MMOL/L (ref 3.5–5.2)
RBC # BLD AUTO: 4.4 10*6/MM3 (ref 3.77–5.28)
SODIUM SERPL-SCNC: 141 MMOL/L (ref 136–145)
WBC NRBC COR # BLD: 5.17 10*3/MM3 (ref 3.4–10.8)
WHOLE BLOOD HOLD COAG: NORMAL

## 2023-03-08 PROCEDURE — 93971 EXTREMITY STUDY: CPT

## 2023-03-08 PROCEDURE — 80048 BASIC METABOLIC PNL TOTAL CA: CPT | Performed by: STUDENT IN AN ORGANIZED HEALTH CARE EDUCATION/TRAINING PROGRAM

## 2023-03-08 PROCEDURE — 36415 COLL VENOUS BLD VENIPUNCTURE: CPT

## 2023-03-08 PROCEDURE — 99283 EMERGENCY DEPT VISIT LOW MDM: CPT

## 2023-03-08 PROCEDURE — 85025 COMPLETE CBC W/AUTO DIFF WBC: CPT | Performed by: STUDENT IN AN ORGANIZED HEALTH CARE EDUCATION/TRAINING PROGRAM

## 2023-03-08 RX ORDER — CEPHALEXIN 500 MG/1
500 CAPSULE ORAL 4 TIMES DAILY
Qty: 28 CAPSULE | Refills: 0 | Status: SHIPPED | OUTPATIENT
Start: 2023-03-08 | End: 2023-03-15

## 2023-03-08 RX ORDER — POTASSIUM CHLORIDE 750 MG/1
20 CAPSULE, EXTENDED RELEASE ORAL ONCE
Status: COMPLETED | OUTPATIENT
Start: 2023-03-08 | End: 2023-03-08

## 2023-03-08 RX ORDER — POTASSIUM CHLORIDE 750 MG/1
40 CAPSULE, EXTENDED RELEASE ORAL ONCE
Status: DISCONTINUED | OUTPATIENT
Start: 2023-03-08 | End: 2023-03-08

## 2023-03-08 RX ADMIN — POTASSIUM CHLORIDE 20 MEQ: 10 CAPSULE, COATED, EXTENDED RELEASE ORAL at 11:20

## 2023-03-08 NOTE — ED NOTES
Pt presents to the ED with c/o left leg pain and redness. Pt states pain started yesterday at around 4pm. Pt reports having a history of cellulitis and blood clots in her left leg.

## 2023-03-08 NOTE — ED PROVIDER NOTES
Subjective   History of Present Illness  48-year-old female comes to the ER chief complaint of left calf swelling and tenderness.  She also has noticed redness.  Symptoms started yesterday.  The redness was marked yesterday, but it has spread overnight to this morning.  No fevers, but patient does endorse having sweats.  She denies other symptoms.  She has had this before and reports having a clot in that leg before.  She is not on any blood thinners right now.    History provided by:  Patient   used: No        Review of Systems   Constitutional: Negative for chills and fever.   HENT: Negative for drooling.    Eyes: Negative for redness.   Respiratory: Negative for cough and shortness of breath.    Cardiovascular: Positive for leg swelling. Negative for chest pain and palpitations.   Gastrointestinal: Negative for abdominal pain, nausea and vomiting.   Genitourinary: Negative for flank pain.   Musculoskeletal: Positive for myalgias. Negative for arthralgias.   Skin: Positive for color change. Negative for wound.   Neurological: Negative for seizures.   Psychiatric/Behavioral: Negative for confusion.       Past Medical History:   Diagnosis Date   • Agoraphobia with panic attacks    • Benign paroxysmal positional vertigo    • Chronic pain    • Depression    • Depressive disorder    • External hemorrhoids without complication     excised   • Internal hemorrhoids     with prolapse      • Osteogenesis imperfecta type III     with dwarfism, non union R femur,Uses a wheelchair          Allergies   Allergen Reactions   • Latex Anaphylaxis and Angioedema   • Codeine Hives       Past Surgical History:   Procedure Laterality Date   • COLONOSCOPY  11/15/2010    Colitis found in the entire colon. Biopsy taken. Normal terminal ileum. Biopsy taken   • ELBOW PROCEDURE Right    • FEMUR SURGERY Bilateral    • HEMORROIDECTOMY  09/17/2013    With excision of a single internal and external hemorrhoid.   • HERNIA  "REPAIR  09/21/2011    Large ventral hernia. Open ventral hernioplasty with mesh   • LUMBAR DISC SURGERY     • SPINE SURGERY Bilateral    • UPPER GASTROINTESTINAL ENDOSCOPY  11/15/2010    Normal esophagus.Gastritis found in the stomach. Biopsy taken. Normal duodenum. 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   Constitutional:       General: She is not in acute distress.     Appearance: She is well-developed. She is obese. She is not ill-appearing or diaphoretic.   HENT:      Head: Normocephalic.   Eyes:      Conjunctiva/sclera: Conjunctivae normal.   Cardiovascular:      Pulses: Normal pulses.   Pulmonary:      Effort: Pulmonary effort is normal. No accessory muscle usage or respiratory distress.   Musculoskeletal:         General: Swelling and tenderness present. No deformity or signs of injury.      Right lower leg: No edema.      Left lower leg: Edema present.   Skin:     General: Skin is warm and dry.      Capillary Refill: Capillary refill takes less than 2 seconds.      Findings: Erythema present.          Neurological:      Mental Status: She is oriented to person, place, and time. "         Procedures           ED Course      Results for orders placed or performed during the hospital encounter of 03/08/23   Basic Metabolic Panel    Specimen: Blood   Result Value Ref Range    Glucose 106 (H) 65 - 99 mg/dL    BUN 9 6 - 20 mg/dL    Creatinine 0.44 (L) 0.57 - 1.00 mg/dL    Sodium 141 136 - 145 mmol/L    Potassium 3.3 (L) 3.5 - 5.2 mmol/L    Chloride 106 98 - 107 mmol/L    CO2 24.0 22.0 - 29.0 mmol/L    Calcium 9.0 8.6 - 10.5 mg/dL    BUN/Creatinine Ratio 20.5 7.0 - 25.0    Anion Gap 11.0 5.0 - 15.0 mmol/L    eGFR 120.2 >60.0 mL/min/1.73   CBC Auto Differential    Specimen: Blood   Result Value Ref Range    WBC 5.17 3.40 - 10.80 10*3/mm3    RBC 4.40 3.77 - 5.28 10*6/mm3    Hemoglobin 13.7 12.0 - 15.9 g/dL    Hematocrit 40.2 34.0 - 46.6 %    MCV 91.4 79.0 - 97.0 fL    MCH 31.1 26.6 - 33.0 pg    MCHC 34.1 31.5 - 35.7 g/dL    RDW 13.0 12.3 - 15.4 %    RDW-SD 43.8 37.0 - 54.0 fl    MPV 9.7 6.0 - 12.0 fL    Platelets 286 140 - 450 10*3/mm3    Neutrophil % 65.1 42.7 - 76.0 %    Lymphocyte % 23.4 19.6 - 45.3 %    Monocyte % 6.6 5.0 - 12.0 %    Eosinophil % 3.3 0.3 - 6.2 %    Basophil % 1.0 0.0 - 1.5 %    Immature Grans % 0.6 (H) 0.0 - 0.5 %    Neutrophils, Absolute 3.37 1.70 - 7.00 10*3/mm3    Lymphocytes, Absolute 1.21 0.70 - 3.10 10*3/mm3    Monocytes, Absolute 0.34 0.10 - 0.90 10*3/mm3    Eosinophils, Absolute 0.17 0.00 - 0.40 10*3/mm3    Basophils, Absolute 0.05 0.00 - 0.20 10*3/mm3    Immature Grans, Absolute 0.03 0.00 - 0.05 10*3/mm3    nRBC 0.0 0.0 - 0.2 /100 WBC     US Venous Doppler Lower Extremity Left (duplex)   Final Result   No evidence of deep venous thrombosis in the common   femoral, femoral, or popliteal veins of the left     lower   extremity. Baker's cyst left popliteal fossa as described above.          Electronically signed by:  Ceasar Frost MD  3/8/2023 10:32 AM CST   Workstation: [id number]                Medical Decision Making  Vital signs are stable, afebrile.  Labs show potassium a  little bit low at 3.3 and replaced in the ER.  White count is normal.  Ultrasound negative for DVT.  We will treat the patient for cellulitis given her redness that spreading with tenderness.  Antibiotics called into her pharmacy.  Return precautions given.  Recommend PCP follow-up.  Patient states understanding and is agreeable to the plan.    Redness of skin: acute illness or injury  Amount and/or Complexity of Data Reviewed  Labs: ordered.  ECG/medicine tests: ordered.      Risk  Prescription drug management.          Final diagnoses:   Redness of skin       ED Disposition  ED Disposition     ED Disposition   Discharge    Condition   Stable    Comment   --             Joaquin Mcfadden MD  [address]  [address] [place] [id number] [phone]    Schedule an appointment as soon as possible for a visit in 2 days  ER follow up         Medication List      New Prescriptions    cephalexin 500 MG capsule  Commonly known as: KEFLEX  Take 1 capsule by mouth 4 (Four) Times a Day for 7 days.           Where to Get Your Medications      These medications were sent to [facility] - [place], [place] - Count includes the [facility] - [phone] [place] [id number] [address] [address]    Phone: [phone]   · cephalexin 500 MG capsule          Levar Wheatley MD  03/08/23 6503

## 2023-05-02 ENCOUNTER — OFFICE VISIT (OUTPATIENT)
Dept: FAMILY MEDICINE CLINIC | Facility: CLINIC | Age: 48
End: 2023-05-02
Payer: MEDICAID

## 2023-05-02 VITALS
TEMPERATURE: 97.3 F | DIASTOLIC BLOOD PRESSURE: 78 MMHG | BODY MASS INDEX: 22.91 KG/M2 | OXYGEN SATURATION: 99 % | HEART RATE: 98 BPM | HEIGHT: 55 IN | WEIGHT: 99 LBS | SYSTOLIC BLOOD PRESSURE: 130 MMHG

## 2023-05-02 DIAGNOSIS — Q78.0 OSTEOGENESIS IMPERFECTA TYPE III: Primary | ICD-10-CM

## 2023-05-02 NOTE — PROGRESS NOTES
Family Medicine Residency  Joaquin Mcfadden MD    Subjective:     Maria Guadalupe Gibson is a 48 y.o. female who presents for osteogenesis imperfecta and the completion of paperwork to help with her with her ADLs at home.  The patient presents with the wrong paperwork from [employer].  The patient advised that she will follow-up with [employer] and bring the correct paperwork to this office to help with her ADLs.  The patient reports to me that she did not follow-up with the osteogenesis imperfecta specialist at [facility] for unknown reason.  Patient and patient's friend advised me that they will follow-up with osteogenesis imperfecta specialist at [facility] to help best with this pain patient's condition and plan of care for the future.  VSS.  Patient stable today and no complaints.    The following portions of the patient's history were reviewed and updated as appropriate: allergies, current medications, past family history, past medical history, past social history, past surgical history and problem list.    Past Medical Hx:  Past Medical History:   Diagnosis Date   • Agoraphobia with panic attacks    • Benign paroxysmal positional vertigo    • Chronic pain    • Depression    • Depressive disorder    • External hemorrhoids without complication     excised   • Internal hemorrhoids     with prolapse      • Osteogenesis imperfecta type III     with dwarfism, non union R femur,Uses a wheelchair          Past Surgical Hx:  Past Surgical History:   Procedure Laterality Date   • COLONOSCOPY  11/15/2010    Colitis found in the entire colon. Biopsy taken. Normal terminal ileum. Biopsy taken   • ELBOW PROCEDURE Right    • FEMUR SURGERY Bilateral    • HEMORROIDECTOMY  09/17/2013    With excision of a single internal and external hemorrhoid.   • HERNIA REPAIR  09/21/2011    Large ventral hernia. Open ventral hernioplasty with mesh   • LUMBAR DISC SURGERY     • SPINE SURGERY Bilateral    • UPPER GASTROINTESTINAL  ENDOSCOPY  11/15/2010    Normal esophagus.Gastritis found in the stomach. Biopsy taken. Normal duodenum. Biopsy taken       Current Meds:    Current Outpatient Medications:   •  diclofenac (VOLTAREN) 1 % gel gel, Apply 4 g topically to the appropriate area as directed 4 (Four) Times a Day As Needed (pain)., Disp: 100 g, Rfl: 2  •  Diclofenac Sodium (VOLTAREN) 1 % gel gel, Apply 4 g topically to the appropriate area as directed 4 (Four) Times a Day., Disp: 4 g, Rfl: 3  •  SUMAtriptan (IMITREX) 50 MG tablet, Take 1 tablet by mouth As Needed for Migraine. Take one tablet at onset of headache. May repeat dose one time in 2 hours if headache not relieved., Disp: 9 tablet, Rfl: 0    Allergies:  Allergies   Allergen Reactions   • Latex Anaphylaxis and Angioedema   • Codeine Hives       Family Hx:  Family History   Problem Relation Age of Onset   • Lymphoma Brother         Social History:  Social History     Socioeconomic History   • Marital status: Single   Tobacco Use   • Smoking status: Every Day     Packs/day: 0.25     Types: Cigarettes   • Smokeless tobacco: Never   Vaping Use   • Vaping Use: Never used   Substance and Sexual Activity   • Alcohol use: No     Comment: sober for 2.5 years    • Drug use: Yes     Frequency: 2.0 times per week     Types: Marijuana   • Sexual activity: Defer       Review of Systems  Review of Systems   Constitutional: Negative for activity change, appetite change, chills, diaphoresis, fatigue and fever.   HENT: Negative for congestion, dental problem, ear discharge, ear pain, hearing loss, mouth sores, nosebleeds, postnasal drip, sinus pain, sore throat, tinnitus, trouble swallowing and voice change.    Eyes: Negative for photophobia, pain, discharge and itching.   Respiratory: Negative for cough, choking, chest tightness, shortness of breath and wheezing.    Cardiovascular: Negative for chest pain, palpitations and leg swelling.   Gastrointestinal: Negative for abdominal distention,  "abdominal pain, blood in stool, constipation, diarrhea, nausea and vomiting.   Endocrine: Negative for cold intolerance and heat intolerance.   Genitourinary: Negative for difficulty urinating, dysuria, flank pain and hematuria.   Musculoskeletal: Negative for back pain, joint swelling and neck pain.   Skin: Negative for color change and rash.   Neurological: Negative for dizziness, tremors, seizures, weakness, light-headedness, numbness and headaches.   Hematological: Negative for adenopathy.   Psychiatric/Behavioral: Negative for behavioral problems, confusion, hallucinations, self-injury and sleep disturbance.       Objective:     /78   Pulse 98   Temp 97.3 °F (36.3 °C)   Ht 119.4 cm (47\")   Wt 44.9 kg (99 lb)   SpO2 99%   BMI 31.51 kg/m²   Physical Exam  Vitals and nursing note reviewed.   Constitutional:       Appearance: Normal appearance. She is not ill-appearing or diaphoretic.   HENT:      Head: Normocephalic and atraumatic.      Right Ear: External ear normal.      Left Ear: External ear normal.      Nose: Nose normal. No rhinorrhea.      Mouth/Throat:      Mouth: Mucous membranes are moist.      Pharynx: No posterior oropharyngeal erythema.   Eyes:      General: No scleral icterus.        Right eye: No discharge.         Left eye: No discharge.      Extraocular Movements: Extraocular movements intact.   Neck:      Vascular: No carotid bruit.   Cardiovascular:      Rate and Rhythm: Normal rate and regular rhythm.      Pulses: Normal pulses.      Heart sounds: Normal heart sounds.     No gallop.   Pulmonary:      Effort: Pulmonary effort is normal.      Breath sounds: Normal breath sounds. No wheezing.   Chest:      Chest wall: No tenderness.   Abdominal:      General: Bowel sounds are normal.      Palpations: Abdomen is soft.      Tenderness: There is no rebound.   Musculoskeletal:         General: No swelling.        Arms:       Cervical back: No muscular tenderness.      Right lower leg: No " edema.      Left lower leg: No edema.        Legs:    Lymphadenopathy:      Cervical: No cervical adenopathy.   Skin:     General: Skin is warm and dry.      Capillary Refill: Capillary refill takes less than 2 seconds.      Findings: No erythema or rash.   Neurological:      General: No focal deficit present.      Mental Status: She is alert and oriented to person, place, and time.      Motor: No weakness.   Psychiatric:         Mood and Affect: Mood normal.         Behavior: Behavior normal.         Thought Content: Thought content normal.         Judgment: Judgment normal.          Assessment   Maria Guadalupe Gibson is a 48 y.o. female who presents for:    Social work paperwork:  Patient presented with the wrong paperwork in order for the patient to have in-home help with ADLs.  Patient's mother has now passed away use to help the patient in ADLs.    Osteogenisis Imperfecta:  Educated patient to her condition.  I contacted [facility] at [facility] and sought the advice of Dr. Pan Galvan.  Gave the patient the direct details of Dr. Galvan.  This physician previously worked with Dr. Galvan. Educated patient in relation to orthopedic concerns and osteogenesis imperfecta especially with the patient's aging status.  Patient reports that they will attempt to contact osteogenesis imperfecta specialist although did not do this in the past at this physician's request.    The patient was educated on the risks, benefits and alternatives to therapy. The patient was in agreement with therapy and plan of care.    Plan     Next visit review patient's paperwork for social social service input  Next visit check patients medication regime.  Next visit check patients compliance to medication regime.    Diagnoses and all orders for this visit:    1. Osteogenesis imperfecta type III (Primary)      · Rx changes: As above  · Patient Education: Advised and educated the patient in relation to diet, lifestyle,  healthcare goals and educated in relation to osteogenesis imperfecta.  · Compliance at present is estimated to be good.   · Efforts to improve compliance (if necessary) will be directed at increased exercise.    Depression screening: Depression screening performed today; result negative; no follow up needed       PHQ-2 Depression Screening  Little interest or pleasure in doing things?  0   Feeling down, depressed, or hopeless?  0   PHQ-2 Total Score  0        PHQ-2 Total Score:   0      Patient has no suicidal, no homicidal ideation.    Patient has been offered psychiatric and psychological therapy input however has refused.    Follow-up:     Return in about 3 months (around 8/2/2023) for Annual.    Preventative:  Health Maintenance   Topic Date Due   • COLORECTAL CANCER SCREENING  Never done   • COVID-19 Vaccine (1) Never done   • Pneumococcal Vaccine 0-64 (1 - PCV) Never done   • HEPATITIS C SCREENING  Never done   • ANNUAL PHYSICAL  Never done   • PAP SMEAR  02/06/2021   • INFLUENZA VACCINE  08/01/2023   • TDAP/TD VACCINES (2 - Td or Tdap) 06/06/2026     Female Preventative: Does monthly breast self examination  Recommended: none  Vaccine Counseling: N/A    Weight  -Class: Obese Class I: 30-34.9kg/m2  -BMI is >= 30 and <35. (Class 1 Obesity). The following options were offered after discussion;: weight loss educational material (shared in after visit summary), exercise counseling/recommendations, nutrition counseling/recommendations and pharmacological intervention options   eat more fruits and vegetables, decrease soda or juice intake, increase water intake, increase physical activity, reduce screen time, reduce portion size, cut out extra servings, reduce fast food intake and family to eat at dinner table more often    Alcohol use:  reports no history of alcohol use.  Nicotine status  reports that she has been smoking cigarettes. She has been smoking an average of .25 packs per day. She has never used smokeless  tobacco.     Goals     •  stay healthy (pt-stated)       Barriers to goals: none             RISK SCORE: 3    [place] request #PDMP. Reviewed and appropriate.     Signature  [place] [address]  Office: [phone]      This document has been electronically signed by Joaquin Mcfadden MD on May 2, 2023 15:53 CDT      Portions of this note were copied from progress note written by Dr. Mcfadden; note reviewed and updated as appropriate.    Part of this note may be an electronic transcription/translation of spoken language to printed text using the Dragon Dictation System.

## 2023-05-02 NOTE — PROGRESS NOTES
I have spoken with the patient .  I have reviewed the notes, assessments, and/or procedures performed by Dr. Joaquin Mcfadden,   I concur with   his  documentation and assessment and plan for Maria Guadalupe Gibson.          This document has been electronically signed by Shayne Oakes MD on May 2, 2023 16:00 CDT